# Patient Record
Sex: MALE | Race: WHITE | NOT HISPANIC OR LATINO | URBAN - METROPOLITAN AREA
[De-identification: names, ages, dates, MRNs, and addresses within clinical notes are randomized per-mention and may not be internally consistent; named-entity substitution may affect disease eponyms.]

---

## 2017-01-03 ENCOUNTER — OUTPATIENT (OUTPATIENT)
Dept: OUTPATIENT SERVICES | Facility: HOSPITAL | Age: 42
LOS: 1 days | Discharge: HOME | End: 2017-01-03

## 2017-01-03 DIAGNOSIS — C46.0 KAPOSI'S SARCOMA OF SKIN: ICD-10-CM

## 2017-01-04 ENCOUNTER — RESULT REVIEW (OUTPATIENT)
Age: 42
End: 2017-01-04

## 2017-01-05 ENCOUNTER — LABORATORY RESULT (OUTPATIENT)
Age: 42
End: 2017-01-05

## 2017-01-05 ENCOUNTER — TRANSCRIPTION ENCOUNTER (OUTPATIENT)
Age: 42
End: 2017-01-05

## 2017-01-05 ENCOUNTER — APPOINTMENT (OUTPATIENT)
Dept: INFUSION THERAPY | Facility: HOSPITAL | Age: 42
End: 2017-01-05

## 2017-01-05 ENCOUNTER — APPOINTMENT (OUTPATIENT)
Dept: HEMATOLOGY ONCOLOGY | Facility: CLINIC | Age: 42
End: 2017-01-05

## 2017-01-05 VITALS
OXYGEN SATURATION: 98 % | SYSTOLIC BLOOD PRESSURE: 120 MMHG | WEIGHT: 169.75 LBS | BODY MASS INDEX: 25.81 KG/M2 | DIASTOLIC BLOOD PRESSURE: 81 MMHG | RESPIRATION RATE: 16 BRPM | HEART RATE: 76 BPM | TEMPERATURE: 97.4 F

## 2017-01-05 LAB
BASOPHILS # BLD AUTO: 0 K/UL — SIGNIFICANT CHANGE UP (ref 0–0.2)
BASOPHILS NFR BLD AUTO: 5 % — HIGH (ref 0–2)
EOSINOPHIL # BLD AUTO: 0.1 K/UL — SIGNIFICANT CHANGE UP (ref 0–0.5)
EOSINOPHIL NFR BLD AUTO: 3 % — SIGNIFICANT CHANGE UP (ref 0–6)
HCT VFR BLD CALC: 39.8 % — SIGNIFICANT CHANGE UP (ref 39–50)
HGB BLD-MCNC: 13.8 G/DL — SIGNIFICANT CHANGE UP (ref 13–17)
LYMPHOCYTES # BLD AUTO: 1.6 K/UL — SIGNIFICANT CHANGE UP (ref 1–3.3)
LYMPHOCYTES # BLD AUTO: 57 % — HIGH (ref 13–44)
MCHC RBC-ENTMCNC: 29.5 PG — SIGNIFICANT CHANGE UP (ref 27–34)
MCHC RBC-ENTMCNC: 34.7 GM/DL — SIGNIFICANT CHANGE UP (ref 32–36)
MCV RBC AUTO: 85.2 FL — SIGNIFICANT CHANGE UP (ref 80–100)
MONOCYTES # BLD AUTO: 0.7 K/UL — SIGNIFICANT CHANGE UP (ref 0–0.9)
MONOCYTES NFR BLD AUTO: 27 % — HIGH (ref 2–14)
NEUTROPHILS # BLD AUTO: 0.3 K/UL — LOW (ref 1.8–7.4)
NEUTROPHILS NFR BLD AUTO: 8 % — LOW (ref 43–77)
PLAT MORPH BLD: NORMAL — SIGNIFICANT CHANGE UP
PLATELET # BLD AUTO: 217 K/UL — SIGNIFICANT CHANGE UP (ref 150–400)
RBC # BLD: 4.67 M/UL — SIGNIFICANT CHANGE UP (ref 4.2–5.8)
RBC # FLD: 13.5 % — SIGNIFICANT CHANGE UP (ref 10.3–14.5)
RBC BLD AUTO: NORMAL — SIGNIFICANT CHANGE UP
WBC # BLD: 2.7 K/UL — LOW (ref 3.8–10.5)
WBC # FLD AUTO: 2.7 K/UL — LOW (ref 3.8–10.5)

## 2017-01-06 LAB
ALBUMIN SERPL ELPH-MCNC: 3.8 G/DL
ALP BLD-CCNC: 58 U/L
ALT SERPL-CCNC: 23 U/L
ANION GAP SERPL CALC-SCNC: 8 MMOL/L
APPEARANCE: CLEAR
AST SERPL-CCNC: 28 U/L
BILIRUB SERPL-MCNC: 0.5 MG/DL
BILIRUBIN URINE: NEGATIVE
BLOOD URINE: ABNORMAL
BUN SERPL-MCNC: 13 MG/DL
CALCIUM SERPL-MCNC: 9.2 MG/DL
CHLORIDE SERPL-SCNC: 100 MMOL/L
CO2 SERPL-SCNC: 28 MMOL/L
COLOR: YELLOW
CREAT SERPL-MCNC: 0.95 MG/DL
GLUCOSE QUALITATIVE U: NORMAL MG/DL
GLUCOSE SERPL-MCNC: 90 MG/DL
KETONES URINE: NEGATIVE
LEUKOCYTE ESTERASE URINE: NEGATIVE
NITRITE URINE: NEGATIVE
PH URINE: 6.5
POTASSIUM SERPL-SCNC: 4.9 MMOL/L
PROT SERPL-MCNC: 8.9 G/DL
PROTEIN URINE: NEGATIVE MG/DL
SODIUM SERPL-SCNC: 136 MMOL/L
SPECIFIC GRAVITY URINE: 1.01
UROBILINOGEN URINE: NORMAL MG/DL

## 2017-01-11 LAB — BACTERIA BLD CULT: NORMAL

## 2017-01-17 ENCOUNTER — LABORATORY RESULT (OUTPATIENT)
Age: 42
End: 2017-01-17

## 2017-01-18 ENCOUNTER — RESULT REVIEW (OUTPATIENT)
Age: 42
End: 2017-01-18

## 2017-01-18 ENCOUNTER — OUTPATIENT (OUTPATIENT)
Dept: OUTPATIENT SERVICES | Facility: HOSPITAL | Age: 42
LOS: 1 days | End: 2017-01-18
Payer: MEDICAID

## 2017-01-18 ENCOUNTER — APPOINTMENT (OUTPATIENT)
Dept: INFECTIOUS DISEASE | Facility: CLINIC | Age: 42
End: 2017-01-18

## 2017-01-18 VITALS
TEMPERATURE: 97.1 F | OXYGEN SATURATION: 99 % | WEIGHT: 172 LBS | SYSTOLIC BLOOD PRESSURE: 121 MMHG | HEIGHT: 68 IN | BODY MASS INDEX: 26.07 KG/M2 | DIASTOLIC BLOOD PRESSURE: 80 MMHG | HEART RATE: 78 BPM

## 2017-01-18 DIAGNOSIS — C46.9 KAPOSI'S SARCOMA, UNSPECIFIED: ICD-10-CM

## 2017-01-18 DIAGNOSIS — G47.00 INSOMNIA, UNSPECIFIED: ICD-10-CM

## 2017-01-18 DIAGNOSIS — D70.2 OTHER DRUG-INDUCED AGRANULOCYTOSIS: ICD-10-CM

## 2017-01-18 DIAGNOSIS — B19.10 UNSPECIFIED VIRAL HEPATITIS B WITHOUT HEPATIC COMA: ICD-10-CM

## 2017-01-18 DIAGNOSIS — B20 HUMAN IMMUNODEFICIENCY VIRUS [HIV] DISEASE: ICD-10-CM

## 2017-01-18 DIAGNOSIS — Z20.828 CONTACT WITH AND (SUSPECTED) EXPOSURE TO OTHER VIRAL COMMUNICABLE DISEASES: ICD-10-CM

## 2017-01-18 PROCEDURE — 86709 HEPATITIS A IGM ANTIBODY: CPT

## 2017-01-18 PROCEDURE — 83036 HEMOGLOBIN GLYCOSYLATED A1C: CPT

## 2017-01-18 PROCEDURE — 84156 ASSAY OF PROTEIN URINE: CPT

## 2017-01-18 PROCEDURE — G0463: CPT

## 2017-01-18 PROCEDURE — 87591 N.GONORRHOEAE DNA AMP PROB: CPT

## 2017-01-18 PROCEDURE — 81001 URINALYSIS AUTO W/SCOPE: CPT

## 2017-01-18 PROCEDURE — 86480 TB TEST CELL IMMUN MEASURE: CPT

## 2017-01-18 PROCEDURE — 86706 HEP B SURFACE ANTIBODY: CPT

## 2017-01-18 PROCEDURE — 86708 HEPATITIS A ANTIBODY: CPT

## 2017-01-18 PROCEDURE — 86592 SYPHILIS TEST NON-TREP QUAL: CPT

## 2017-01-18 PROCEDURE — 80053 COMPREHEN METABOLIC PANEL: CPT

## 2017-01-18 PROCEDURE — 36415 COLL VENOUS BLD VENIPUNCTURE: CPT

## 2017-01-18 PROCEDURE — 86780 TREPONEMA PALLIDUM: CPT

## 2017-01-18 PROCEDURE — 85027 COMPLETE CBC AUTOMATED: CPT

## 2017-01-18 PROCEDURE — 87491 CHLMYD TRACH DNA AMP PROBE: CPT

## 2017-01-18 PROCEDURE — 80061 LIPID PANEL: CPT

## 2017-01-18 PROCEDURE — 87536 HIV-1 QUANT&REVRSE TRNSCRPJ: CPT

## 2017-01-18 PROCEDURE — 86803 HEPATITIS C AB TEST: CPT

## 2017-01-18 RX ORDER — CIPROFLOXACIN HYDROCHLORIDE 500 MG/1
500 TABLET, FILM COATED ORAL
Qty: 14 | Refills: 0 | Status: COMPLETED | COMMUNITY
Start: 2017-01-05 | End: 2017-01-18

## 2017-01-18 RX ORDER — DIPHENHYDRAMINE HCL 25 MG/1
25 CAPSULE ORAL
Qty: 30 | Refills: 1 | Status: DISCONTINUED | COMMUNITY
Start: 2017-01-18 | End: 2017-01-18

## 2017-01-19 ENCOUNTER — LABORATORY RESULT (OUTPATIENT)
Age: 42
End: 2017-01-19

## 2017-01-19 ENCOUNTER — OTHER (OUTPATIENT)
Age: 42
End: 2017-01-19

## 2017-01-19 ENCOUNTER — RX RENEWAL (OUTPATIENT)
Age: 42
End: 2017-01-19

## 2017-01-19 ENCOUNTER — APPOINTMENT (OUTPATIENT)
Dept: HEMATOLOGY ONCOLOGY | Facility: CLINIC | Age: 42
End: 2017-01-19

## 2017-01-19 ENCOUNTER — APPOINTMENT (OUTPATIENT)
Dept: INFUSION THERAPY | Facility: HOSPITAL | Age: 42
End: 2017-01-19

## 2017-01-19 VITALS
WEIGHT: 171.98 LBS | SYSTOLIC BLOOD PRESSURE: 120 MMHG | DIASTOLIC BLOOD PRESSURE: 70 MMHG | BODY MASS INDEX: 26.15 KG/M2 | TEMPERATURE: 97.3 F | RESPIRATION RATE: 16 BRPM

## 2017-01-19 DIAGNOSIS — E87.1 HYPO-OSMOLALITY AND HYPONATREMIA: ICD-10-CM

## 2017-01-19 DIAGNOSIS — Z20.828 CONTACT WITH AND (SUSPECTED) EXPOSURE TO OTHER VIRAL COMMUNICABLE DISEASES: ICD-10-CM

## 2017-01-19 DIAGNOSIS — R26.81 UNSTEADINESS ON FEET: ICD-10-CM

## 2017-01-19 LAB
ALBUMIN SERPL ELPH-MCNC: 3.7 G/DL — SIGNIFICANT CHANGE UP (ref 3.3–5)
ALP SERPL-CCNC: 62 U/L — SIGNIFICANT CHANGE UP (ref 40–120)
ALT FLD-CCNC: 25 U/L — SIGNIFICANT CHANGE UP (ref 10–45)
ANION GAP SERPL CALC-SCNC: 13 MMOL/L — SIGNIFICANT CHANGE UP (ref 5–17)
APPEARANCE UR: CLEAR — SIGNIFICANT CHANGE UP
AST SERPL-CCNC: 33 U/L — SIGNIFICANT CHANGE UP (ref 10–40)
BACTERIA # UR AUTO: NEGATIVE — SIGNIFICANT CHANGE UP
BILIRUB SERPL-MCNC: 0.6 MG/DL — SIGNIFICANT CHANGE UP (ref 0.2–1.2)
BILIRUB UR-MCNC: NEGATIVE — SIGNIFICANT CHANGE UP
BUN SERPL-MCNC: 15 MG/DL — SIGNIFICANT CHANGE UP (ref 7–23)
BUN SERPL-MCNC: 16 MG/DL — SIGNIFICANT CHANGE UP (ref 7–23)
C TRACH RRNA SPEC QL NAA+PROBE: SIGNIFICANT CHANGE UP
C TRACH+GC RRNA SPEC QL PROBE: SIGNIFICANT CHANGE UP
CA-I BLDA-SCNC: 1.26 MMOL/L — SIGNIFICANT CHANGE UP (ref 1.12–1.3)
CALCIUM SERPL-MCNC: 9.6 MG/DL — SIGNIFICANT CHANGE UP (ref 8.4–10.5)
CHLAMYDIA/N. GONORRHEA, ORAL/THROAT, TMA - SOURCE ORAL: SIGNIFICANT CHANGE UP
CHLORIDE SERPL-SCNC: 100 MMOL/L — SIGNIFICANT CHANGE UP (ref 96–108)
CHLORIDE SERPL-SCNC: 100 MMOL/L — SIGNIFICANT CHANGE UP (ref 96–108)
CHOLEST SERPL-MCNC: 164 MG/DL — SIGNIFICANT CHANGE UP (ref 10–199)
CO2 SERPL-SCNC: 24 MMOL/L — SIGNIFICANT CHANGE UP (ref 22–31)
CO2 SERPL-SCNC: 26 MMOL/L — SIGNIFICANT CHANGE UP (ref 22–31)
COLOR SPEC: YELLOW — SIGNIFICANT CHANGE UP
CREAT ?TM UR-MCNC: 53 MG/DL — SIGNIFICANT CHANGE UP
CREAT SERPL-MCNC: 1 MG/DL — SIGNIFICANT CHANGE UP (ref 0.5–1.3)
CREAT SERPL-MCNC: 1.02 MG/DL — SIGNIFICANT CHANGE UP (ref 0.5–1.3)
DIFF PNL FLD: NEGATIVE — SIGNIFICANT CHANGE UP
EPI CELLS # UR: 0 /HPF — SIGNIFICANT CHANGE UP (ref 0–5)
GC AMPLIFICATION INTERPRETATION: SIGNIFICANT CHANGE UP
GLUCOSE SERPL-MCNC: 85 MG/DL — SIGNIFICANT CHANGE UP (ref 70–99)
GLUCOSE SERPL-MCNC: 87 MG/DL — SIGNIFICANT CHANGE UP (ref 70–99)
GLUCOSE UR QL: NEGATIVE MG/DL — SIGNIFICANT CHANGE UP
HAV IGG+IGM SER QL: SIGNIFICANT CHANGE UP
HAV IGM SER-ACNC: SIGNIFICANT CHANGE UP
HBV SURFACE AB SER-ACNC: SIGNIFICANT CHANGE UP
HCT VFR BLD CALC: 40.7 % — SIGNIFICANT CHANGE UP (ref 39–50)
HCT VFR BLD CALC: 47.8 % — SIGNIFICANT CHANGE UP (ref 39–50)
HCV AB S/CO SERPL IA: 0.22 S/CO — SIGNIFICANT CHANGE UP
HCV AB SERPL-IMP: SIGNIFICANT CHANGE UP
HDLC SERPL-MCNC: 43 MG/DL — SIGNIFICANT CHANGE UP (ref 40–125)
HGB BLD-MCNC: 13.8 G/DL — SIGNIFICANT CHANGE UP (ref 13–17)
HGB BLD-MCNC: 15.4 G/DL — SIGNIFICANT CHANGE UP (ref 13–17)
KETONES UR-MCNC: NEGATIVE — SIGNIFICANT CHANGE UP
LEUKOCYTE ESTERASE UR-ACNC: NEGATIVE — SIGNIFICANT CHANGE UP
LIPID PNL WITH DIRECT LDL SERPL: 91 MG/DL — SIGNIFICANT CHANGE UP
MCHC RBC-ENTMCNC: 28.4 PG — SIGNIFICANT CHANGE UP (ref 27–34)
MCHC RBC-ENTMCNC: 28.9 PG — SIGNIFICANT CHANGE UP (ref 27–34)
MCHC RBC-ENTMCNC: 32.2 GM/DL — SIGNIFICANT CHANGE UP (ref 32–36)
MCHC RBC-ENTMCNC: 34 GM/DL — SIGNIFICANT CHANGE UP (ref 32–36)
MCV RBC AUTO: 83.6 FL — SIGNIFICANT CHANGE UP (ref 80–100)
MCV RBC AUTO: 89.8 FL — SIGNIFICANT CHANGE UP (ref 80–100)
N GONORRHOEA RRNA SPEC QL NAA+PROBE: SIGNIFICANT CHANGE UP
NITRITE UR-MCNC: NEGATIVE — SIGNIFICANT CHANGE UP
PH UR: 6 — SIGNIFICANT CHANGE UP (ref 5–8)
PLATELET # BLD AUTO: 208 K/UL — SIGNIFICANT CHANGE UP (ref 150–400)
PLATELET # BLD AUTO: 213 K/UL — SIGNIFICANT CHANGE UP (ref 150–400)
POTASSIUM SERPL-MCNC: 4.4 MMOL/L — SIGNIFICANT CHANGE UP (ref 3.5–5.3)
POTASSIUM SERPL-MCNC: 4.6 MMOL/L — SIGNIFICANT CHANGE UP (ref 3.5–5.3)
POTASSIUM SERPL-SCNC: 4.4 MMOL/L — SIGNIFICANT CHANGE UP (ref 3.5–5.3)
POTASSIUM SERPL-SCNC: 4.6 MMOL/L — SIGNIFICANT CHANGE UP (ref 3.5–5.3)
PROT ?TM UR-MCNC: 11 MG/DL — SIGNIFICANT CHANGE UP (ref 0–12)
PROT SERPL-MCNC: 9.3 G/DL — HIGH (ref 6–8.3)
PROT UR-MCNC: NEGATIVE MG/DL — SIGNIFICANT CHANGE UP
PROT/CREAT UR-RTO: 0.2 RATIO — SIGNIFICANT CHANGE UP (ref 0–0.2)
RBC # BLD: 4.87 M/UL — SIGNIFICANT CHANGE UP (ref 4.2–5.8)
RBC # BLD: 5.32 M/UL — SIGNIFICANT CHANGE UP (ref 4.2–5.8)
RBC # FLD: 13 % — SIGNIFICANT CHANGE UP (ref 10.3–14.5)
RBC # FLD: 14.5 % — SIGNIFICANT CHANGE UP (ref 10.3–14.5)
RBC CASTS # UR COMP ASSIST: 1 /HPF — SIGNIFICANT CHANGE UP (ref 0–4)
RPR SERPL-ACNC: SIGNIFICANT CHANGE UP
SODIUM SERPL-SCNC: 137 MMOL/L — SIGNIFICANT CHANGE UP (ref 135–145)
SODIUM SERPL-SCNC: 139 MMOL/L — SIGNIFICANT CHANGE UP (ref 135–145)
SP GR SPEC: 1.01 — SIGNIFICANT CHANGE UP (ref 1.01–1.02)
SPECIMEN SOURCE: SIGNIFICANT CHANGE UP
SPECIMEN SOURCE: SIGNIFICANT CHANGE UP
T PALLIDUM AB TITR SER: POSITIVE
T PALLIDUM IGG SER QL IF: REACTIVE
TOTAL CHOLESTEROL/HDL RATIO MEASUREMENT: 3.8 RATIO — SIGNIFICANT CHANGE UP (ref 3.4–9.6)
TRIGL SERPL-MCNC: 148 MG/DL — SIGNIFICANT CHANGE UP (ref 10–149)
UROBILINOGEN FLD QL: NEGATIVE MG/DL — SIGNIFICANT CHANGE UP
WBC # BLD: 3.79 K/UL — LOW (ref 3.8–10.5)
WBC # BLD: 5.5 K/UL — SIGNIFICANT CHANGE UP (ref 3.8–10.5)
WBC # FLD AUTO: 3.79 K/UL — LOW (ref 3.8–10.5)
WBC # FLD AUTO: 5.5 K/UL — SIGNIFICANT CHANGE UP (ref 3.8–10.5)
WBC UR QL: 0 /HPF — SIGNIFICANT CHANGE UP (ref 0–5)

## 2017-01-20 ENCOUNTER — RESULT REVIEW (OUTPATIENT)
Age: 42
End: 2017-01-20

## 2017-01-20 DIAGNOSIS — Z51.11 ENCOUNTER FOR ANTINEOPLASTIC CHEMOTHERAPY: ICD-10-CM

## 2017-01-20 DIAGNOSIS — R11.2 NAUSEA WITH VOMITING, UNSPECIFIED: ICD-10-CM

## 2017-01-20 LAB
HBA1C BLD-MCNC: 5.1 % — SIGNIFICANT CHANGE UP (ref 4–5.6)
HIV1 RNA # SERPL NAA+PROBE: SIGNIFICANT CHANGE UP
HIV1 RNA SERPL NAA+PROBE-LOG#: <1.6 LG10COP/ML — SIGNIFICANT CHANGE UP
M TB TUBERC IFN-G BLD QL: 0 IU/ML — SIGNIFICANT CHANGE UP
M TB TUBERC IFN-G BLD QL: 0.04 IU/ML — SIGNIFICANT CHANGE UP
M TB TUBERC IFN-G BLD QL: NEGATIVE — SIGNIFICANT CHANGE UP
MITOGEN IGNF BCKGRD COR BLD-ACNC: 6.82 IU/ML — SIGNIFICANT CHANGE UP

## 2017-01-24 ENCOUNTER — RESULT REVIEW (OUTPATIENT)
Age: 42
End: 2017-01-24

## 2017-01-29 ENCOUNTER — TRANSCRIPTION ENCOUNTER (OUTPATIENT)
Age: 42
End: 2017-01-29

## 2017-02-15 ENCOUNTER — OUTPATIENT (OUTPATIENT)
Dept: OUTPATIENT SERVICES | Facility: HOSPITAL | Age: 42
LOS: 1 days | Discharge: HOME | End: 2017-02-15

## 2017-02-15 ENCOUNTER — RESULT REVIEW (OUTPATIENT)
Age: 42
End: 2017-02-15

## 2017-02-15 DIAGNOSIS — C46.0 KAPOSI'S SARCOMA OF SKIN: ICD-10-CM

## 2017-02-16 ENCOUNTER — LABORATORY RESULT (OUTPATIENT)
Age: 42
End: 2017-02-16

## 2017-02-16 ENCOUNTER — OTHER (OUTPATIENT)
Age: 42
End: 2017-02-16

## 2017-02-16 ENCOUNTER — APPOINTMENT (OUTPATIENT)
Dept: HEMATOLOGY ONCOLOGY | Facility: CLINIC | Age: 42
End: 2017-02-16

## 2017-02-16 ENCOUNTER — APPOINTMENT (OUTPATIENT)
Dept: INFUSION THERAPY | Facility: HOSPITAL | Age: 42
End: 2017-02-16

## 2017-02-16 VITALS
WEIGHT: 187.39 LBS | BODY MASS INDEX: 28.49 KG/M2 | RESPIRATION RATE: 16 BRPM | TEMPERATURE: 97.1 F | HEART RATE: 86 BPM | OXYGEN SATURATION: 97 % | SYSTOLIC BLOOD PRESSURE: 118 MMHG | DIASTOLIC BLOOD PRESSURE: 78 MMHG

## 2017-02-16 DIAGNOSIS — G47.00 INSOMNIA, UNSPECIFIED: ICD-10-CM

## 2017-02-16 LAB
BASOPHILS # BLD AUTO: 0 K/UL — SIGNIFICANT CHANGE UP (ref 0–0.2)
BASOPHILS NFR BLD AUTO: 1.2 % — SIGNIFICANT CHANGE UP (ref 0–2)
EOSINOPHIL # BLD AUTO: 0.2 K/UL — SIGNIFICANT CHANGE UP (ref 0–0.5)
EOSINOPHIL NFR BLD AUTO: 4.3 % — SIGNIFICANT CHANGE UP (ref 0–6)
HCT VFR BLD CALC: 42.3 % — SIGNIFICANT CHANGE UP (ref 39–50)
HGB BLD-MCNC: 13.9 G/DL — SIGNIFICANT CHANGE UP (ref 13–17)
LYMPHOCYTES # BLD AUTO: 1.1 K/UL — SIGNIFICANT CHANGE UP (ref 1–3.3)
LYMPHOCYTES # BLD AUTO: 30.9 % — SIGNIFICANT CHANGE UP (ref 13–44)
MCHC RBC-ENTMCNC: 27.7 PG — SIGNIFICANT CHANGE UP (ref 27–34)
MCHC RBC-ENTMCNC: 32.9 G/DL — SIGNIFICANT CHANGE UP (ref 32–36)
MCV RBC AUTO: 84.3 FL — SIGNIFICANT CHANGE UP (ref 80–100)
MONOCYTES # BLD AUTO: 0.7 K/UL — SIGNIFICANT CHANGE UP (ref 0–0.9)
MONOCYTES NFR BLD AUTO: 19.5 % — HIGH (ref 2–14)
NEUTROPHILS # BLD AUTO: 1.6 K/UL — LOW (ref 1.8–7.4)
NEUTROPHILS NFR BLD AUTO: 44.1 % — SIGNIFICANT CHANGE UP (ref 43–77)
PLATELET # BLD AUTO: 239 K/UL — SIGNIFICANT CHANGE UP (ref 150–400)
RBC # BLD: 5.02 M/UL — SIGNIFICANT CHANGE UP (ref 4.2–5.8)
RBC # FLD: 14.3 % — SIGNIFICANT CHANGE UP (ref 10.3–14.5)
WBC # BLD: 3.6 K/UL — LOW (ref 3.8–10.5)
WBC # FLD AUTO: 3.6 K/UL — LOW (ref 3.8–10.5)

## 2017-02-17 DIAGNOSIS — Z51.11 ENCOUNTER FOR ANTINEOPLASTIC CHEMOTHERAPY: ICD-10-CM

## 2017-02-17 DIAGNOSIS — R11.2 NAUSEA WITH VOMITING, UNSPECIFIED: ICD-10-CM

## 2017-02-21 ENCOUNTER — LABORATORY RESULT (OUTPATIENT)
Age: 42
End: 2017-02-21

## 2017-02-21 ENCOUNTER — APPOINTMENT (OUTPATIENT)
Dept: HEMATOLOGY ONCOLOGY | Facility: CLINIC | Age: 42
End: 2017-02-21

## 2017-02-21 ENCOUNTER — RESULT REVIEW (OUTPATIENT)
Age: 42
End: 2017-02-21

## 2017-02-22 ENCOUNTER — OUTPATIENT (OUTPATIENT)
Dept: OUTPATIENT SERVICES | Facility: HOSPITAL | Age: 42
LOS: 1 days | End: 2017-02-22
Payer: MEDICAID

## 2017-02-22 ENCOUNTER — LABORATORY RESULT (OUTPATIENT)
Age: 42
End: 2017-02-22

## 2017-02-22 ENCOUNTER — APPOINTMENT (OUTPATIENT)
Dept: INFECTIOUS DISEASE | Facility: CLINIC | Age: 42
End: 2017-02-22

## 2017-02-22 ENCOUNTER — MEDICATION RENEWAL (OUTPATIENT)
Age: 42
End: 2017-02-22

## 2017-02-22 VITALS
HEIGHT: 68 IN | OXYGEN SATURATION: 97 % | SYSTOLIC BLOOD PRESSURE: 131 MMHG | HEART RATE: 81 BPM | WEIGHT: 188 LBS | TEMPERATURE: 97 F | BODY MASS INDEX: 28.49 KG/M2 | DIASTOLIC BLOOD PRESSURE: 78 MMHG

## 2017-02-22 DIAGNOSIS — B20 HUMAN IMMUNODEFICIENCY VIRUS [HIV] DISEASE: ICD-10-CM

## 2017-02-22 PROCEDURE — 88112 CYTOPATH CELL ENHANCE TECH: CPT | Mod: 26

## 2017-02-22 PROCEDURE — 86360 T CELL ABSOLUTE COUNT/RATIO: CPT

## 2017-02-22 PROCEDURE — 36415 COLL VENOUS BLD VENIPUNCTURE: CPT

## 2017-02-22 PROCEDURE — G0463: CPT | Mod: 25

## 2017-02-22 PROCEDURE — 85027 COMPLETE CBC AUTOMATED: CPT

## 2017-02-22 PROCEDURE — 90834 PSYTX W PT 45 MINUTES: CPT

## 2017-02-22 PROCEDURE — 80053 COMPREHEN METABOLIC PANEL: CPT

## 2017-02-22 PROCEDURE — 87536 HIV-1 QUANT&REVRSE TRNSCRPJ: CPT

## 2017-02-22 PROCEDURE — 88112 CYTOPATH CELL ENHANCE TECH: CPT

## 2017-02-22 RX ORDER — OSELTAMIVIR PHOSPHATE 75 MG/1
75 CAPSULE ORAL
Qty: 10 | Refills: 0 | Status: COMPLETED | COMMUNITY
Start: 2017-01-18 | End: 2017-02-22

## 2017-02-23 LAB
4/8 RATIO: 0.16 RATIO — SIGNIFICANT CHANGE UP (ref 0.9–3.6)
ABS CD8: 976 /UL — HIGH (ref 136–757)
ALBUMIN SERPL ELPH-MCNC: 3.6 G/DL — SIGNIFICANT CHANGE UP (ref 3.3–5)
ALP SERPL-CCNC: 71 U/L — SIGNIFICANT CHANGE UP (ref 40–120)
ALT FLD-CCNC: 47 U/L — HIGH (ref 10–45)
ANION GAP SERPL CALC-SCNC: 15 MMOL/L — SIGNIFICANT CHANGE UP (ref 5–17)
AST SERPL-CCNC: 40 U/L — SIGNIFICANT CHANGE UP (ref 10–40)
BILIRUB SERPL-MCNC: 0.8 MG/DL — SIGNIFICANT CHANGE UP (ref 0.2–1.2)
BUN SERPL-MCNC: 15 MG/DL — SIGNIFICANT CHANGE UP (ref 7–23)
CALCIUM SERPL-MCNC: 9.3 MG/DL — SIGNIFICANT CHANGE UP (ref 8.4–10.5)
CD3 BLASTS SPEC-ACNC: 1121 /UL — SIGNIFICANT CHANGE UP (ref 799–2171)
CD3 BLASTS SPEC-ACNC: 83 % — SIGNIFICANT CHANGE UP (ref 59–85)
CD4 %: 11 % — LOW (ref 36–65)
CD8 %: 72 % — HIGH (ref 11–36)
CHLORIDE SERPL-SCNC: 100 MMOL/L — SIGNIFICANT CHANGE UP (ref 96–108)
CO2 SERPL-SCNC: 23 MMOL/L — SIGNIFICANT CHANGE UP (ref 22–31)
CREAT SERPL-MCNC: 0.92 MG/DL — SIGNIFICANT CHANGE UP (ref 0.5–1.3)
GLUCOSE SERPL-MCNC: 71 MG/DL — SIGNIFICANT CHANGE UP (ref 70–99)
HCT VFR BLD CALC: 44.7 % — SIGNIFICANT CHANGE UP (ref 39–50)
HGB BLD-MCNC: 14.8 G/DL — SIGNIFICANT CHANGE UP (ref 13–17)
MCHC RBC-ENTMCNC: 28.5 PG — SIGNIFICANT CHANGE UP (ref 27–34)
MCHC RBC-ENTMCNC: 33.1 GM/DL — SIGNIFICANT CHANGE UP (ref 32–36)
MCV RBC AUTO: 86.1 FL — SIGNIFICANT CHANGE UP (ref 80–100)
PLATELET # BLD AUTO: 222 K/UL — SIGNIFICANT CHANGE UP (ref 150–400)
POTASSIUM SERPL-MCNC: 4.6 MMOL/L — SIGNIFICANT CHANGE UP (ref 3.5–5.3)
POTASSIUM SERPL-SCNC: 4.6 MMOL/L — SIGNIFICANT CHANGE UP (ref 3.5–5.3)
PROT SERPL-MCNC: 8.3 G/DL — SIGNIFICANT CHANGE UP (ref 6–8.3)
RBC # BLD: 5.19 M/UL — SIGNIFICANT CHANGE UP (ref 4.2–5.8)
RBC # FLD: 16 % — HIGH (ref 10.3–14.5)
SODIUM SERPL-SCNC: 138 MMOL/L — SIGNIFICANT CHANGE UP (ref 135–145)
T-CELL CD4 SUBSET PNL BLD: 151 /UL — LOW (ref 489–1457)
WBC # BLD: 4.43 K/UL — SIGNIFICANT CHANGE UP (ref 3.8–10.5)
WBC # FLD AUTO: 4.43 K/UL — SIGNIFICANT CHANGE UP (ref 3.8–10.5)

## 2017-02-24 LAB
HIV1 RNA # SERPL NAA+PROBE: SIGNIFICANT CHANGE UP
HIV1 RNA SERPL NAA+PROBE-LOG#: <1.6 LG10COP/ML — SIGNIFICANT CHANGE UP

## 2017-02-25 LAB — NON-GYNECOLOGICAL CYTOLOGY STUDY: SIGNIFICANT CHANGE UP

## 2017-02-27 DIAGNOSIS — C46.9 KAPOSI'S SARCOMA, UNSPECIFIED: ICD-10-CM

## 2017-02-27 DIAGNOSIS — L02.413 CUTANEOUS ABSCESS OF RIGHT UPPER LIMB: ICD-10-CM

## 2017-02-27 DIAGNOSIS — J32.9 CHRONIC SINUSITIS, UNSPECIFIED: ICD-10-CM

## 2017-02-27 LAB — HPV DNA HIGH-RISK, ANAL/RECTAL: SIGNIFICANT CHANGE UP

## 2017-03-15 ENCOUNTER — RESULT REVIEW (OUTPATIENT)
Age: 42
End: 2017-03-15

## 2017-03-15 ENCOUNTER — OUTPATIENT (OUTPATIENT)
Dept: OUTPATIENT SERVICES | Facility: HOSPITAL | Age: 42
LOS: 1 days | Discharge: HOME | End: 2017-03-15

## 2017-03-15 DIAGNOSIS — C46.0 KAPOSI'S SARCOMA OF SKIN: ICD-10-CM

## 2017-03-16 ENCOUNTER — LABORATORY RESULT (OUTPATIENT)
Age: 42
End: 2017-03-16

## 2017-03-16 ENCOUNTER — APPOINTMENT (OUTPATIENT)
Dept: INFUSION THERAPY | Facility: HOSPITAL | Age: 42
End: 2017-03-16

## 2017-03-16 ENCOUNTER — APPOINTMENT (OUTPATIENT)
Dept: HEMATOLOGY ONCOLOGY | Facility: CLINIC | Age: 42
End: 2017-03-16

## 2017-03-16 VITALS
SYSTOLIC BLOOD PRESSURE: 134 MMHG | BODY MASS INDEX: 30.34 KG/M2 | HEART RATE: 69 BPM | OXYGEN SATURATION: 98 % | TEMPERATURE: 97.5 F | DIASTOLIC BLOOD PRESSURE: 85 MMHG | WEIGHT: 199.51 LBS | RESPIRATION RATE: 16 BRPM

## 2017-03-16 LAB
BASOPHILS # BLD AUTO: 0 K/UL — SIGNIFICANT CHANGE UP (ref 0–0.2)
BASOPHILS NFR BLD AUTO: 1.4 % — SIGNIFICANT CHANGE UP (ref 0–2)
EOSINOPHIL # BLD AUTO: 0.3 K/UL — SIGNIFICANT CHANGE UP (ref 0–0.5)
EOSINOPHIL NFR BLD AUTO: 8.2 % — HIGH (ref 0–6)
HCT VFR BLD CALC: 42.1 % — SIGNIFICANT CHANGE UP (ref 39–50)
HGB BLD-MCNC: 14.6 G/DL — SIGNIFICANT CHANGE UP (ref 13–17)
LYMPHOCYTES # BLD AUTO: 1.3 K/UL — SIGNIFICANT CHANGE UP (ref 1–3.3)
LYMPHOCYTES # BLD AUTO: 38.2 % — SIGNIFICANT CHANGE UP (ref 13–44)
MCHC RBC-ENTMCNC: 29.1 PG — SIGNIFICANT CHANGE UP (ref 27–34)
MCHC RBC-ENTMCNC: 34.7 G/DL — SIGNIFICANT CHANGE UP (ref 32–36)
MCV RBC AUTO: 83.7 FL — SIGNIFICANT CHANGE UP (ref 80–100)
MONOCYTES # BLD AUTO: 0.6 K/UL — SIGNIFICANT CHANGE UP (ref 0–0.9)
MONOCYTES NFR BLD AUTO: 16.2 % — HIGH (ref 2–14)
NEUTROPHILS # BLD AUTO: 1.2 K/UL — LOW (ref 1.8–7.4)
NEUTROPHILS NFR BLD AUTO: 36 % — LOW (ref 43–77)
PLATELET # BLD AUTO: 197 K/UL — SIGNIFICANT CHANGE UP (ref 150–400)
RBC # BLD: 5.03 M/UL — SIGNIFICANT CHANGE UP (ref 4.2–5.8)
RBC # FLD: 15.4 % — HIGH (ref 10.3–14.5)
WBC # BLD: 3.5 K/UL — LOW (ref 3.8–10.5)
WBC # FLD AUTO: 3.5 K/UL — LOW (ref 3.8–10.5)

## 2017-03-17 DIAGNOSIS — Z51.11 ENCOUNTER FOR ANTINEOPLASTIC CHEMOTHERAPY: ICD-10-CM

## 2017-03-17 DIAGNOSIS — R11.2 NAUSEA WITH VOMITING, UNSPECIFIED: ICD-10-CM

## 2017-03-23 ENCOUNTER — RX RENEWAL (OUTPATIENT)
Age: 42
End: 2017-03-23

## 2017-03-27 ENCOUNTER — TRANSCRIPTION ENCOUNTER (OUTPATIENT)
Age: 42
End: 2017-03-27

## 2017-03-27 ENCOUNTER — RX RENEWAL (OUTPATIENT)
Age: 42
End: 2017-03-27

## 2017-04-17 ENCOUNTER — OUTPATIENT (OUTPATIENT)
Dept: OUTPATIENT SERVICES | Facility: HOSPITAL | Age: 42
LOS: 1 days | Discharge: HOME | End: 2017-04-17

## 2017-04-17 DIAGNOSIS — C46.0 KAPOSI'S SARCOMA OF SKIN: ICD-10-CM

## 2017-04-17 PROBLEM — L02.413 ABSCESS OF ARM, RIGHT: Status: RESOLVED | Noted: 2017-02-22 | Resolved: 2017-04-17

## 2017-04-17 PROBLEM — Z87.09 HISTORY OF SINUSITIS: Status: RESOLVED | Noted: 2017-02-22 | Resolved: 2017-04-17

## 2017-04-19 ENCOUNTER — RESULT REVIEW (OUTPATIENT)
Age: 42
End: 2017-04-19

## 2017-04-20 ENCOUNTER — LABORATORY RESULT (OUTPATIENT)
Age: 42
End: 2017-04-20

## 2017-04-20 ENCOUNTER — APPOINTMENT (OUTPATIENT)
Dept: HEMATOLOGY ONCOLOGY | Facility: CLINIC | Age: 42
End: 2017-04-20

## 2017-04-20 ENCOUNTER — APPOINTMENT (OUTPATIENT)
Dept: INFUSION THERAPY | Facility: HOSPITAL | Age: 42
End: 2017-04-20

## 2017-04-20 VITALS
HEART RATE: 63 BPM | RESPIRATION RATE: 16 BRPM | BODY MASS INDEX: 30.84 KG/M2 | TEMPERATURE: 97.3 F | WEIGHT: 202.82 LBS | OXYGEN SATURATION: 99 % | SYSTOLIC BLOOD PRESSURE: 111 MMHG | DIASTOLIC BLOOD PRESSURE: 71 MMHG

## 2017-04-20 DIAGNOSIS — R06.00 DYSPNEA, UNSPECIFIED: ICD-10-CM

## 2017-04-20 DIAGNOSIS — Z87.09 PERSONAL HISTORY OF OTHER DISEASES OF THE RESPIRATORY SYSTEM: ICD-10-CM

## 2017-04-20 DIAGNOSIS — L02.413 CUTANEOUS ABSCESS OF RIGHT UPPER LIMB: ICD-10-CM

## 2017-04-20 LAB
HCT VFR BLD CALC: 40.7 % — SIGNIFICANT CHANGE UP (ref 39–50)
HGB BLD-MCNC: 13.9 G/DL — SIGNIFICANT CHANGE UP (ref 13–17)
MCHC RBC-ENTMCNC: 30 PG — SIGNIFICANT CHANGE UP (ref 27–34)
MCHC RBC-ENTMCNC: 34.3 G/DL — SIGNIFICANT CHANGE UP (ref 32–36)
MCV RBC AUTO: 87.4 FL — SIGNIFICANT CHANGE UP (ref 80–100)
PLATELET # BLD AUTO: 193 K/UL — SIGNIFICANT CHANGE UP (ref 150–400)
RBC # BLD: 4.65 M/UL — SIGNIFICANT CHANGE UP (ref 4.2–5.8)
RBC # FLD: 15.4 % — HIGH (ref 10.3–14.5)
WBC # BLD: 3.7 K/UL — LOW (ref 3.8–10.5)
WBC # FLD AUTO: 3.7 K/UL — LOW (ref 3.8–10.5)

## 2017-04-21 DIAGNOSIS — Z51.11 ENCOUNTER FOR ANTINEOPLASTIC CHEMOTHERAPY: ICD-10-CM

## 2017-04-21 DIAGNOSIS — R11.2 NAUSEA WITH VOMITING, UNSPECIFIED: ICD-10-CM

## 2017-05-17 ENCOUNTER — OUTPATIENT (OUTPATIENT)
Dept: OUTPATIENT SERVICES | Facility: HOSPITAL | Age: 42
LOS: 1 days | End: 2017-05-17
Payer: MEDICAID

## 2017-05-17 ENCOUNTER — APPOINTMENT (OUTPATIENT)
Dept: CT IMAGING | Facility: CLINIC | Age: 42
End: 2017-05-17

## 2017-05-17 DIAGNOSIS — Z00.8 ENCOUNTER FOR OTHER GENERAL EXAMINATION: ICD-10-CM

## 2017-05-17 PROCEDURE — 71260 CT THORAX DX C+: CPT

## 2017-05-22 ENCOUNTER — MEDICATION RENEWAL (OUTPATIENT)
Age: 42
End: 2017-05-22

## 2017-05-31 ENCOUNTER — OTHER (OUTPATIENT)
Age: 42
End: 2017-05-31

## 2017-06-02 ENCOUNTER — OUTPATIENT (OUTPATIENT)
Dept: OUTPATIENT SERVICES | Facility: HOSPITAL | Age: 42
LOS: 1 days | Discharge: HOME | End: 2017-06-02

## 2017-06-02 DIAGNOSIS — C46.0 KAPOSI'S SARCOMA OF SKIN: ICD-10-CM

## 2017-06-07 ENCOUNTER — RESULT REVIEW (OUTPATIENT)
Age: 42
End: 2017-06-07

## 2017-06-07 ENCOUNTER — APPOINTMENT (OUTPATIENT)
Dept: INFECTIOUS DISEASE | Facility: CLINIC | Age: 42
End: 2017-06-07

## 2017-06-07 ENCOUNTER — APPOINTMENT (OUTPATIENT)
Dept: HEMATOLOGY ONCOLOGY | Facility: CLINIC | Age: 42
End: 2017-06-07

## 2017-06-07 ENCOUNTER — OUTPATIENT (OUTPATIENT)
Dept: OUTPATIENT SERVICES | Facility: HOSPITAL | Age: 42
LOS: 1 days | End: 2017-06-07
Payer: MEDICAID

## 2017-06-07 ENCOUNTER — LABORATORY RESULT (OUTPATIENT)
Age: 42
End: 2017-06-07

## 2017-06-07 VITALS
HEART RATE: 80 BPM | SYSTOLIC BLOOD PRESSURE: 118 MMHG | OXYGEN SATURATION: 99 % | WEIGHT: 191.8 LBS | RESPIRATION RATE: 16 BRPM | DIASTOLIC BLOOD PRESSURE: 70 MMHG | TEMPERATURE: 97.5 F | BODY MASS INDEX: 29.16 KG/M2

## 2017-06-07 VITALS
DIASTOLIC BLOOD PRESSURE: 73 MMHG | WEIGHT: 192 LBS | TEMPERATURE: 97 F | BODY MASS INDEX: 29.1 KG/M2 | OXYGEN SATURATION: 98 % | SYSTOLIC BLOOD PRESSURE: 118 MMHG | HEIGHT: 68 IN | HEART RATE: 83 BPM

## 2017-06-07 DIAGNOSIS — B20 HUMAN IMMUNODEFICIENCY VIRUS [HIV] DISEASE: ICD-10-CM

## 2017-06-07 DIAGNOSIS — D70.2 OTHER DRUG-INDUCED AGRANULOCYTOSIS: ICD-10-CM

## 2017-06-07 LAB
BASOPHILS # BLD AUTO: 0 K/UL — SIGNIFICANT CHANGE UP (ref 0–0.2)
BASOPHILS NFR BLD AUTO: 0.6 % — SIGNIFICANT CHANGE UP (ref 0–2)
EOSINOPHIL # BLD AUTO: 0.2 K/UL — SIGNIFICANT CHANGE UP (ref 0–0.5)
EOSINOPHIL NFR BLD AUTO: 5 % — SIGNIFICANT CHANGE UP (ref 0–6)
HCT VFR BLD CALC: 43.6 % — SIGNIFICANT CHANGE UP (ref 39–50)
HGB BLD-MCNC: 15 G/DL — SIGNIFICANT CHANGE UP (ref 13–17)
LYMPHOCYTES # BLD AUTO: 1 K/UL — SIGNIFICANT CHANGE UP (ref 1–3.3)
LYMPHOCYTES # BLD AUTO: 23.8 % — SIGNIFICANT CHANGE UP (ref 13–44)
MCHC RBC-ENTMCNC: 31.1 PG — SIGNIFICANT CHANGE UP (ref 27–34)
MCHC RBC-ENTMCNC: 34.4 G/DL — SIGNIFICANT CHANGE UP (ref 32–36)
MCV RBC AUTO: 90.3 FL — SIGNIFICANT CHANGE UP (ref 80–100)
MONOCYTES # BLD AUTO: 0.4 K/UL — SIGNIFICANT CHANGE UP (ref 0–0.9)
MONOCYTES NFR BLD AUTO: 10.7 % — SIGNIFICANT CHANGE UP (ref 2–14)
NEUTROPHILS # BLD AUTO: 2.5 K/UL — SIGNIFICANT CHANGE UP (ref 1.8–7.4)
NEUTROPHILS NFR BLD AUTO: 59.9 % — SIGNIFICANT CHANGE UP (ref 43–77)
PLATELET # BLD AUTO: 214 K/UL — SIGNIFICANT CHANGE UP (ref 150–400)
RBC # BLD: 4.83 M/UL — SIGNIFICANT CHANGE UP (ref 4.2–5.8)
RBC # FLD: 13.3 % — SIGNIFICANT CHANGE UP (ref 10.3–14.5)
WBC # BLD: 4.1 K/UL — SIGNIFICANT CHANGE UP (ref 3.8–10.5)
WBC # FLD AUTO: 4.1 K/UL — SIGNIFICANT CHANGE UP (ref 3.8–10.5)

## 2017-06-07 PROCEDURE — 86360 T CELL ABSOLUTE COUNT/RATIO: CPT

## 2017-06-07 PROCEDURE — G0463: CPT

## 2017-06-07 RX ORDER — DIPHENHYDRAMINE HCL 25 MG/1
25 CAPSULE ORAL
Qty: 30 | Refills: 1 | Status: DISCONTINUED | COMMUNITY
Start: 2017-01-18 | End: 2017-06-07

## 2017-06-08 ENCOUNTER — MEDICATION RENEWAL (OUTPATIENT)
Age: 42
End: 2017-06-08

## 2017-06-08 LAB
4/8 RATIO: 0.2 RATIO — LOW (ref 0.9–3.6)
ABS CD8: 669 /UL — SIGNIFICANT CHANGE UP (ref 136–757)
CD3 BLASTS SPEC-ACNC: 807 /UL — SIGNIFICANT CHANGE UP (ref 799–2171)
CD3 BLASTS SPEC-ACNC: 88 % — HIGH (ref 59–85)
CD4 %: 15 % — LOW (ref 36–65)
CD8 %: 73 % — HIGH (ref 11–36)
T-CELL CD4 SUBSET PNL BLD: 133 /UL — LOW (ref 489–1457)

## 2017-06-09 LAB
25(OH)D3 SERPL-MCNC: 21.7 NG/ML
ALBUMIN SERPL ELPH-MCNC: 3.1 G/DL
ALP BLD-CCNC: 73 U/L
ALT SERPL-CCNC: 28 U/L
ANION GAP SERPL CALC-SCNC: 11 MMOL/L
APPEARANCE: CLEAR
AST SERPL-CCNC: 36 U/L
BACTERIA: NEGATIVE
BILIRUB SERPL-MCNC: 0.9 MG/DL
BILIRUBIN URINE: NEGATIVE
BLOOD URINE: NEGATIVE
BUN SERPL-MCNC: 12 MG/DL
CALCIUM SERPL-MCNC: 7.9 MG/DL
CHLORIDE SERPL-SCNC: 103 MMOL/L
CHOLEST SERPL-MCNC: 167 MG/DL
CHOLEST/HDLC SERPL: 3.7 RATIO
CO2 SERPL-SCNC: 22 MMOL/L
COLOR: YELLOW
CREAT SERPL-MCNC: 1.1 MG/DL
CREAT SPEC-SCNC: 124 MG/DL
CREAT/PROT UR: 0.1 RATIO
GLUCOSE QUALITATIVE U: NORMAL MG/DL
GLUCOSE SERPL-MCNC: 79 MG/DL
HBA1C MFR BLD HPLC: 4.9 %
HDLC SERPL-MCNC: 45 MG/DL
HIV1 RNA # SERPL NAA+PROBE: NORMAL COPIES/ML
HYALINE CASTS: 2 /LPF
KETONES URINE: NEGATIVE
LDLC SERPL CALC-MCNC: 100 MG/DL
LEUKOCYTE ESTERASE URINE: NEGATIVE
MICROSCOPIC-UA: NORMAL
NITRITE URINE: NEGATIVE
PH URINE: 6
POTASSIUM SERPL-SCNC: 4.2 MMOL/L
PROT SERPL-MCNC: 6.5 G/DL
PROT UR-MCNC: 9 MG/DL
PROTEIN URINE: NEGATIVE MG/DL
RED BLOOD CELLS URINE: 0 /HPF
SODIUM SERPL-SCNC: 136 MMOL/L
SPECIFIC GRAVITY URINE: 1.02
SQUAMOUS EPITHELIAL CELLS: 1 /HPF
T PALLIDUM AB SER QL IA: POSITIVE
TRIGL SERPL-MCNC: 111 MG/DL
UROBILINOGEN URINE: 1 MG/DL
VIRAL LOAD LOG: ABNORMAL LG10COP/ML
WHITE BLOOD CELLS URINE: 2 /HPF

## 2017-06-13 DIAGNOSIS — R60.0 LOCALIZED EDEMA: ICD-10-CM

## 2017-06-13 DIAGNOSIS — C46.9 KAPOSI'S SARCOMA, UNSPECIFIED: ICD-10-CM

## 2017-07-27 ENCOUNTER — OUTPATIENT (OUTPATIENT)
Dept: OUTPATIENT SERVICES | Facility: HOSPITAL | Age: 42
LOS: 1 days | Discharge: HOME | End: 2017-07-27

## 2017-07-27 DIAGNOSIS — C46.0 KAPOSI'S SARCOMA OF SKIN: ICD-10-CM

## 2017-08-01 ENCOUNTER — APPOINTMENT (OUTPATIENT)
Dept: HEMATOLOGY ONCOLOGY | Facility: CLINIC | Age: 42
End: 2017-08-01
Payer: MEDICAID

## 2017-08-01 ENCOUNTER — OUTPATIENT (OUTPATIENT)
Dept: OUTPATIENT SERVICES | Facility: HOSPITAL | Age: 42
LOS: 1 days | End: 2017-08-01
Payer: MEDICAID

## 2017-08-01 VITALS
RESPIRATION RATE: 16 BRPM | SYSTOLIC BLOOD PRESSURE: 126 MMHG | TEMPERATURE: 97.7 F | HEART RATE: 76 BPM | OXYGEN SATURATION: 100 % | DIASTOLIC BLOOD PRESSURE: 80 MMHG | BODY MASS INDEX: 28.13 KG/M2 | HEIGHT: 67.99 IN | WEIGHT: 185.63 LBS

## 2017-08-01 PROCEDURE — 99213 OFFICE O/P EST LOW 20 MIN: CPT

## 2017-08-01 PROCEDURE — G9001: CPT

## 2017-08-01 RX ORDER — CEPHALEXIN 500 MG/1
500 TABLET ORAL EVERY 6 HOURS
Qty: 28 | Refills: 0 | Status: DISCONTINUED | COMMUNITY
Start: 2017-06-07 | End: 2017-08-01

## 2017-08-01 RX ORDER — PROCHLORPERAZINE MALEATE 5 MG/1
5 TABLET ORAL EVERY 6 HOURS
Qty: 56 | Refills: 3 | Status: DISCONTINUED | COMMUNITY
Start: 2017-01-19 | End: 2017-08-01

## 2017-08-13 ENCOUNTER — INPATIENT (INPATIENT)
Facility: HOSPITAL | Age: 42
LOS: 2 days | Discharge: HOME | DRG: 602 | End: 2017-08-16
Attending: HOSPITALIST | Admitting: HOSPITALIST
Payer: MEDICAID

## 2017-08-13 VITALS
SYSTOLIC BLOOD PRESSURE: 101 MMHG | TEMPERATURE: 98 F | DIASTOLIC BLOOD PRESSURE: 56 MMHG | OXYGEN SATURATION: 98 % | RESPIRATION RATE: 20 BRPM | HEART RATE: 88 BPM

## 2017-08-13 DIAGNOSIS — L03.90 CELLULITIS, UNSPECIFIED: ICD-10-CM

## 2017-08-13 PROCEDURE — 93970 EXTREMITY STUDY: CPT | Mod: 26

## 2017-08-13 PROCEDURE — 71020: CPT | Mod: 26

## 2017-08-13 PROCEDURE — 99285 EMERGENCY DEPT VISIT HI MDM: CPT

## 2017-08-13 NOTE — ED PROVIDER NOTE - MEDICAL DECISION MAKING DETAILS
A 42 year old male pt presents to the ED c/o medical evaluation. Sepsis protocol initiated. Will US lower extremities, r/o DVT and PE.

## 2017-08-13 NOTE — ED PROVIDER NOTE - PMH
Hepatitis B infection without delta agent without hepatic coma, unspecified chronicity    HIV (human immunodeficiency virus infection)    Kaposi's sarcoma of skin    Kaposi's sarcoma of skin

## 2017-08-13 NOTE — ED ADULT TRIAGE NOTE - CHIEF COMPLAINT QUOTE
pt biba, from [facility], states that he believed he was bit by a bug at 1am this morning, patient now has redness and swelling to bilateral legs, patient also states that he is unable to walk secondary to pain. code sepsis called

## 2017-08-13 NOTE — ED PROVIDER NOTE - OBJECTIVE STATEMENT
A 42 year old male pt with a hx of Kaposi's sarcoma, finished chemo two months ago, currently in rehab for walking issues, presents to the ED c/o pain, erythema to bilateral lower extremities. The pt states that since 1 AM he has been experiencing erythema and pain to his right thigh that has been worsening since. Pt states the pain feels like an insect bite but did not get bit by anything that he knows of. He has not taken any medication for his symptoms. No further complaints at this time.

## 2017-08-14 DIAGNOSIS — L03.90 CELLULITIS, UNSPECIFIED: ICD-10-CM

## 2017-08-14 DIAGNOSIS — C46.0 KAPOSI'S SARCOMA OF SKIN: ICD-10-CM

## 2017-08-14 DIAGNOSIS — R52 PAIN, UNSPECIFIED: ICD-10-CM

## 2017-08-14 DIAGNOSIS — R06.02 SHORTNESS OF BREATH: ICD-10-CM

## 2017-08-14 DIAGNOSIS — L03.115 CELLULITIS OF RIGHT LOWER LIMB: ICD-10-CM

## 2017-08-14 DIAGNOSIS — Z29.9 ENCOUNTER FOR PROPHYLACTIC MEASURES, UNSPECIFIED: ICD-10-CM

## 2017-08-14 DIAGNOSIS — B19.10 UNSPECIFIED VIRAL HEPATITIS B WITHOUT HEPATIC COMA: ICD-10-CM

## 2017-08-14 DIAGNOSIS — Z21 ASYMPTOMATIC HUMAN IMMUNODEFICIENCY VIRUS [HIV] INFECTION STATUS: ICD-10-CM

## 2017-08-14 DIAGNOSIS — F41.9 ANXIETY DISORDER, UNSPECIFIED: ICD-10-CM

## 2017-08-14 LAB
ALBUMIN SERPL ELPH-MCNC: 2.2 G/DL — LOW (ref 3.3–5.2)
ALBUMIN SERPL ELPH-MCNC: 2.4 G/DL — LOW (ref 3.3–5.2)
ALP SERPL-CCNC: 46 U/L — SIGNIFICANT CHANGE UP (ref 40–120)
ALP SERPL-CCNC: 49 U/L — SIGNIFICANT CHANGE UP (ref 40–120)
ALT FLD-CCNC: 36 U/L — SIGNIFICANT CHANGE UP
ALT FLD-CCNC: 42 U/L — HIGH
ANION GAP SERPL CALC-SCNC: 13 MMOL/L — SIGNIFICANT CHANGE UP (ref 5–17)
ANION GAP SERPL CALC-SCNC: 8 MMOL/L — SIGNIFICANT CHANGE UP (ref 5–17)
AST SERPL-CCNC: 40 U/L — HIGH
AST SERPL-CCNC: 43 U/L — HIGH
BASOPHILS # BLD AUTO: 0 K/UL — SIGNIFICANT CHANGE UP (ref 0–0.2)
BASOPHILS NFR BLD AUTO: 0.1 % — SIGNIFICANT CHANGE UP (ref 0–2)
BILIRUB SERPL-MCNC: 0.4 MG/DL — SIGNIFICANT CHANGE UP (ref 0.4–2)
BILIRUB SERPL-MCNC: 1.3 MG/DL — SIGNIFICANT CHANGE UP (ref 0.4–2)
BUN SERPL-MCNC: 15 MG/DL — SIGNIFICANT CHANGE UP (ref 8–20)
BUN SERPL-MCNC: 17 MG/DL — SIGNIFICANT CHANGE UP (ref 8–20)
CALCIUM SERPL-MCNC: 7.5 MG/DL — LOW (ref 8.6–10.2)
CALCIUM SERPL-MCNC: 7.6 MG/DL — LOW (ref 8.6–10.2)
CHLORIDE SERPL-SCNC: 101 MMOL/L — SIGNIFICANT CHANGE UP (ref 98–107)
CHLORIDE SERPL-SCNC: 93 MMOL/L — LOW (ref 98–107)
CK SERPL-CCNC: 104 U/L — SIGNIFICANT CHANGE UP (ref 30–200)
CO2 SERPL-SCNC: 21 MMOL/L — LOW (ref 22–29)
CO2 SERPL-SCNC: 26 MMOL/L — SIGNIFICANT CHANGE UP (ref 22–29)
CREAT SERPL-MCNC: 1.03 MG/DL — SIGNIFICANT CHANGE UP (ref 0.5–1.3)
CREAT SERPL-MCNC: 1.12 MG/DL — SIGNIFICANT CHANGE UP (ref 0.5–1.3)
EOSINOPHIL # BLD AUTO: 0 K/UL — SIGNIFICANT CHANGE UP (ref 0–0.5)
EOSINOPHIL NFR BLD AUTO: 0.5 % — SIGNIFICANT CHANGE UP (ref 0–6)
GLUCOSE SERPL-MCNC: 86 MG/DL — SIGNIFICANT CHANGE UP (ref 70–115)
GLUCOSE SERPL-MCNC: 99 MG/DL — SIGNIFICANT CHANGE UP (ref 70–115)
HCT VFR BLD CALC: 39.9 % — LOW (ref 42–52)
HGB BLD-MCNC: 13.9 G/DL — LOW (ref 14–18)
LACTATE BLDV-MCNC: 0.9 MMOL/L — SIGNIFICANT CHANGE UP (ref 0.5–2)
LYMPHOCYTES # BLD AUTO: 1.2 K/UL — SIGNIFICANT CHANGE UP (ref 1–4.8)
LYMPHOCYTES # BLD AUTO: 14.5 % — LOW (ref 20–55)
MCHC RBC-ENTMCNC: 29.1 PG — SIGNIFICANT CHANGE UP (ref 27–31)
MCHC RBC-ENTMCNC: 34.3 G/DL — SIGNIFICANT CHANGE UP (ref 32–36)
MCV RBC AUTO: 83.6 FL — SIGNIFICANT CHANGE UP (ref 80–94)
MONOCYTES # BLD AUTO: 0.6 K/UL — SIGNIFICANT CHANGE UP (ref 0–0.8)
MONOCYTES NFR BLD AUTO: 7.7 % — SIGNIFICANT CHANGE UP (ref 3–10)
NEUTROPHILS # BLD AUTO: 6.1 K/UL — SIGNIFICANT CHANGE UP (ref 1.8–8)
NEUTROPHILS NFR BLD AUTO: 77.1 % — HIGH (ref 37–73)
PLATELET # BLD AUTO: 215 K/UL — SIGNIFICANT CHANGE UP (ref 150–400)
POTASSIUM SERPL-MCNC: 3.9 MMOL/L — SIGNIFICANT CHANGE UP (ref 3.5–5.3)
POTASSIUM SERPL-MCNC: 4.2 MMOL/L — SIGNIFICANT CHANGE UP (ref 3.5–5.3)
POTASSIUM SERPL-SCNC: 3.9 MMOL/L — SIGNIFICANT CHANGE UP (ref 3.5–5.3)
POTASSIUM SERPL-SCNC: 4.2 MMOL/L — SIGNIFICANT CHANGE UP (ref 3.5–5.3)
PROT SERPL-MCNC: 5.7 G/DL — LOW (ref 6.6–8.7)
PROT SERPL-MCNC: 6.1 G/DL — LOW (ref 6.6–8.7)
RBC # BLD: 4.77 M/UL — SIGNIFICANT CHANGE UP (ref 4.6–6.2)
RBC # FLD: 13.3 % — SIGNIFICANT CHANGE UP (ref 11–15.6)
SODIUM SERPL-SCNC: 127 MMOL/L — LOW (ref 135–145)
SODIUM SERPL-SCNC: 135 MMOL/L — SIGNIFICANT CHANGE UP (ref 135–145)
WBC # BLD: 8 K/UL — SIGNIFICANT CHANGE UP (ref 4.8–10.8)
WBC # FLD AUTO: 8 K/UL — SIGNIFICANT CHANGE UP (ref 4.8–10.8)

## 2017-08-14 PROCEDURE — 99223 1ST HOSP IP/OBS HIGH 75: CPT

## 2017-08-14 PROCEDURE — 99233 SBSQ HOSP IP/OBS HIGH 50: CPT

## 2017-08-14 PROCEDURE — 71250 CT THORAX DX C-: CPT | Mod: 26

## 2017-08-14 RX ORDER — CEFAZOLIN SODIUM 1 G
2000 VIAL (EA) INJECTION EVERY 8 HOURS
Qty: 0 | Refills: 0 | Status: DISCONTINUED | OUTPATIENT
Start: 2017-08-14 | End: 2017-08-16

## 2017-08-14 RX ORDER — CEFAZOLIN SODIUM 1 G
VIAL (EA) INJECTION
Qty: 0 | Refills: 0 | Status: DISCONTINUED | OUTPATIENT
Start: 2017-08-14 | End: 2017-08-14

## 2017-08-14 RX ORDER — ELVITEGRAVIR, COBICISTAT, EMTRICITABINE, AND TENOFOVIR ALAFENAMIDE 150; 150; 200; 10 MG/1; MG/1; MG/1; MG/1
1 TABLET ORAL DAILY
Qty: 0 | Refills: 0 | Status: DISCONTINUED | OUTPATIENT
Start: 2017-08-14 | End: 2017-08-16

## 2017-08-14 RX ORDER — VANCOMYCIN HCL 1 G
1250 VIAL (EA) INTRAVENOUS EVERY 12 HOURS
Qty: 0 | Refills: 0 | Status: DISCONTINUED | OUTPATIENT
Start: 2017-08-14 | End: 2017-08-14

## 2017-08-14 RX ORDER — CEFAZOLIN SODIUM 1 G
1000 VIAL (EA) INJECTION EVERY 8 HOURS
Qty: 0 | Refills: 0 | Status: DISCONTINUED | OUTPATIENT
Start: 2017-08-14 | End: 2017-08-14

## 2017-08-14 RX ORDER — CEFAZOLIN SODIUM 1 G
1000 VIAL (EA) INJECTION ONCE
Qty: 0 | Refills: 0 | Status: COMPLETED | OUTPATIENT
Start: 2017-08-14 | End: 2017-08-14

## 2017-08-14 RX ORDER — HYDROMORPHONE HYDROCHLORIDE 2 MG/ML
1 INJECTION INTRAMUSCULAR; INTRAVENOUS; SUBCUTANEOUS EVERY 6 HOURS
Qty: 0 | Refills: 0 | Status: DISCONTINUED | OUTPATIENT
Start: 2017-08-14 | End: 2017-08-16

## 2017-08-14 RX ORDER — IBUPROFEN 200 MG
200 TABLET ORAL
Qty: 0 | Refills: 0 | Status: DISCONTINUED | OUTPATIENT
Start: 2017-08-14 | End: 2017-08-16

## 2017-08-14 RX ORDER — ENOXAPARIN SODIUM 100 MG/ML
40 INJECTION SUBCUTANEOUS DAILY
Qty: 0 | Refills: 0 | Status: DISCONTINUED | OUTPATIENT
Start: 2017-08-14 | End: 2017-08-16

## 2017-08-14 RX ADMIN — Medication 100 MILLIGRAM(S): at 23:05

## 2017-08-14 RX ADMIN — Medication 0.5 MILLIGRAM(S): at 10:00

## 2017-08-14 RX ADMIN — Medication 200 MILLIGRAM(S): at 08:59

## 2017-08-14 RX ADMIN — HYDROMORPHONE HYDROCHLORIDE 1 MILLIGRAM(S): 2 INJECTION INTRAMUSCULAR; INTRAVENOUS; SUBCUTANEOUS at 04:38

## 2017-08-14 RX ADMIN — ELVITEGRAVIR, COBICISTAT, EMTRICITABINE, AND TENOFOVIR ALAFENAMIDE 1 TABLET(S): 150; 150; 200; 10 TABLET ORAL at 09:02

## 2017-08-14 RX ADMIN — HYDROMORPHONE HYDROCHLORIDE 1 MILLIGRAM(S): 2 INJECTION INTRAMUSCULAR; INTRAVENOUS; SUBCUTANEOUS at 19:53

## 2017-08-14 RX ADMIN — Medication 200 MILLIGRAM(S): at 09:59

## 2017-08-14 RX ADMIN — HYDROMORPHONE HYDROCHLORIDE 1 MILLIGRAM(S): 2 INJECTION INTRAMUSCULAR; INTRAVENOUS; SUBCUTANEOUS at 05:00

## 2017-08-14 RX ADMIN — Medication 1 TABLET(S): at 05:14

## 2017-08-14 RX ADMIN — Medication 200 MILLIGRAM(S): at 16:57

## 2017-08-14 RX ADMIN — HYDROMORPHONE HYDROCHLORIDE 1 MILLIGRAM(S): 2 INJECTION INTRAMUSCULAR; INTRAVENOUS; SUBCUTANEOUS at 13:32

## 2017-08-14 RX ADMIN — Medication 100 MILLIGRAM(S): at 10:00

## 2017-08-14 RX ADMIN — ENOXAPARIN SODIUM 40 MILLIGRAM(S): 100 INJECTION SUBCUTANEOUS at 12:12

## 2017-08-14 RX ADMIN — Medication 100 MILLIGRAM(S): at 02:46

## 2017-08-14 RX ADMIN — Medication 200 MILLIGRAM(S): at 18:07

## 2017-08-14 RX ADMIN — Medication 0.5 MILLIGRAM(S): at 16:07

## 2017-08-14 RX ADMIN — Medication 100 MILLIGRAM(S): at 15:58

## 2017-08-14 RX ADMIN — HYDROMORPHONE HYDROCHLORIDE 1 MILLIGRAM(S): 2 INJECTION INTRAMUSCULAR; INTRAVENOUS; SUBCUTANEOUS at 13:18

## 2017-08-14 NOTE — PROGRESS NOTE ADULT - PROBLEM SELECTOR PLAN 1
Bilateral lower extremity, Left > Right.  Slight improvement in errythema and swelling.  Continue Ancef 1000 mg q 8 hrs and Bactrim  160/800 mg 1 tab q 72hrs. US negative for B/L LE DVT. Bilateral lower extremity, Left > Right.  Slight improvement in erythema and swelling.  Continue Ancef 1000 mg q 8 hrs and Bactrim  160/800 mg 1 tab q 72hrs. US negative for B/L LE DVT.

## 2017-08-14 NOTE — CONSULT NOTE ADULT - SUBJECTIVE AND OBJECTIVE BOX
[facility] SAVANNAH PEDRAZA MD FACP   TELMA UGALDE MD  Diplomates American Board of Internal Medicine and Infecctious Diseases  [id number]  [id number] FATOUMATA [id number]      HPI:  43 y/o male with h/o HIV, Kaposi sarcoma on chemotherapy he sasys he completed chemo , c/o B/L thigh pain and fever       reviw of CT chest from may, 2017 shows right middle lobe consolidation/infiltrate. f/u CT scan was recommended. CD4 from June was 133.        PAST MEDICAL & SURGICAL HISTORY:  Kaposi's sarcoma of skin  Kaposi's sarcoma of skin  Hepatitis B infection without delta agent without hepatic coma, unspecified chronicity  HIV (human immunodeficiency virus infection)  No significant past surgical history      ANTIBIOTICS  [id number] mG/cobicistat 150 mG/emtricitabine 200 mG/tenofovir alafenamide 10 mG (GENVOYA) 1 Tablet(s) Oral daily  trimethoprim  160 mG/sulfamethoxazole 800 mG 1 Tablet(s) Oral every 72 hours  ceFAZolin   IVPB 2000 milliGRAM(s) IV Intermittent every 8 hours      Allergies    No Known Allergies    Intolerances    Percocet 10/325 (Other (Moderate))  Vicodin (Other (Moderate))      SOCIAL HISTORY:    FAMILY HISTORY:  No pertinent family history in first degree relatives      Vital Signs Last 24 Hrs  T(C): 36.7 (14 Aug 2017 13:00), Max: 36.9 (13 Aug 2017 21:35)  T(F): 98 (14 Aug 2017 13:00), Max: 98.4 (13 Aug 2017 21:35)  HR: 86 (14 Aug 2017 13:00) (76 - 88)  BP: 108/69 (14 Aug 2017 13:00) (101/56 - 114/63)  BP(mean): --  RR: 18 (14 Aug 2017 13:00) (18 - 20)  SpO2: 98% (14 Aug 2017 13:00) (98% - 100%)  Drug Dosing Weight  Height (cm): 172.7 (14 Aug 2017 09:59)  Weight (kg): 81.647 (14 Aug 2017 09:59)  BMI (kg/m2): 27.4 (14 Aug 2017 09:59)  BSA (m2): 1.95 (14 Aug 2017 09:59)      REVIEW OF SYSTEMS:    CONSTITUTIONAL:  As per HPI.    HEENT:  Eyes:  No diplopia or blurred vision. ENT:  No earache, sore throat or runny nose.    CARDIOVASCULAR:  No pressure, squeezing, strangling, tightness, heaviness or aching about the chest, neck, axilla or epigastrium.    RESPIRATORY:  No cough, shortness of breath, PND or orthopnea.    GASTROINTESTINAL:  No nausea, vomiting or diarrhea.    GENITOURINARY:  No dysuria, frequency or urgency.    MUSCULOSKELETAL:  As per HPI.    SKIN:  AS ABOVE    NEUROLOGIC:  No paresthesias, fasciculations, seizures or weakness.                  PHYSICAL EXAMINATION:    GENERAL: The patient is a well-developed, MALE IN NAD    VITAL SIGNS: T(C): 36.7 (14 Aug 2017 13:00), Max: 36.9 (13 Aug 2017 21:35)  HR: 86 (14 Aug 2017 13:00) (76 - 88)  BP: 108/69 (14 Aug 2017 13:00) (101/56 - 114/63)  RR: 18 (14 Aug 2017 13:00) (18 - 20)  SpO2: 98% (14 Aug 2017 13:00) (98% - 100%)  Wt(kg): --    HEENT: Head is normocephalic and atraumatic.  ANICTERIC  NECK: Supple. No carotid bruits.  No lymphadenopathy or thyromegaly.    LUNGS: COARSE BREATH SOUNDS    HEART: Regular rate and rhythm without murmur.    ABDOMEN: Soft, nontender, and nondistended.  Positive bowel sounds.  No hepatosplenomegaly was noted. NO REBOUND NO GUARDING    EXTREMITIES:  LEFT UPPER THIGH BOTH LEFT AND RIGHT WITH EDEMA AND ERYTHEMA WARMTH AND TENDERNESS LEFT WORSE THAN RIGHT NO CREPITUS    NEUROLOGIC: NON FOCAL             BLOOD CULTURES       URINE CX          LABS:                        13.9   8.0   )-----------( 215      ( 13 Aug 2017 20:30 )             39.9     08-13    127<L>  |  93<L>  |  15.0  ----------------------------<  99  3.9   |  21.0<L>  |  1.12    Ca    7.6<L>      13 Aug 2017 20:30    TPro  6.1<L>  /  Alb  2.4<L>  /  TBili  1.3  /  DBili  x   /  AST  43<H>  /  ALT  42<H>  /  AlkPhos  49  08-13          RADIOLOGY & ADDITIONAL STUDIES:      ASSESSMENT/PLAN  41YO MALE WITH HIV DIAGNOSED IN OCTOBER AND WITH KAPOSI SARCOMA   S/P CHEMO HAS COMPLETED THIS   PT DEVELOPED REDNESS AND SWELLING IN RIGHT AND LEFT THIGH UNCLEAR IF POSSIBLE INSECT BITE  AGREE WITH IV ABX CHECK BLOOD CX WILL FOLLOW UP SUGGEST WARM COMPRESSES  CD4 133 NEEDS PCP PROPHYLAXIS  CT CHEST FINDINGS APPEAR CHRONIC IN NATURE                TELMA SHELBY MD

## 2017-08-14 NOTE — PROGRESS NOTE ADULT - ASSESSMENT
This This 41 yo male with a pmh of HIV, Kaposi Sarcoma, on Chemotherapy Hepatitis B, presented c/o b/l thigh pain and fever onset yesterday morning and was noted to appeared to be SOB.  Prior CT 5-2017 showed Right Middle Lobe consolidation/ infiltrate.  Follow up CT Scan This 43 yo male with a pmh of HIV, Kaposi Sarcoma, on Chemotherapy Hepatitis B, presented c/o b/l thigh pain and fever onset yesterday morning and was noted to appeared to be SOB.  He was diagnosed with Lower extremity cellulitis and SOB. A  Follow up CT Scan  showed No change in RML opacity and tiny nodular opacities b/l.  Continued CT f/u recommended.  USLE showed no DVT B/L..  He was started on Ancef 100 mg q8 hrs. This 41 yo male with a pmh of HIV, Kaposi Sarcoma, on Chemotherapy Hepatitis B, presented c/o b/l thigh pain and fever onset yesterday morning and was noted to appeared to be SOB.  He was diagnosed with Lower extremity cellulitis and SOB. A  Follow up CT Scan  showed No change in RML opacity and tiny nodular opacities b/l.  Continued CT f/u recommended.  USLE showed no DVT B/L..  He was started on Ancef 1000  mg q8 hrs and Bactrim 160/800 1 tab q 72 hrs.  The patient was given Dilaudid for pain and Ibuprophen was added.  Ativan 0.5 mg given for Anxiety.  Cellulitis which is L>R appeared to be slightly improved on follow up with no change in Kaposi Sarcoma b/l.  Patient initially indicated his cellulitis and pain were worse but on follow up agrees there is a slight improvement.   Test results, Consultant recommendations and  the Plan of Care were discussed with the patient, his mother and Dr. Bora Manzo.

## 2017-08-14 NOTE — PROGRESS NOTE ADULT - ATTENDING COMMENTS
pt examined at bedside. 42yr old male with retroviral disease, Rt leg kaposi sarcoma completed antibiotics - presents with b/l leg pain - diagnosed with Cellulitis. No e/o DVT. started on Iv ancef - Appreciate ID input. f/u cultures. ct to monitor. Pain control. May need PT eval depending on clinical course. pt examined at bedside. 42yr old male with retroviral disease with low CD4 count, Rt leg kaposi sarcoma completed antibiotics - presents with b/l leg pain - diagnosed with Cellulitis. No e/o DVT. started on Iv ancef - Appreciate ID input. ct bactrim for PCP prophylaxis. f/u cultures. ct to monitor. Pain control. May need PT eval depending on clinical course.

## 2017-08-14 NOTE — PROGRESS NOTE ADULT - SUBJECTIVE AND OBJECTIVE BOX
INTERVAL HPI/OVERNIGHT EVENTS:  HPI:  43 y/o male with h/o HIV, Kaposi sarcoma on chemotherapy, c/o B/L thigh pain and fever since morning. no h/o injury or insect bight. [place]. on examination, patient was noticed to be using accessory respiratory muscles. reviw of CT chest from may, 2017 shows right middle lobe consolidation/infiltrate. f/u CT scan was recommended. CD4 from June was 133. patient is on no bactrim prophylaxis. (14 Aug 2017 01:04)    The patient was seen and examined.  He is A&O in NAD, conversing fluently, very anxious, threatening to leave AMA, with his mother at the bedside.  The patient reports that he is feeling worse and has increased swelling and pain in his left lower extremity.  He is also c/o pain and requesting a diet.  Ibupropen ordered in addiion to the existing order for Dilaudid. A regular diet was ordered.  Infection Control approved removing the patient from  MRSA Isolation.  The patient was reassured, his concerns were addressed and he was  given Ativan 0.5 mg which was successful in alleviating  his anxiety.  Test results, consultant recommendations and Plan of Care discussed with the patient and his mother.          MEDICATIONS  (STANDING):  enoxaparin Injectable 40 milliGRAM(s) SubCutaneous daily  [id number] mG/cobicistat 150 mG/emtricitabine 200 mG/tenofovir alafenamide 10 mG (GENVOYA) 1 Tablet(s) Oral daily  trimethoprim  160 mG/sulfamethoxazole 800 mG 1 Tablet(s) Oral every 72 hours  ceFAZolin   IVPB 2000 milliGRAM(s) IV Intermittent every 8 hours    MEDICATIONS  (PRN):  HYDROmorphone  Injectable 1 milliGRAM(s) IV Push every 6 hours PRN Severe Pain (7 - 10)  ibuprofen  Tablet 200 milliGRAM(s) Oral four times a day PRN mod pain      Allergies:  No Known Allergies    Intolerances  Percocet 10/325 (Other (Moderate))  Vicodin (Other (Moderate))      Vital Signs Last 24 Hrs  T(C): 36.7 (14 Aug 2017 13:00), Max: 36.9 (13 Aug 2017 21:35)  T(F): 98 (14 Aug 2017 13:00), Max: 98.4 (13 Aug 2017 21:35)  HR: 86 (14 Aug 2017 13:00) (76 - 88)  BP: 108/69 (14 Aug 2017 13:00) (101/56 - 114/63)  BP(mean): --  RR: 18 (14 Aug 2017 13:00) (18 - 20)  SpO2: 98% (14 Aug 2017 13:00) (98% - 100%)    General: WNWD A&O  in NAD, Highly Anxious.  HEENT:  NCAT,  PERRLA, EOMI, Nares Patent, Pharynx Clear, Uvula midline,   Neck: no lymphadenopathy, no JVD,   Lungs: Clear to auscultation, no wheezes, no rhonchi, no rales b/l.  CV: RRR,  S1,S2,  no Murmur  ABD: +BS x 4; soft,  nontender, no distension,  No guarding,   MS: FROM throughout.  Muscle tone and strength equal b/l ,  no deformity  EXT:  No cyanosis, B/L LE edema. LLE cellulitis with errythema and increased warmth.  B/L LE scaling of Karposi' Sarcoma.  Neuro: A&O x 3; CN II- XII grossly intact.  No focal deficits,  No sensory or motor deficits.    LABS:                          13.9   8.0   )-----------( 215      ( 13 Aug 2017 20:30 )             39.9     08-13    127<L>  |  93<L>  |  15.0  ----------------------------<  99  3.9   |  21.0<L>  |  1.12    Ca    7.6<L>      13 Aug 2017 20:30    TPro  6.1<L>  /  Alb  2.4<L>  /  TBili  1.3  /  DBili  x   /  AST  43<H>  /  ALT  42<H>  /  AlkPhos  49  08-13          CULTURES:      RADIOLOGY & ADDITIONAL TESTS: INTERVAL HPI/OVERNIGHT EVENTS:  HPI:  43 y/o male with h/o HIV, Kaposi sarcoma on chemotherapy, c/o B/L thigh pain and fever since morning. no h/o injury or insect bight. [place]. on examination, patient was noticed to be using accessory respiratory muscles. reviw of CT chest from may, 2017 shows right middle lobe consolidation/infiltrate. f/u CT scan was recommended. CD4 from June was 133. patient is on no bactrim prophylaxis. (14 Aug 2017 01:04)    The patient was seen and examined.  He is A&O in NAD, conversing fluently, very anxious, threatening to leave AMA, with his mother at the bedside.  The patient reports that he is feeling worse and has increased swelling and pain in his B/L  lower extremities L>R.  He is also c/o pain and requesting a diet.  Ibupropen ordered in addiion to the existing order for Dilaudid. A regular diet was ordered.  Infection Control approved removing the patient from  MRSA Isolation.  The patient was reassured, his concerns were addressed and he was  given Ativan 0.5 mg which was successful in alleviating  his anxiety.  Test results, consultant recommendations and Plan of Care discussed with the patient and his mother.          MEDICATIONS  (STANDING):  enoxaparin Injectable 40 milliGRAM(s) SubCutaneous daily  [id number] mG/cobicistat 150 mG/emtricitabine 200 mG/tenofovir alafenamide 10 mG (GENVOYA) 1 Tablet(s) Oral daily  trimethoprim  160 mG/sulfamethoxazole 800 mG 1 Tablet(s) Oral every 72 hours  ceFAZolin   IVPB 2000 milliGRAM(s) IV Intermittent every 8 hours    MEDICATIONS  (PRN):  HYDROmorphone  Injectable 1 milliGRAM(s) IV Push every 6 hours PRN Severe Pain (7 - 10)  ibuprofen  Tablet 200 milliGRAM(s) Oral four times a day PRN mod pain      Allergies:  No Known Allergies    Intolerances  Percocet 10/325 (Other (Moderate))  Vicodin (Other (Moderate))      Vital Signs Last 24 Hrs  T(C): 36.7 (14 Aug 2017 13:00), Max: 36.9 (13 Aug 2017 21:35)  T(F): 98 (14 Aug 2017 13:00), Max: 98.4 (13 Aug 2017 21:35)  HR: 86 (14 Aug 2017 13:00) (76 - 88)  BP: 108/69 (14 Aug 2017 13:00) (101/56 - 114/63)  BP(mean): --  RR: 18 (14 Aug 2017 13:00) (18 - 20)  SpO2: 98% (14 Aug 2017 13:00) (98% - 100%)    General: WNWD A&O  in NAD, Highly Anxious.  HEENT:  NCAT,  PERRLA, EOMI, Nares Patent, Pharynx Clear, Uvula midline,   Neck: no lymphadenopathy, no JVD,   Lungs: Clear to auscultation, no wheezes, no rhonchi, no rales b/l.  CV: RRR,  S1,S2,  no Murmur  ABD: +BS x 4; soft,  nontender, no distension,  No guarding,   MS: FROM throughout.  Muscle tone and strength equal b/l ,  no deformity  EXT:  No cyanosis, B/L LE edema. LLE cellulitis with errythema and increased warmth.  B/L LE scaling of Karposi' Sarcoma.  Neuro: A&O x 3; CN II- XII grossly intact.  No focal deficits,  No sensory or motor deficits.    LABS:                          13.9   8.0   )-----------( 215      ( 13 Aug 2017 20:30 )             39.9     08-13    127<L>  |  93<L>  |  15.0  ----------------------------<  99  3.9   |  21.0<L>  |  1.12    Ca    7.6<L>      13 Aug 2017 20:30    TPro  6.1<L>  /  Alb  2.4<L>  /  TBili  1.3  /  DBili  x   /  AST  43<H>  /  ALT  42<H>  /  AlkPhos  49  08-13      RADIOLOGY & ADDITIONAL TESTS:  CT SCAN INTERVAL HPI/OVERNIGHT EVENTS:  HPI:  43 y/o male with h/o HIV, Kaposi sarcoma on chemotherapy, c/o B/L thigh pain and fever since morning. no h/o injury or insect bight. [place]. on examination, patient was noticed to be using accessory respiratory muscles. reviw of CT chest from may, 2017 shows right middle lobe consolidation/infiltrate. f/u CT scan was recommended. CD4 from June was 133. patient is on no bactrim prophylaxis. (14 Aug 2017 01:04)    The patient was seen and examined.  He is A&O in NAD, conversing fluently, very anxious, threatening to leave AMA, with his mother at the bedside.  The patient reports that he is feeling worse and has increased swelling and pain in his B/L  lower extremities L>R.  He is also c/o pain and requesting a diet.  Ibupropen ordered in addiion to the existing order for Dilaudid. A regular diet was ordered.  Infection Control approved removing the patient from  MRSA Isolation.  The patient was reassured, his concerns were addressed and he was  given Ativan 0.5 mg which was successful in alleviating  his anxiety.  Test results, consultant recommendations and Plan of Care discussed with the patient and his mother.          MEDICATIONS  (STANDING):  enoxaparin Injectable 40 milliGRAM(s) SubCutaneous daily  [id number] mG/cobicistat 150 mG/emtricitabine 200 mG/tenofovir alafenamide 10 mG (GENVOYA) 1 Tablet(s) Oral daily  trimethoprim  160 mG/sulfamethoxazole 800 mG 1 Tablet(s) Oral every 72 hours  ceFAZolin   IVPB 2000 milliGRAM(s) IV Intermittent every 8 hours    MEDICATIONS  (PRN):  HYDROmorphone  Injectable 1 milliGRAM(s) IV Push every 6 hours PRN Severe Pain (7 - 10)  ibuprofen  Tablet 200 milliGRAM(s) Oral four times a day PRN mod pain      Allergies:  No Known Allergies    Intolerances  Percocet 10/325 (Other (Moderate))  Vicodin (Other (Moderate))      Vital Signs Last 24 Hrs  T(C): 36.7 (14 Aug 2017 13:00), Max: 36.9 (13 Aug 2017 21:35)  T(F): 98 (14 Aug 2017 13:00), Max: 98.4 (13 Aug 2017 21:35)  HR: 86 (14 Aug 2017 13:00) (76 - 88)  BP: 108/69 (14 Aug 2017 13:00) (101/56 - 114/63)  BP(mean): --  RR: 18 (14 Aug 2017 13:00) (18 - 20)  SpO2: 98% (14 Aug 2017 13:00) (98% - 100%)    General: WNWD A&O  in NAD, Highly Anxious.  HEENT:  NCAT,  PERRLA, EOMI, Nares Patent, Pharynx Clear, Uvula midline,   Neck: no lymphadenopathy, no JVD,   Lungs: Clear to auscultation, no wheezes, no rhonchi, no rales b/l.  CV: RRR,  S1,S2,  no Murmur  ABD: +BS x 4; soft,  nontender, no distension,  No guarding,   MS: FROM throughout.  Muscle tone and strength equal b/l ,  no deformity  EXT:  No cyanosis, B/L LE edema. LLE cellulitis with errythema and increased warmth.  B/L LE scaling of Karposi' Sarcoma.  Neuro: A&O x 3; CN II- XII grossly intact.  No focal deficits,  No sensory or motor deficits.    LABS:                          13.9   8.0   )-----------( 215      ( 13 Aug 2017 20:30 )             39.9     08-13    127<L>  |  93<L>  |  15.0  ----------------------------<  99  3.9   |  21.0<L>  |  1.12    Ca    7.6<L>      13 Aug 2017 20:30    TPro  6.1<L>  /  Alb  2.4<L>  /  TBili  1.3  /  DBili  x   /  AST  43<H>  /  ALT  42<H>  /  AlkPhos  49  08-13      RADIOLOGY & ADDITIONAL TESTS:  CT SCAN CHEST:   No change in RML opacity and and tiny nodular opacities B/L.  Continued f/u recommended.  US LE:  No DVT B/L INTERVAL HPI/OVERNIGHT EVENTS:  HPI:  43 y/o male with h/o HIV, Kaposi sarcoma on chemotherapy, c/o B/L thigh pain and fever since morning. no h/o injury or insect bight. [place]. on examination, patient was noticed to be using accessory respiratory muscles. reviw of CT chest from may, 2017 shows right middle lobe consolidation/infiltrate. f/u CT scan was recommended. CD4 from June was 133. patient is on no bactrim prophylaxis. (14 Aug 2017 01:04)    The patient was seen and examined.  He is A&O in NAD, conversing fluently, very anxious, threatening to leave AMA, with his mother at the bedside.  The patient reports that he is feeling worse and has increased swelling and pain in his B/L  lower extremities L>R.  He is also c/o pain and requesting a diet.  Ibupropen ordered in addiion to the existing order for Dilaudid. A regular diet was ordered.  Infection Control approved removing the patient from  MRSA Isolation.  The patient was reassured, his concerns were addressed and he was  given Ativan 0.5 mg which was successful in alleviating  his anxiety.  Test results, consultant recommendations and Plan of Care discussed with the patient and his mother.          MEDICATIONS  (STANDING):  enoxaparin Injectable 40 milliGRAM(s) SubCutaneous daily  [id number] mG/cobicistat 150 mG/emtricitabine 200 mG/tenofovir alafenamide 10 mG (GENVOYA) 1 Tablet(s) Oral daily  trimethoprim  160 mG/sulfamethoxazole 800 mG 1 Tablet(s) Oral every 72 hours  ceFAZolin   IVPB 2000 milliGRAM(s) IV Intermittent every 8 hours    MEDICATIONS  (PRN):  HYDROmorphone  Injectable 1 milliGRAM(s) IV Push every 6 hours PRN Severe Pain (7 - 10)  ibuprofen  Tablet 200 milliGRAM(s) Oral four times a day PRN mod pain      Allergies:  No Known Allergies    Intolerances  Percocet 10/325 (Other (Moderate))  Vicodin (Other (Moderate))      Vital Signs Last 24 Hrs  T(C): 36.7 (14 Aug 2017 13:00), Max: 36.9 (13 Aug 2017 21:35)  T(F): 98 (14 Aug 2017 13:00), Max: 98.4 (13 Aug 2017 21:35)  HR: 86 (14 Aug 2017 13:00) (76 - 88)  BP: 108/69 (14 Aug 2017 13:00) (101/56 - 114/63)  BP(mean): --  RR: 18 (14 Aug 2017 13:00) (18 - 20)  SpO2: 98% (14 Aug 2017 13:00) (98% - 100%)    General: WNWD A&O  in NAD, Highly Anxious.  HEENT:  NCAT,  PERRLA, EOMI, Nares Patent, Pharynx Clear, Uvula midline,   Neck: no lymphadenopathy, no JVD,   Lungs: Clear to auscultation, no wheezes, no rhonchi, no rales b/l.  CV: RRR,  S1,S2,  no Murmur  ABD: +BS x 4; soft,  nontender, no distension,  No guarding,   MS: FROM throughout.  Muscle tone and strength equal b/l ,  no deformity  EXT:  No cyanosis, B/L LE edema. LLE cellulitis with errythema and increased warmth.  B/L LE scaling of Karposi' Sarcoma.  Neuro: A&O x 3; CN II- XII grossly intact.  No focal deficits,  No sensory or motor deficits.    LABS:                          13.9   8.0   )-----------( 215      ( 13 Aug 2017 20:30 )             39.9     08-13    127<L>  |  93<L>  |  15.0  ----------------------------<  99  3.9   |  21.0<L>  |  1.12    Ca    7.6<L>      13 Aug 2017 20:30    TPro  6.1<L>  /  Alb  2.4<L>  /  TBili  1.3  /  DBili  x   /  AST  43<H>  /  ALT  42<H>  /  AlkPhos  49  08-13      RADIOLOGY & ADDITIONAL TESTS:  CT SCAN CHEST:   No change in RML opacity and tiny nodular opacities B/L.  Continued f/u recommended.  US LE:  No DVT B/L

## 2017-08-14 NOTE — H&P ADULT - HISTORY OF PRESENT ILLNESS
41 y/o male with h/o HIV, Kaposi sarcoma on chemotherapy, c/o B/L thigh pain and fever since morning. no h/o injury or insect bight. [place]. on examination, patient was noticed to be using accessory respiratory muscles. reviw of CT chest from may, 2017 shows right middle lobe consolidation/infiltrate. f/u CT scan was recommended. CD4 from June was 133. patient is on no bactrim prophylaxis.

## 2017-08-14 NOTE — PATIENT PROFILE ADULT. - NS TRANSFER PATIENT BELONGINGS
Clothing/Electronic Device (specify)/Ipad/Cell Phone/PDA (specify) Electronic Device (specify)/Cell Phone/PDA (specify)/Clothing/Other belongings/Ipad, iphone, John bag

## 2017-08-14 NOTE — PATIENT PROFILE ADULT. - ABILITY TO HEAR (WITH HEARING AID OR HEARING APPLIANCE IF NORMALLY USED):
deaf in right ear/Mildly to Moderately Impaired: difficulty hearing in some environments or speaker may need to increase volume or speak distinctly

## 2017-08-15 LAB
4/8 RATIO: 0.09 RATIO — LOW (ref 0.9–3.6)
ABS CD8: 744 /UL — SIGNIFICANT CHANGE UP (ref 136–757)
ANION GAP SERPL CALC-SCNC: 9 MMOL/L — SIGNIFICANT CHANGE UP (ref 5–17)
BUN SERPL-MCNC: 13 MG/DL — SIGNIFICANT CHANGE UP (ref 8–20)
CALCIUM SERPL-MCNC: 7.3 MG/DL — LOW (ref 8.6–10.2)
CD16+CD56+ CELLS NFR BLD: 12 % — SIGNIFICANT CHANGE UP (ref 4–25)
CD16+CD56+ CELLS NFR SPEC: 124 /UL — SIGNIFICANT CHANGE UP (ref 64–494)
CD19 BLASTS SPEC-ACNC: 4 % — LOW (ref 5–22)
CD19 BLASTS SPEC-ACNC: 42 /UL — LOW (ref 68–528)
CD3 BLASTS SPEC-ACNC: 82 % — SIGNIFICANT CHANGE UP (ref 59–85)
CD3 BLASTS SPEC-ACNC: 824 /UL — SIGNIFICANT CHANGE UP (ref 799–2171)
CD4 %: 7 % — LOW (ref 36–65)
CD8 %: 74 % — HIGH (ref 11–36)
CHLORIDE SERPL-SCNC: 99 MMOL/L — SIGNIFICANT CHANGE UP (ref 98–107)
CO2 SERPL-SCNC: 23 MMOL/L — SIGNIFICANT CHANGE UP (ref 22–29)
CREAT SERPL-MCNC: 0.92 MG/DL — SIGNIFICANT CHANGE UP (ref 0.5–1.3)
GLUCOSE SERPL-MCNC: 89 MG/DL — SIGNIFICANT CHANGE UP (ref 70–115)
HCT VFR BLD CALC: 37.2 % — LOW (ref 42–52)
HGB BLD-MCNC: 12.3 G/DL — LOW (ref 14–18)
MCHC RBC-ENTMCNC: 28.5 PG — SIGNIFICANT CHANGE UP (ref 27–31)
MCHC RBC-ENTMCNC: 33.1 G/DL — SIGNIFICANT CHANGE UP (ref 32–36)
MCV RBC AUTO: 86.3 FL — SIGNIFICANT CHANGE UP (ref 80–94)
PLATELET # BLD AUTO: 197 K/UL — SIGNIFICANT CHANGE UP (ref 150–400)
POTASSIUM SERPL-MCNC: 3.9 MMOL/L — SIGNIFICANT CHANGE UP (ref 3.5–5.3)
POTASSIUM SERPL-SCNC: 3.9 MMOL/L — SIGNIFICANT CHANGE UP (ref 3.5–5.3)
RBC # BLD: 4.31 M/UL — LOW (ref 4.6–6.2)
RBC # FLD: 13.7 % — SIGNIFICANT CHANGE UP (ref 11–15.6)
SODIUM SERPL-SCNC: 131 MMOL/L — LOW (ref 135–145)
T-CELL CD4 SUBSET PNL BLD: 70 /UL — LOW (ref 489–1457)
WBC # BLD: 4.9 K/UL — SIGNIFICANT CHANGE UP (ref 4.8–10.8)
WBC # FLD AUTO: 4.9 K/UL — SIGNIFICANT CHANGE UP (ref 4.8–10.8)

## 2017-08-15 PROCEDURE — 99233 SBSQ HOSP IP/OBS HIGH 50: CPT

## 2017-08-15 RX ORDER — CALCIUM GLUCONATE 100 MG/ML
2 VIAL (ML) INTRAVENOUS ONCE
Qty: 2 | Refills: 0 | Status: COMPLETED | OUTPATIENT
Start: 2017-08-15 | End: 2017-08-15

## 2017-08-15 RX ADMIN — ELVITEGRAVIR, COBICISTAT, EMTRICITABINE, AND TENOFOVIR ALAFENAMIDE 1 TABLET(S): 150; 150; 200; 10 TABLET ORAL at 09:47

## 2017-08-15 RX ADMIN — Medication 100 MILLIGRAM(S): at 06:08

## 2017-08-15 RX ADMIN — Medication 100 MILLIGRAM(S): at 21:04

## 2017-08-15 RX ADMIN — ENOXAPARIN SODIUM 40 MILLIGRAM(S): 100 INJECTION SUBCUTANEOUS at 11:22

## 2017-08-15 RX ADMIN — Medication 100 MILLIGRAM(S): at 14:09

## 2017-08-15 RX ADMIN — HYDROMORPHONE HYDROCHLORIDE 1 MILLIGRAM(S): 2 INJECTION INTRAMUSCULAR; INTRAVENOUS; SUBCUTANEOUS at 01:50

## 2017-08-15 RX ADMIN — HYDROMORPHONE HYDROCHLORIDE 1 MILLIGRAM(S): 2 INJECTION INTRAMUSCULAR; INTRAVENOUS; SUBCUTANEOUS at 07:45

## 2017-08-15 RX ADMIN — HYDROMORPHONE HYDROCHLORIDE 1 MILLIGRAM(S): 2 INJECTION INTRAMUSCULAR; INTRAVENOUS; SUBCUTANEOUS at 21:30

## 2017-08-15 RX ADMIN — HYDROMORPHONE HYDROCHLORIDE 1 MILLIGRAM(S): 2 INJECTION INTRAMUSCULAR; INTRAVENOUS; SUBCUTANEOUS at 21:10

## 2017-08-15 RX ADMIN — HYDROMORPHONE HYDROCHLORIDE 1 MILLIGRAM(S): 2 INJECTION INTRAMUSCULAR; INTRAVENOUS; SUBCUTANEOUS at 01:30

## 2017-08-15 RX ADMIN — Medication 200 GRAM(S): at 09:46

## 2017-08-15 RX ADMIN — Medication 200 MILLIGRAM(S): at 17:29

## 2017-08-15 RX ADMIN — HYDROMORPHONE HYDROCHLORIDE 1 MILLIGRAM(S): 2 INJECTION INTRAMUSCULAR; INTRAVENOUS; SUBCUTANEOUS at 14:58

## 2017-08-15 RX ADMIN — Medication 1 TABLET(S): at 11:22

## 2017-08-15 RX ADMIN — HYDROMORPHONE HYDROCHLORIDE 1 MILLIGRAM(S): 2 INJECTION INTRAMUSCULAR; INTRAVENOUS; SUBCUTANEOUS at 08:06

## 2017-08-15 RX ADMIN — HYDROMORPHONE HYDROCHLORIDE 1 MILLIGRAM(S): 2 INJECTION INTRAMUSCULAR; INTRAVENOUS; SUBCUTANEOUS at 15:18

## 2017-08-15 RX ADMIN — Medication 0.5 MILLIGRAM(S): at 17:55

## 2017-08-15 RX ADMIN — Medication 200 MILLIGRAM(S): at 16:29

## 2017-08-15 NOTE — PROGRESS NOTE ADULT - SUBJECTIVE AND OBJECTIVE BOX
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 He's being treated for a cellulitis that's improving on day 2 of cefazolin. His fevers are much improved and he is clinically well-appearing. Of note, his CD4 is 70, and we will f/u with ID regarding EUGENE prophylaxis in addition to bactrim.     Summary:   REVIEW OF SYSTEMS    General:	"I'm comfortable."    Skin/Breast:  	  Ophthalmologic:  	  ENMT:	    Respiratory and Thorax:  	  Cardiovascular:	    Gastrointestinal:	    Genitourinary:	    Musculoskeletal:	    Neurological:	    Psychiatric:	    Hematology/Lymphatics:	    Endocrine:	    Allergic/Immunologic:	  Vital Signs Last 24 Hrs  T(C): 36.2 (15 Aug 2017 07:43), Max: 37.5 (14 Aug 2017 17:54)  T(F): 97.1 (15 Aug 2017 07:43), Max: 99.5 (14 Aug 2017 17:54)  HR: 92 (15 Aug 2017 07:43) (57 - 93)  BP: 118/72 (15 Aug 2017 07:43) (102/62 - 118/72)  BP(mean): --  RR: 18 (15 Aug 2017 07:43) (16 - 18)  SpO2: 96% (15 Aug 2017 07:43) (96% - 98%)  General: WNWD A&O  in NAD, Highly Anxious.  HEENT:  NCAT,  PERRLA, EOMI, Nares Patent, Pharynx Clear, Uvula midline,   Neck: no lymphadenopathy, no JVD,   Lungs: Clear to auscultation, no wheezes, no rhonchi, no rales b/l.  CV: RRR,  S1,S2,  no Murmur  ABD: +BS x 4; soft,  nontender, no distension,  No guarding,   MS: FROM throughout.  Muscle tone and strength equal b/l ,  no deformity  EXT:  No cyanosis, B/L LE edema. LLE cellulitis with errythema and increased warmth.  B/L LE scaling of Karposi' Sarcoma.  Neuro: A&O x 3; CN II- XII grossly intact.  No focal deficits,  No sensory or motor deficits.                          12.3   4.9   )-----------( 197      ( 15 Aug 2017 08:14 )             37.2     08-15    131<L>  |  99  |  13.0  ----------------------------<  89  3.9   |  23.0  |  0.92    Ca    7.3<L>      15 Aug 2017 08:14    TPro  5.7<L>  /  Alb  2.2<L>  /  TBili  0.4  /  DBili  x   /  AST  40<H>  /  ALT  36  /  AlkPhos  46  08-14    LIVER FUNCTIONS - ( 14 Aug 2017 19:53 )  Alb: 2.2 g/dL / Pro: 5.7 g/dL / ALK PHOS: 46 U/L / ALT: 36 U/L / AST: 40 U/L / GGT: x           Radiology:     MEDICATIONS  (STANDING):  enoxaparin Injectable 40 milliGRAM(s) SubCutaneous daily  [id number] mG/cobicistat 150 mG/emtricitabine 200 mG/tenofovir alafenamide 10 mG (GENVOYA) 1 Tablet(s) Oral daily  trimethoprim  160 mG/sulfamethoxazole 800 mG 1 Tablet(s) Oral every 72 hours  ceFAZolin   IVPB 2000 milliGRAM(s) IV Intermittent every 8 hours  calcium carbonate  625 mG + Vitamin D (OsCal 250 + D) 1 Tablet(s) Oral daily    MEDICATIONS  (PRN):  HYDROmorphone  Injectable 1 milliGRAM(s) IV Push every 6 hours PRN Severe Pain (7 - 10)  ibuprofen  Tablet 200 milliGRAM(s) Oral four times a day PRN mod pain  LORazepam   Injectable 0.5 milliGRAM(s) IV Push every 6 hours PRN Anxiety    LABS:                        13.9   8.0   )-----------( 215      ( 13 Aug 2017 20:30 )             39.9     08-13    127<L>  |  93<L>  |  15.0  ----------------------------<  99  3.9   |  21.0<L>  |  1.12    Ca    7.6<L>      13 Aug 2017 20:30    TPro  6.1<L>  /  Alb  2.4<L>  /  TBili  1.3  /  DBili  x   /  AST  43<H>  /  ALT  42<H>  /  AlkPhos  49  08-13      RADIOLOGY & ADDITIONAL TESTS:  CT SCAN CHEST:   No change in RML opacity and tiny nodular opacities B/L.  Continued f/u recommended.  US LE:  No DVT B/L

## 2017-08-15 NOTE — PROGRESS NOTE ADULT - PROBLEM SELECTOR PLAN 1
-improvement in erythema, cont cefazolin, today is day 2/3.  - Left > Right.   -cont Bactrim  160/800 mg 1 tab q 72hrs.  -US negative for B/L LE DVT.

## 2017-08-15 NOTE — PROGRESS NOTE ADULT - ATTENDING COMMENTS
pt examined at bedside. 42yr old male with retroviral disease with low CD4 count, Rt leg kaposi sarcoma completed antibiotics - presents with b/l leg pain - diagnosed with Cellulitis. No e/o DVT. started on Iv ancef - Appreciate ID input. ct bactrim for PCP prophylaxis. f/u cultures. ct to monitor. Pain control. May need PT eval depending on clinical course.

## 2017-08-15 NOTE — PROGRESS NOTE ADULT - ASSESSMENT
is a 43 yo male with a pmh of HIV, Kaposi Sarcoma, on Chemotherapy Hepatitis B, presented c/o b/l thigh pain and fever onset yesterday morning and was noted to appeared to be SOB.  He was diagnosed with Lower extremity cellulitis.

## 2017-08-16 ENCOUNTER — TRANSCRIPTION ENCOUNTER (OUTPATIENT)
Age: 42
End: 2017-08-16

## 2017-08-16 VITALS
SYSTOLIC BLOOD PRESSURE: 106 MMHG | TEMPERATURE: 98 F | RESPIRATION RATE: 18 BRPM | OXYGEN SATURATION: 98 % | HEART RATE: 71 BPM | DIASTOLIC BLOOD PRESSURE: 64 MMHG

## 2017-08-16 PROCEDURE — 71250 CT THORAX DX C-: CPT

## 2017-08-16 PROCEDURE — 87040 BLOOD CULTURE FOR BACTERIA: CPT

## 2017-08-16 PROCEDURE — 80048 BASIC METABOLIC PNL TOTAL CA: CPT

## 2017-08-16 PROCEDURE — 93970 EXTREMITY STUDY: CPT

## 2017-08-16 PROCEDURE — 86357 NK CELLS TOTAL COUNT: CPT

## 2017-08-16 PROCEDURE — 80053 COMPREHEN METABOLIC PANEL: CPT

## 2017-08-16 PROCEDURE — 99232 SBSQ HOSP IP/OBS MODERATE 35: CPT

## 2017-08-16 PROCEDURE — 71046 X-RAY EXAM CHEST 2 VIEWS: CPT

## 2017-08-16 PROCEDURE — 36415 COLL VENOUS BLD VENIPUNCTURE: CPT

## 2017-08-16 PROCEDURE — 99239 HOSP IP/OBS DSCHRG MGMT >30: CPT

## 2017-08-16 PROCEDURE — 85027 COMPLETE CBC AUTOMATED: CPT

## 2017-08-16 PROCEDURE — 82550 ASSAY OF CK (CPK): CPT

## 2017-08-16 PROCEDURE — 86355 B CELLS TOTAL COUNT: CPT

## 2017-08-16 PROCEDURE — 83605 ASSAY OF LACTIC ACID: CPT

## 2017-08-16 PROCEDURE — 99285 EMERGENCY DEPT VISIT HI MDM: CPT | Mod: 25

## 2017-08-16 RX ORDER — SACCHAROMYCES BOULARDII 250 MG
1 POWDER IN PACKET (EA) ORAL
Qty: 14 | Refills: 0 | OUTPATIENT
Start: 2017-08-16 | End: 2017-08-23

## 2017-08-16 RX ORDER — CEPHALEXIN 500 MG
1 CAPSULE ORAL
Qty: 14 | Refills: 0 | OUTPATIENT
Start: 2017-08-16 | End: 2017-08-23

## 2017-08-16 RX ORDER — ELVITEGRAVIR, COBICISTAT, EMTRICITABINE, AND TENOFOVIR ALAFENAMIDE 150; 150; 200; 10 MG/1; MG/1; MG/1; MG/1
1 TABLET ORAL
Qty: 15 | Refills: 0 | OUTPATIENT
Start: 2017-08-16 | End: 2017-08-31

## 2017-08-16 RX ORDER — ELVITEGRAVIR, COBICISTAT, EMTRICITABINE, AND TENOFOVIR ALAFENAMIDE 150; 150; 200; 10 MG/1; MG/1; MG/1; MG/1
1 TABLET ORAL
Qty: 0 | Refills: 0 | COMMUNITY

## 2017-08-16 RX ADMIN — ELVITEGRAVIR, COBICISTAT, EMTRICITABINE, AND TENOFOVIR ALAFENAMIDE 1 TABLET(S): 150; 150; 200; 10 TABLET ORAL at 09:29

## 2017-08-16 RX ADMIN — Medication 100 MILLIGRAM(S): at 07:38

## 2017-08-16 RX ADMIN — ENOXAPARIN SODIUM 40 MILLIGRAM(S): 100 INJECTION SUBCUTANEOUS at 11:00

## 2017-08-16 RX ADMIN — HYDROMORPHONE HYDROCHLORIDE 1 MILLIGRAM(S): 2 INJECTION INTRAMUSCULAR; INTRAVENOUS; SUBCUTANEOUS at 05:00

## 2017-08-16 RX ADMIN — HYDROMORPHONE HYDROCHLORIDE 1 MILLIGRAM(S): 2 INJECTION INTRAMUSCULAR; INTRAVENOUS; SUBCUTANEOUS at 10:57

## 2017-08-16 RX ADMIN — Medication 200 MILLIGRAM(S): at 13:42

## 2017-08-16 RX ADMIN — HYDROMORPHONE HYDROCHLORIDE 1 MILLIGRAM(S): 2 INJECTION INTRAMUSCULAR; INTRAVENOUS; SUBCUTANEOUS at 11:20

## 2017-08-16 RX ADMIN — Medication 1 TABLET(S): at 11:00

## 2017-08-16 RX ADMIN — HYDROMORPHONE HYDROCHLORIDE 1 MILLIGRAM(S): 2 INJECTION INTRAMUSCULAR; INTRAVENOUS; SUBCUTANEOUS at 04:43

## 2017-08-16 NOTE — DISCHARGE NOTE ADULT - HOSPITAL COURSE
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 He was treated for a cellulitis that improved signifcantly after IV cefazolin. His fevers are gone but his CD4 count was low, noted to be in the 70s. His fevers are much improved and he is clinically well-appearing. He can be d/cd home today and is pleased with his care here at [facility]. I've encouraged him to f/u closely with his PMD and prescribed all meds including keflex po and a 15 day refill for his regular Genvoya.

## 2017-08-16 NOTE — PROGRESS NOTE ADULT - ASSESSMENT
is a 41 yo male with a pmh of HIV, Kaposi Sarcoma, on Chemotherapy Hepatitis B, presented c/o b/l thigh pain and fever onset yesterday morning and was noted to appeared to be SOB.  He was diagnosed with Lower extremity cellulitis.

## 2017-08-16 NOTE — DISCHARGE NOTE ADULT - MEDICATION SUMMARY - MEDICATIONS TO TAKE
I will START or STAY ON the medications listed below when I get home from the hospital:    Genvoya oral tablet  -- 1 tab(s) by mouth once a day  -- Indication: For HIV (human immunodeficiency virus infection)    cephalexin 500 mg oral capsule  -- 1 cap(s) by mouth 2 times a day  -- Finish all this medication unless otherwise directed by prescriber.    -- Indication: For Cellulitis    sulfamethoxazole-trimethoprim 800 mg-160 mg oral tablet  -- 1 tab(s) by mouth every 72 hours  -- Indication: For HIV (human immunodeficiency virus infection)    calcium (as carbonate)-vitamin D 250 mg-125 intl units oral tablet  -- 1 tab(s) by mouth once a day  -- Indication: For Supplementation I will START or STAY ON the medications listed below when I get home from the hospital:    Genvoya oral tablet  -- 1 tab(s) by mouth once a day  -- Indication: For HIV (human immunodeficiency virus infection)    cephalexin 500 mg oral capsule  -- 1 cap(s) by mouth 2 times a day  -- Finish all this medication unless otherwise directed by prescriber.    -- Indication: For Cellulitis    sulfamethoxazole-trimethoprim 800 mg-160 mg oral tablet  -- 1 tab(s) by mouth every 72 hours  -- Indication: For HIV (human immunodeficiency virus infection)    Florastor 250 mg oral capsule  -- 1 cap(s) by mouth 2 times a day  -- Indication: For Probiotics    calcium (as carbonate)-vitamin D 250 mg-125 intl units oral tablet  -- 1 tab(s) by mouth once a day  -- Indication: For Supplementation

## 2017-08-16 NOTE — PROGRESS NOTE ADULT - PROBLEM SELECTOR PLAN 1
-improved erythema, done w/ cefazolin, today is day 3/3, d/c on po keflex  - Left > Right.   -cont Bactrim  160/800 mg 1 tab q 72hrs.  -US negative for B/L LE DVT.

## 2017-08-16 NOTE — DISCHARGE NOTE ADULT - PLAN OF CARE
Please finish off antibiotics and please f/u closely with your PMD and ID doctor Please take the antibiotics to completion and please f/u with your Infectious disease specialist regarding your CD4 count

## 2017-08-16 NOTE — PROGRESS NOTE ADULT - SUBJECTIVE AND OBJECTIVE BOX
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 no h/o injury or insect bight. [place] examination, patient was noticed to be using accessory respiratory muscles. reviw of CT chest from may, 2017 shows right middle lobe consolidation/infiltrate. f/u CT scan was recommended. CD4 from June was 133.    PT CLINICALLY IMPROVED ON IV ABX CD4 NOW 70      Allergies:  No Known Allergies  Percocet 10/325 (Other (Moderate))  Vicodin (Other (Moderate))      Medications:  enoxaparin Injectable 40 milliGRAM(s) SubCutaneous daily  [id number] mG/cobicistat 150 mG/emtricitabine 200 mG/tenofovir alafenamide 10 mG (GENVOYA) 1 Tablet(s) Oral daily  HYDROmorphone  Injectable 1 milliGRAM(s) IV Push every 6 hours PRN  ibuprofen  Tablet 200 milliGRAM(s) Oral four times a day PRN  trimethoprim  160 mG/sulfamethoxazole 800 mG 1 Tablet(s) Oral every 72 hours  ceFAZolin   IVPB 2000 milliGRAM(s) IV Intermittent every 8 hours  LORazepam   Injectable 0.5 milliGRAM(s) IV Push every 6 hours PRN  calcium carbonate  625 mG + Vitamin D (OsCal 250 + D) 1 Tablet(s) Oral daily      ANTIBIOTICS: KEFZOL        Review of Systems: - Negative except as mentioned above     Physical Exam:  ICU Vital Signs Last 24 Hrs  T(C): 36.6 (16 Aug 2017 07:21), Max: 36.6 (16 Aug 2017 05:17)  T(F): 97.9 (16 Aug 2017 07:21), Max: 97.9 (16 Aug 2017 07:21)  HR: 70 (16 Aug 2017 07:21) (66 - 84)  BP: 99/56 (16 Aug 2017 07:21) (99/56 - 110/70)  BP(mean): --  ABP: --  ABP(mean): --  RR: 18 (16 Aug 2017 07:21) (18 - 18)  SpO2: 98% (16 Aug 2017 07:21) (97% - 98%)    GEN: NAD, pleasant  HEENT: normocephalic and atraumatic. EOMI. CHARLEEN...  NECK: Supple. No carotid bruits.  No lymphadenopathy or thyromegaly.  LUNGS: Clear to auscultation.  HEART: Regular rate and rhythm without murmur.  ABDOMEN: Soft, nontender, and nondistended.  Positive bowel sounds.  No hepatosplenomegaly was noted.  NO REBOUND NO GUARDING  EXTREMITIES: Without any cyanosis, clubbing, rash, lesions or edema.  NEUROLOGIC: Cranial nerves II through XII are grossly intact.    SKIN: DECREASED EDEMA AND ERYTHEMA OF BOTH THIGHS      Labs:    08-15    131<L>  |  99  |  13.0  ----------------------------<  89  3.9   |  23.0  |  0.92    Ca    7.3<L>      15 Aug 2017 08:14    TPro  5.7<L>  /  Alb  2.2<L>  /  TBili  0.4  /  DBili  x   /  AST  40<H>  /  ALT  36  /  AlkPhos  46  08-14                          12.3   4.9   )-----------( 197      ( 15 Aug 2017 08:14 )             37.2           LIVER FUNCTIONS - ( 14 Aug 2017 19:53 )  Alb: 2.2 g/dL / Pro: 5.7 g/dL / ALK PHOS: 46 U/L / ALT: 36 U/L / AST: 40 U/L / GGT: x               CAPILLARY BLOOD GLUCOSE

## 2017-08-16 NOTE — PROGRESS NOTE ADULT - SUBJECTIVE AND OBJECTIVE BOX
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 He's being treated for a cellulitis that's improving on day 2 of cefazolin. His fevers are much improved and he is clinically well-appearing. He can be d/cd home on po keflex today.   Summary:   REVIEW OF SYSTEMS    General:	"I'm comfortable."    Skin/Breast:  	  Ophthalmologic:  	  ENMT:	    Respiratory and Thorax:  	  Cardiovascular:	    Gastrointestinal:	    Genitourinary:	    Musculoskeletal:	    Neurological:	    Psychiatric:	    Hematology/Lymphatics:	    Endocrine:	    Allergic/Immunologic:	  Vital Signs Last 24 Hrs  106/64, HR=71  General: WNWD A&O  in NAD, Highly Anxious.  HEENT:  NCAT,  PERRLA, EOMI, Nares Patent, Pharynx Clear, Uvula midline,   Neck: no lymphadenopathy, no JVD,   Lungs: Clear to auscultation, no wheezes, no rhonchi, no rales b/l.  CV: RRR,  S1,S2,  no Murmur  ABD: +BS x 4; soft,  nontender, no distension,  No guarding,   MS: FROM throughout.  Muscle tone and strength equal b/l ,  no deformity  EXT:  No cyanosis, B/L LE edema. LLE cellulitis improving erythema and increased warmth.  B/L LE scaling of Karposi' Sarcoma.  Neuro: A&O x 3; CN II- XII grossly intact.  No focal deficits,  No sensory or motor deficits.                          12.3   4.9   )-----------( 197      ( 15 Aug 2017 08:14 )             37.2     08-15    131<L>  |  99  |  13.0  ----------------------------<  89  3.9   |  23.0  |  0.92    Ca    7.3<L>      15 Aug 2017 08:14    TPro  5.7<L>  /  Alb  2.2<L>  /  TBili  0.4  /  DBili  x   /  AST  40<H>  /  ALT  36  /  AlkPhos  46  08-14    LIVER FUNCTIONS - ( 14 Aug 2017 19:53 )  Alb: 2.2 g/dL / Pro: 5.7 g/dL / ALK PHOS: 46 U/L / ALT: 36 U/L / AST: 40 U/L / GGT: x           Radiology:     MEDICATIONS  (STANDING):  enoxaparin Injectable 40 milliGRAM(s) SubCutaneous daily  [id number] mG/cobicistat 150 mG/emtricitabine 200 mG/tenofovir alafenamide 10 mG (GENVOYA) 1 Tablet(s) Oral daily  trimethoprim  160 mG/sulfamethoxazole 800 mG 1 Tablet(s) Oral every 72 hours  ceFAZolin   IVPB 2000 milliGRAM(s) IV Intermittent every 8 hours  calcium carbonate  625 mG + Vitamin D (OsCal 250 + D) 1 Tablet(s) Oral daily    MEDICATIONS  (PRN):  HYDROmorphone  Injectable 1 milliGRAM(s) IV Push every 6 hours PRN Severe Pain (7 - 10)  ibuprofen  Tablet 200 milliGRAM(s) Oral four times a day PRN mod pain  LORazepam   Injectable 0.5 milliGRAM(s) IV Push every 6 hours PRN Anxiety    LABS:                        13.9   8.0   )-----------( 215      ( 13 Aug 2017 20:30 )             39.9     08-13    127<L>  |  93<L>  |  15.0  ----------------------------<  99  3.9   |  21.0<L>  |  1.12    Ca    7.6<L>      13 Aug 2017 20:30    TPro  6.1<L>  /  Alb  2.4<L>  /  TBili  1.3  /  DBili  x   /  AST  43<H>  /  ALT  42<H>  /  AlkPhos  49  08-13      RADIOLOGY & ADDITIONAL TESTS:  CT SCAN CHEST:   No change in RML opacity and tiny nodular opacities B/L.  Continued f/u recommended.  US LE:  No DVT B/L

## 2017-08-16 NOTE — PROGRESS NOTE ADULT - PROBLEM SELECTOR PLAN 4
Continue Bactrim.  CT Chest showed No change in RML opacity and tiny nodular opacities b/l.

## 2017-08-16 NOTE — PROGRESS NOTE ADULT - PROBLEM SELECTOR PLAN 6
Continue Dilaludid  1 mg q 6 hrs and Ibuprophen 200 mg 4 x daily.
d/c on po motrin prn
Continue Dilaludid  1 mg q 6 hrs and Ibuprophen 200 mg 4 x daily.

## 2017-08-16 NOTE — DISCHARGE NOTE ADULT - CARE PLAN
Principal Discharge DX:	Cellulitis of lower extremity, unspecified laterality  Goal:	Please finish off antibiotics and please f/u closely with your PMD and ID doctor  Instructions for follow-up, activity and diet:	Please take the antibiotics to completion and please f/u with your Infectious disease specialist regarding your CD4 count  Secondary Diagnosis:	HIV (human immunodeficiency virus infection)

## 2017-08-16 NOTE — DISCHARGE NOTE ADULT - PROVIDER TOKENS
FREE:[LAST:[INfectious disease specialist],PHONE:[(   )    -],FAX:[(   )    -]],FREE:[LAST:[PMD],PHONE:[(   )    -],FAX:[(   )    -]]

## 2017-08-16 NOTE — DISCHARGE NOTE ADULT - PATIENT PORTAL LINK FT
“You can access the [facility] Patient Portal, offered by [facility], by registering with the following website: [email or web address]”

## 2017-08-16 NOTE — PROGRESS NOTE ADULT - PROBLEM SELECTOR PROBLEM 5
Hepatitis B infection without delta agent without hepatic coma, unspecified chronicity

## 2017-08-16 NOTE — DISCHARGE NOTE ADULT - CARE PROVIDER_API CALL
INfectious disease specialist,   Phone: (   )    -  Fax: (   )    -    PMD,   Phone: (   )    -  Fax: (   )    -

## 2017-08-16 NOTE — PROGRESS NOTE ADULT - PROBLEM SELECTOR PLAN 2
B/L Lower extremity R>L.  S/p Chemotherapy.  Outpatient follow up.

## 2017-08-17 DIAGNOSIS — R69 ILLNESS, UNSPECIFIED: ICD-10-CM

## 2017-08-19 LAB
CULTURE RESULTS: SIGNIFICANT CHANGE UP
CULTURE RESULTS: SIGNIFICANT CHANGE UP
SPECIMEN SOURCE: SIGNIFICANT CHANGE UP
SPECIMEN SOURCE: SIGNIFICANT CHANGE UP

## 2017-08-25 ENCOUNTER — TRANSCRIPTION ENCOUNTER (OUTPATIENT)
Age: 42
End: 2017-08-25

## 2017-08-25 ENCOUNTER — RX RENEWAL (OUTPATIENT)
Age: 42
End: 2017-08-25

## 2017-09-08 ENCOUNTER — OUTPATIENT (OUTPATIENT)
Dept: OUTPATIENT SERVICES | Facility: HOSPITAL | Age: 42
LOS: 1 days | End: 2017-09-08
Payer: MEDICAID

## 2017-09-08 ENCOUNTER — LABORATORY RESULT (OUTPATIENT)
Age: 42
End: 2017-09-08

## 2017-09-08 ENCOUNTER — APPOINTMENT (OUTPATIENT)
Dept: INFECTIOUS DISEASE | Facility: CLINIC | Age: 42
End: 2017-09-08
Payer: MEDICAID

## 2017-09-08 VITALS
DIASTOLIC BLOOD PRESSURE: 75 MMHG | HEART RATE: 86 BPM | OXYGEN SATURATION: 100 % | SYSTOLIC BLOOD PRESSURE: 126 MMHG | WEIGHT: 184 LBS | BODY MASS INDEX: 27.89 KG/M2 | TEMPERATURE: 97.2 F | HEIGHT: 68 IN

## 2017-09-08 DIAGNOSIS — B20 HUMAN IMMUNODEFICIENCY VIRUS [HIV] DISEASE: ICD-10-CM

## 2017-09-08 PROCEDURE — G0463: CPT | Mod: 25

## 2017-09-08 PROCEDURE — 99214 OFFICE O/P EST MOD 30 MIN: CPT

## 2017-09-08 PROCEDURE — 90834 PSYTX W PT 45 MINUTES: CPT

## 2017-09-13 LAB
ALBUMIN SERPL ELPH-MCNC: 1.9 G/DL
ALP BLD-CCNC: 259 U/L
ALT SERPL-CCNC: 1240 U/L
ANION GAP SERPL CALC-SCNC: 7 MMOL/L
AST SERPL-CCNC: 922 U/L
BASOPHILS # BLD AUTO: 0.06 K/UL
BASOPHILS NFR BLD AUTO: 1.8 %
BILIRUB SERPL-MCNC: 3.5 MG/DL
BUN SERPL-MCNC: 8 MG/DL
CALCIUM SERPL-MCNC: 7.7 MG/DL
CD3 CELLS # BLD: 541 /UL
CD3 CELLS NFR BLD: 85 %
CD3+CD4+ CELLS # BLD: 106 /UL
CD3+CD4+ CELLS NFR BLD: 17 %
CD3+CD4+ CELLS/CD3+CD8+ CLL SPEC: 0.25 RATIO
CD3+CD8+ CELLS # SPEC: 431 /UL
CD3+CD8+ CELLS NFR BLD: 67 %
CHLORIDE SERPL-SCNC: 103 MMOL/L
CO2 SERPL-SCNC: 24 MMOL/L
CREAT SERPL-MCNC: 0.88 MG/DL
EOSINOPHIL # BLD AUTO: 0.3 K/UL
EOSINOPHIL NFR BLD AUTO: 9.9 %
GLUCOSE SERPL-MCNC: 77 MG/DL
HCT VFR BLD CALC: 39.1 %
HGB BLD-MCNC: 12.9 G/DL
HIV1 RNA # SERPL NAA+PROBE: <30 COPIES/ML
HIV1 RNA # SERPL NAA+PROBE: ABNORMAL
LYMPHOCYTES # BLD AUTO: 0.47 K/UL
LYMPHOCYTES NFR BLD AUTO: 15.3 %
MAN DIFF?: NORMAL
MCHC RBC-ENTMCNC: 28.4 PG
MCHC RBC-ENTMCNC: 33 GM/DL
MCV RBC AUTO: 85.9 FL
MONOCYTES # BLD AUTO: 0.53 K/UL
MONOCYTES NFR BLD AUTO: 17.1 %
NEUTROPHILS # BLD AUTO: 1.64 K/UL
NEUTROPHILS NFR BLD AUTO: 53.2 %
PLATELET # BLD AUTO: 206 K/UL
POTASSIUM SERPL-SCNC: 4.5 MMOL/L
PROT SERPL-MCNC: 6.5 G/DL
RBC # BLD: 4.55 M/UL
RBC # FLD: 16.2 %
SODIUM SERPL-SCNC: 134 MMOL/L
VIRAL LOAD INTERP: NORMAL
VIRAL LOAD LOG: <1.47 LG COP/ML
WBC # FLD AUTO: 3.08 K/UL

## 2017-09-21 DIAGNOSIS — R60.0 LOCALIZED EDEMA: ICD-10-CM

## 2017-09-23 ENCOUNTER — LABORATORY RESULT (OUTPATIENT)
Age: 42
End: 2017-09-23

## 2017-09-25 LAB
HAV IGM SER QL: REACTIVE
HBV CORE IGM SER QL: NONREACTIVE
HBV DNA # SERPL NAA+PROBE: NOT DETECTED
HBV SURFACE AG SER QL: NONREACTIVE
HCV AB SER QL: NONREACTIVE
HCV S/CO RATIO: 0.26 S/CO
HEPB DNA PCR LOG: NOT DETECTED LOGIU/ML

## 2017-09-27 LAB
HCV RNA SERPL NAA DL=5-ACNC: NOT DETECTED
HCV RNA SERPL NAA+PROBE-LOG IU: NOT DETECTED LOGIU/ML

## 2017-10-04 ENCOUNTER — OUTPATIENT (OUTPATIENT)
Dept: OUTPATIENT SERVICES | Facility: HOSPITAL | Age: 42
LOS: 1 days | Discharge: HOME | End: 2017-10-04

## 2017-10-04 DIAGNOSIS — C46.0 KAPOSI'S SARCOMA OF SKIN: ICD-10-CM

## 2017-10-10 ENCOUNTER — APPOINTMENT (OUTPATIENT)
Dept: CT IMAGING | Facility: IMAGING CENTER | Age: 42
End: 2017-10-10

## 2017-10-10 ENCOUNTER — OUTPATIENT (OUTPATIENT)
Dept: OUTPATIENT SERVICES | Facility: HOSPITAL | Age: 42
LOS: 1 days | End: 2017-10-10
Payer: MEDICAID

## 2017-10-10 ENCOUNTER — APPOINTMENT (OUTPATIENT)
Dept: HEMATOLOGY ONCOLOGY | Facility: CLINIC | Age: 42
End: 2017-10-10
Payer: MEDICAID

## 2017-10-10 ENCOUNTER — RESULT REVIEW (OUTPATIENT)
Age: 42
End: 2017-10-10

## 2017-10-10 VITALS
SYSTOLIC BLOOD PRESSURE: 133 MMHG | TEMPERATURE: 97.6 F | HEART RATE: 66 BPM | OXYGEN SATURATION: 100 % | DIASTOLIC BLOOD PRESSURE: 85 MMHG | WEIGHT: 189.59 LBS | RESPIRATION RATE: 16 BRPM | BODY MASS INDEX: 28.83 KG/M2

## 2017-10-10 DIAGNOSIS — C46.9 KAPOSI'S SARCOMA, UNSPECIFIED: ICD-10-CM

## 2017-10-10 LAB
ALBUMIN SERPL ELPH-MCNC: 2.8 G/DL
ALP BLD-CCNC: 73 U/L
ALT SERPL-CCNC: 25 U/L
ANION GAP SERPL CALC-SCNC: 8 MMOL/L
AST SERPL-CCNC: 31 U/L
BASOPHILS # BLD AUTO: 0 K/UL — SIGNIFICANT CHANGE UP (ref 0–0.2)
BASOPHILS NFR BLD AUTO: 1 % — SIGNIFICANT CHANGE UP (ref 0–2)
BILIRUB SERPL-MCNC: 0.5 MG/DL
BUN SERPL-MCNC: 13 MG/DL
CALCIUM SERPL-MCNC: 8.1 MG/DL
CHLORIDE SERPL-SCNC: 107 MMOL/L
CO2 SERPL-SCNC: 28 MMOL/L
CREAT SERPL-MCNC: 0.96 MG/DL
EOSINOPHIL # BLD AUTO: 0.9 K/UL — HIGH (ref 0–0.5)
EOSINOPHIL NFR BLD AUTO: 20 % — HIGH (ref 0–6)
GLUCOSE SERPL-MCNC: 96 MG/DL
HCT VFR BLD CALC: 38.6 % — LOW (ref 39–50)
HGB BLD-MCNC: 13.1 G/DL — SIGNIFICANT CHANGE UP (ref 13–17)
LDH SERPL-CCNC: 191 U/L
LYMPHOCYTES # BLD AUTO: 1.2 K/UL — SIGNIFICANT CHANGE UP (ref 1–3.3)
LYMPHOCYTES # BLD AUTO: 26.3 % — SIGNIFICANT CHANGE UP (ref 13–44)
MCHC RBC-ENTMCNC: 30.3 PG — SIGNIFICANT CHANGE UP (ref 27–34)
MCHC RBC-ENTMCNC: 34 G/DL — SIGNIFICANT CHANGE UP (ref 32–36)
MCV RBC AUTO: 89.3 FL — SIGNIFICANT CHANGE UP (ref 80–100)
MONOCYTES # BLD AUTO: 0.5 K/UL — SIGNIFICANT CHANGE UP (ref 0–0.9)
MONOCYTES NFR BLD AUTO: 11.5 % — SIGNIFICANT CHANGE UP (ref 2–14)
NEUTROPHILS # BLD AUTO: 1.8 K/UL — SIGNIFICANT CHANGE UP (ref 1.8–7.4)
NEUTROPHILS NFR BLD AUTO: 41.2 % — LOW (ref 43–77)
PLATELET # BLD AUTO: 202 K/UL — SIGNIFICANT CHANGE UP (ref 150–400)
POTASSIUM SERPL-SCNC: 4.9 MMOL/L
PROT SERPL-MCNC: 6.9 G/DL
RBC # BLD: 4.33 M/UL — SIGNIFICANT CHANGE UP (ref 4.2–5.8)
RBC # FLD: 15.9 % — HIGH (ref 10.3–14.5)
SODIUM SERPL-SCNC: 143 MMOL/L
WBC # BLD: 4.5 K/UL — SIGNIFICANT CHANGE UP (ref 3.8–10.5)
WBC # FLD AUTO: 4.5 K/UL — SIGNIFICANT CHANGE UP (ref 3.8–10.5)

## 2017-10-10 PROCEDURE — 71250 CT THORAX DX C-: CPT | Mod: 26

## 2017-10-10 PROCEDURE — 99214 OFFICE O/P EST MOD 30 MIN: CPT

## 2017-10-10 PROCEDURE — 71250 CT THORAX DX C-: CPT

## 2017-10-10 RX ORDER — CEPHALEXIN 500 MG/1
500 CAPSULE ORAL
Qty: 14 | Refills: 0 | Status: DISCONTINUED | COMMUNITY
Start: 2017-08-16

## 2017-10-10 RX ORDER — PAZOPANIB HYDROCHLORIDE 200 MG/1
200 TABLET, FILM COATED ORAL
Qty: 30 | Refills: 6 | Status: DISCONTINUED | COMMUNITY
Start: 2017-06-09 | End: 2017-10-10

## 2017-10-28 ENCOUNTER — RX RENEWAL (OUTPATIENT)
Age: 42
End: 2017-10-28

## 2017-10-29 ENCOUNTER — MEDICATION RENEWAL (OUTPATIENT)
Age: 42
End: 2017-10-29

## 2017-10-31 ENCOUNTER — OTHER (OUTPATIENT)
Age: 42
End: 2017-10-31

## 2017-10-31 ENCOUNTER — APPOINTMENT (OUTPATIENT)
Dept: HEMATOLOGY ONCOLOGY | Facility: CLINIC | Age: 42
End: 2017-10-31
Payer: MEDICAID

## 2017-10-31 VITALS
DIASTOLIC BLOOD PRESSURE: 80 MMHG | SYSTOLIC BLOOD PRESSURE: 121 MMHG | RESPIRATION RATE: 16 BRPM | WEIGHT: 184.08 LBS | HEART RATE: 68 BPM | BODY MASS INDEX: 27.99 KG/M2 | TEMPERATURE: 97.7 F | OXYGEN SATURATION: 98 %

## 2017-10-31 PROCEDURE — 99215 OFFICE O/P EST HI 40 MIN: CPT

## 2017-10-31 RX ORDER — ZOLPIDEM TARTRATE 10 MG/1
10 TABLET ORAL
Qty: 30 | Refills: 0 | Status: COMPLETED | COMMUNITY
Start: 2017-06-08 | End: 2017-10-31

## 2017-10-31 RX ORDER — TRAMADOL HYDROCHLORIDE 50 MG/1
50 TABLET, COATED ORAL
Qty: 60 | Refills: 0 | Status: DISCONTINUED | COMMUNITY
Start: 2017-10-28 | End: 2017-10-31

## 2017-10-31 RX ORDER — CEPHALEXIN 500 MG/1
500 TABLET ORAL EVERY 6 HOURS
Qty: 28 | Refills: 0 | Status: COMPLETED | COMMUNITY
Start: 2017-10-10 | End: 2017-10-31

## 2017-10-31 RX ORDER — MORPHINE SULFATE 15 MG/1
15 TABLET ORAL
Qty: 56 | Refills: 0 | Status: COMPLETED | COMMUNITY
Start: 2017-10-27 | End: 2017-10-31

## 2017-10-31 RX ORDER — TRAMADOL HYDROCHLORIDE 50 MG/1
50 TABLET, COATED ORAL 3 TIMES DAILY
Qty: 90 | Refills: 0 | Status: COMPLETED | COMMUNITY
Start: 2017-10-31

## 2017-10-31 RX ORDER — TRAMADOL HYDROCHLORIDE 50 MG/1
50 TABLET, COATED ORAL
Qty: 30 | Refills: 0 | Status: COMPLETED | COMMUNITY
Start: 2017-10-10 | End: 2017-10-31

## 2017-10-31 RX ORDER — ALBUTEROL SULFATE 90 UG/1
108 (90 BASE) AEROSOL, METERED RESPIRATORY (INHALATION)
Qty: 1 | Refills: 1 | Status: COMPLETED | COMMUNITY
Start: 2017-01-18 | End: 2017-10-31

## 2017-11-06 ENCOUNTER — APPOINTMENT (OUTPATIENT)
Dept: DERMATOLOGY | Facility: CLINIC | Age: 42
End: 2017-11-06
Payer: MEDICAID

## 2017-11-06 ENCOUNTER — LABORATORY RESULT (OUTPATIENT)
Age: 42
End: 2017-11-06

## 2017-11-06 VITALS
BODY MASS INDEX: 27.74 KG/M2 | HEIGHT: 68 IN | DIASTOLIC BLOOD PRESSURE: 80 MMHG | WEIGHT: 183 LBS | SYSTOLIC BLOOD PRESSURE: 120 MMHG

## 2017-11-06 DIAGNOSIS — Z86.19 PERSONAL HISTORY OF OTHER INFECTIOUS AND PARASITIC DISEASES: ICD-10-CM

## 2017-11-06 DIAGNOSIS — M25.9 JOINT DISORDER, UNSPECIFIED: ICD-10-CM

## 2017-11-06 DIAGNOSIS — Z87.898 PERSONAL HISTORY OF OTHER SPECIFIED CONDITIONS: ICD-10-CM

## 2017-11-06 DIAGNOSIS — Z20.6 CONTACT WITH AND (SUSPECTED) EXPOSURE TO HUMAN IMMUNODEFICIENCY VIRUS [HIV]: ICD-10-CM

## 2017-11-06 DIAGNOSIS — F41.8 OTHER SPECIFIED ANXIETY DISORDERS: ICD-10-CM

## 2017-11-06 PROCEDURE — 99203 OFFICE O/P NEW LOW 30 MIN: CPT | Mod: 25

## 2017-11-06 PROCEDURE — 11100 BX SKIN SUBCUTANEOUS&/MUCOUS MEMBRANE 1 LESION: CPT

## 2017-11-07 ENCOUNTER — APPOINTMENT (OUTPATIENT)
Dept: ORTHOPEDIC SURGERY | Facility: CLINIC | Age: 42
End: 2017-11-07

## 2017-11-07 ENCOUNTER — MEDICATION RENEWAL (OUTPATIENT)
Age: 42
End: 2017-11-07

## 2017-11-07 ENCOUNTER — OUTPATIENT (OUTPATIENT)
Dept: OUTPATIENT SERVICES | Facility: HOSPITAL | Age: 42
LOS: 1 days | Discharge: HOME | End: 2017-11-07

## 2017-11-07 DIAGNOSIS — C46.0 KAPOSI'S SARCOMA OF SKIN: ICD-10-CM

## 2017-11-09 ENCOUNTER — LABORATORY RESULT (OUTPATIENT)
Age: 42
End: 2017-11-09

## 2017-11-09 ENCOUNTER — APPOINTMENT (OUTPATIENT)
Dept: HEMATOLOGY ONCOLOGY | Facility: CLINIC | Age: 42
End: 2017-11-09

## 2017-11-09 ENCOUNTER — RESULT REVIEW (OUTPATIENT)
Age: 42
End: 2017-11-09

## 2017-11-09 ENCOUNTER — APPOINTMENT (OUTPATIENT)
Dept: INFUSION THERAPY | Facility: HOSPITAL | Age: 42
End: 2017-11-09

## 2017-11-09 LAB
BASOPHILS # BLD AUTO: 0 K/UL — SIGNIFICANT CHANGE UP (ref 0–0.2)
BASOPHILS NFR BLD AUTO: 0.8 % — SIGNIFICANT CHANGE UP (ref 0–2)
EOSINOPHIL # BLD AUTO: 0.8 K/UL — HIGH (ref 0–0.5)
EOSINOPHIL NFR BLD AUTO: 16.9 % — HIGH (ref 0–6)
HCT VFR BLD CALC: 41.3 % — SIGNIFICANT CHANGE UP (ref 39–50)
HGB BLD-MCNC: 14.5 G/DL — SIGNIFICANT CHANGE UP (ref 13–17)
LYMPHOCYTES # BLD AUTO: 1.6 K/UL — SIGNIFICANT CHANGE UP (ref 1–3.3)
LYMPHOCYTES # BLD AUTO: 30.9 % — SIGNIFICANT CHANGE UP (ref 13–44)
MCHC RBC-ENTMCNC: 30.8 PG — SIGNIFICANT CHANGE UP (ref 27–34)
MCHC RBC-ENTMCNC: 35.2 G/DL — SIGNIFICANT CHANGE UP (ref 32–36)
MCV RBC AUTO: 87.5 FL — SIGNIFICANT CHANGE UP (ref 80–100)
MONOCYTES # BLD AUTO: 0.5 K/UL — SIGNIFICANT CHANGE UP (ref 0–0.9)
MONOCYTES NFR BLD AUTO: 10.3 % — SIGNIFICANT CHANGE UP (ref 2–14)
NEUTROPHILS # BLD AUTO: 2.1 K/UL — SIGNIFICANT CHANGE UP (ref 1.8–7.4)
NEUTROPHILS NFR BLD AUTO: 41.2 % — LOW (ref 43–77)
PLATELET # BLD AUTO: 170 K/UL — SIGNIFICANT CHANGE UP (ref 150–400)
RBC # BLD: 4.72 M/UL — SIGNIFICANT CHANGE UP (ref 4.2–5.8)
RBC # FLD: 13.4 % — SIGNIFICANT CHANGE UP (ref 10.3–14.5)
WBC # BLD: 5 K/UL — SIGNIFICANT CHANGE UP (ref 3.8–10.5)
WBC # FLD AUTO: 5 K/UL — SIGNIFICANT CHANGE UP (ref 3.8–10.5)

## 2017-11-10 ENCOUNTER — RX RENEWAL (OUTPATIENT)
Age: 42
End: 2017-11-10

## 2017-11-10 DIAGNOSIS — Z51.11 ENCOUNTER FOR ANTINEOPLASTIC CHEMOTHERAPY: ICD-10-CM

## 2017-11-10 DIAGNOSIS — Z51.89 ENCOUNTER FOR OTHER SPECIFIED AFTERCARE: ICD-10-CM

## 2017-11-10 DIAGNOSIS — R11.2 NAUSEA WITH VOMITING, UNSPECIFIED: ICD-10-CM

## 2017-11-13 ENCOUNTER — RESULT REVIEW (OUTPATIENT)
Age: 42
End: 2017-11-13

## 2017-11-14 ENCOUNTER — MESSAGE (OUTPATIENT)
Age: 42
End: 2017-11-14

## 2017-11-30 ENCOUNTER — MEDICATION RENEWAL (OUTPATIENT)
Age: 42
End: 2017-11-30

## 2017-12-05 ENCOUNTER — TRANSCRIPTION ENCOUNTER (OUTPATIENT)
Age: 42
End: 2017-12-05

## 2017-12-07 ENCOUNTER — LABORATORY RESULT (OUTPATIENT)
Age: 42
End: 2017-12-07

## 2017-12-07 ENCOUNTER — APPOINTMENT (OUTPATIENT)
Dept: HEMATOLOGY ONCOLOGY | Facility: CLINIC | Age: 42
End: 2017-12-07
Payer: MEDICAID

## 2017-12-07 ENCOUNTER — APPOINTMENT (OUTPATIENT)
Dept: INFUSION THERAPY | Facility: HOSPITAL | Age: 42
End: 2017-12-07

## 2017-12-07 ENCOUNTER — RESULT REVIEW (OUTPATIENT)
Age: 42
End: 2017-12-07

## 2017-12-07 VITALS
SYSTOLIC BLOOD PRESSURE: 149 MMHG | RESPIRATION RATE: 16 BRPM | HEART RATE: 81 BPM | TEMPERATURE: 97.9 F | DIASTOLIC BLOOD PRESSURE: 88 MMHG | WEIGHT: 188.27 LBS | BODY MASS INDEX: 28.63 KG/M2 | OXYGEN SATURATION: 98 %

## 2017-12-07 LAB
HCT VFR BLD CALC: 42.7 % — SIGNIFICANT CHANGE UP (ref 39–50)
HGB BLD-MCNC: 15.2 G/DL — SIGNIFICANT CHANGE UP (ref 13–17)
MCHC RBC-ENTMCNC: 30.6 PG — SIGNIFICANT CHANGE UP (ref 27–34)
MCHC RBC-ENTMCNC: 35.6 G/DL — SIGNIFICANT CHANGE UP (ref 32–36)
MCV RBC AUTO: 86.1 FL — SIGNIFICANT CHANGE UP (ref 80–100)
PLATELET # BLD AUTO: 230 K/UL — SIGNIFICANT CHANGE UP (ref 150–400)
RBC # BLD: 4.96 M/UL — SIGNIFICANT CHANGE UP (ref 4.2–5.8)
RBC # FLD: 13.4 % — SIGNIFICANT CHANGE UP (ref 10.3–14.5)
WBC # BLD: 4.8 K/UL — SIGNIFICANT CHANGE UP (ref 3.8–10.5)
WBC # FLD AUTO: 4.8 K/UL — SIGNIFICANT CHANGE UP (ref 3.8–10.5)

## 2017-12-07 PROCEDURE — 99215 OFFICE O/P EST HI 40 MIN: CPT

## 2017-12-07 RX ORDER — LORAZEPAM 0.5 MG/1
0.5 TABLET ORAL
Qty: 60 | Refills: 0 | Status: DISCONTINUED | COMMUNITY
Start: 2017-01-19 | End: 2017-12-07

## 2017-12-07 RX ORDER — TRAMADOL HYDROCHLORIDE 50 MG/1
50 TABLET, COATED ORAL
Qty: 9 | Refills: 0 | Status: DISCONTINUED | COMMUNITY
Start: 2017-10-29 | End: 2017-12-07

## 2017-12-12 ENCOUNTER — APPOINTMENT (OUTPATIENT)
Dept: INFECTIOUS DISEASE | Facility: CLINIC | Age: 42
End: 2017-12-12
Payer: MEDICAID

## 2017-12-12 ENCOUNTER — LABORATORY RESULT (OUTPATIENT)
Age: 42
End: 2017-12-12

## 2017-12-12 ENCOUNTER — OUTPATIENT (OUTPATIENT)
Dept: OUTPATIENT SERVICES | Facility: HOSPITAL | Age: 42
LOS: 1 days | End: 2017-12-12
Payer: MEDICAID

## 2017-12-12 VITALS
OXYGEN SATURATION: 98 % | WEIGHT: 187 LBS | HEART RATE: 82 BPM | BODY MASS INDEX: 28.34 KG/M2 | SYSTOLIC BLOOD PRESSURE: 121 MMHG | DIASTOLIC BLOOD PRESSURE: 82 MMHG | TEMPERATURE: 97.4 F | HEIGHT: 68 IN

## 2017-12-12 DIAGNOSIS — B20 HUMAN IMMUNODEFICIENCY VIRUS [HIV] DISEASE: ICD-10-CM

## 2017-12-12 PROCEDURE — 90670 PCV13 VACCINE IM: CPT

## 2017-12-12 PROCEDURE — G0009: CPT

## 2017-12-12 PROCEDURE — 90472 IMMUNIZATION ADMIN EACH ADD: CPT

## 2017-12-12 PROCEDURE — G0463: CPT | Mod: 25

## 2017-12-12 PROCEDURE — 90734 MENACWYD/MENACWYCRM VACC IM: CPT

## 2017-12-12 PROCEDURE — 99214 OFFICE O/P EST MOD 30 MIN: CPT

## 2017-12-26 ENCOUNTER — OUTPATIENT (OUTPATIENT)
Dept: OUTPATIENT SERVICES | Facility: HOSPITAL | Age: 42
LOS: 1 days | Discharge: HOME | End: 2017-12-26

## 2017-12-26 DIAGNOSIS — C46.0 KAPOSI'S SARCOMA OF SKIN: ICD-10-CM

## 2017-12-27 DIAGNOSIS — C46.7 KAPOSI'S SARCOMA OF OTHER SITES: ICD-10-CM

## 2017-12-27 DIAGNOSIS — Z23 ENCOUNTER FOR IMMUNIZATION: ICD-10-CM

## 2017-12-30 ENCOUNTER — RX RENEWAL (OUTPATIENT)
Age: 42
End: 2017-12-30

## 2018-01-02 ENCOUNTER — RX RENEWAL (OUTPATIENT)
Age: 43
End: 2018-01-02

## 2018-01-04 ENCOUNTER — APPOINTMENT (OUTPATIENT)
Dept: INFUSION THERAPY | Facility: HOSPITAL | Age: 43
End: 2018-01-04

## 2018-01-05 ENCOUNTER — RESULT REVIEW (OUTPATIENT)
Age: 43
End: 2018-01-05

## 2018-01-05 ENCOUNTER — LABORATORY RESULT (OUTPATIENT)
Age: 43
End: 2018-01-05

## 2018-01-05 ENCOUNTER — APPOINTMENT (OUTPATIENT)
Dept: INFUSION THERAPY | Facility: HOSPITAL | Age: 43
End: 2018-01-05

## 2018-01-05 LAB
ANISOCYTOSIS BLD QL: SLIGHT — SIGNIFICANT CHANGE UP
BASOPHILS # BLD AUTO: 0 K/UL — SIGNIFICANT CHANGE UP (ref 0–0.2)
BASOPHILS NFR BLD AUTO: 2 % — SIGNIFICANT CHANGE UP (ref 0–2)
EOSINOPHIL # BLD AUTO: 0.2 K/UL — SIGNIFICANT CHANGE UP (ref 0–0.5)
EOSINOPHIL NFR BLD AUTO: 5 % — SIGNIFICANT CHANGE UP (ref 0–6)
HCT VFR BLD CALC: 41 % — SIGNIFICANT CHANGE UP (ref 39–50)
HGB BLD-MCNC: 14.5 G/DL — SIGNIFICANT CHANGE UP (ref 13–17)
HYPOCHROMIA BLD QL: SLIGHT — SIGNIFICANT CHANGE UP
LYMPHOCYTES # BLD AUTO: 1.2 K/UL — SIGNIFICANT CHANGE UP (ref 1–3.3)
LYMPHOCYTES # BLD AUTO: 27 % — SIGNIFICANT CHANGE UP (ref 13–44)
MACROCYTES BLD QL: SLIGHT — SIGNIFICANT CHANGE UP
MCHC RBC-ENTMCNC: 30.5 PG — SIGNIFICANT CHANGE UP (ref 27–34)
MCHC RBC-ENTMCNC: 35.4 G/DL — SIGNIFICANT CHANGE UP (ref 32–36)
MCV RBC AUTO: 86.1 FL — SIGNIFICANT CHANGE UP (ref 80–100)
MONOCYTES # BLD AUTO: 0.6 K/UL — SIGNIFICANT CHANGE UP (ref 0–0.9)
MONOCYTES NFR BLD AUTO: 20 % — HIGH (ref 2–14)
NEUTROPHILS # BLD AUTO: 1.3 K/UL — LOW (ref 1.8–7.4)
NEUTROPHILS NFR BLD AUTO: 46 % — SIGNIFICANT CHANGE UP (ref 43–77)
PLAT MORPH BLD: NORMAL — SIGNIFICANT CHANGE UP
PLATELET # BLD AUTO: 206 K/UL — SIGNIFICANT CHANGE UP (ref 150–400)
POIKILOCYTOSIS BLD QL AUTO: SLIGHT — SIGNIFICANT CHANGE UP
POLYCHROMASIA BLD QL SMEAR: SLIGHT — SIGNIFICANT CHANGE UP
RBC # BLD: 4.76 M/UL — SIGNIFICANT CHANGE UP (ref 4.2–5.8)
RBC # FLD: 13.6 % — SIGNIFICANT CHANGE UP (ref 10.3–14.5)
RBC BLD AUTO: SIGNIFICANT CHANGE UP
WBC # BLD: 3.3 K/UL — LOW (ref 3.8–10.5)
WBC # FLD AUTO: 3.3 K/UL — LOW (ref 3.8–10.5)

## 2018-01-08 DIAGNOSIS — Z51.11 ENCOUNTER FOR ANTINEOPLASTIC CHEMOTHERAPY: ICD-10-CM

## 2018-01-08 DIAGNOSIS — R11.2 NAUSEA WITH VOMITING, UNSPECIFIED: ICD-10-CM

## 2018-01-09 ENCOUNTER — MEDICATION RENEWAL (OUTPATIENT)
Age: 43
End: 2018-01-09

## 2018-01-23 ENCOUNTER — RESULT REVIEW (OUTPATIENT)
Age: 43
End: 2018-01-23

## 2018-01-23 ENCOUNTER — APPOINTMENT (OUTPATIENT)
Dept: HEMATOLOGY ONCOLOGY | Facility: CLINIC | Age: 43
End: 2018-01-23
Payer: MEDICAID

## 2018-01-23 VITALS
WEIGHT: 193.98 LBS | DIASTOLIC BLOOD PRESSURE: 80 MMHG | HEART RATE: 78 BPM | SYSTOLIC BLOOD PRESSURE: 130 MMHG | RESPIRATION RATE: 16 BRPM | TEMPERATURE: 98.7 F | BODY MASS INDEX: 29.5 KG/M2 | OXYGEN SATURATION: 98 %

## 2018-01-23 LAB
ANISOCYTOSIS BLD QL: SLIGHT — SIGNIFICANT CHANGE UP
BASOPHILS # BLD AUTO: 0 K/UL — SIGNIFICANT CHANGE UP (ref 0–0.2)
BASOPHILS NFR BLD AUTO: 1 % — SIGNIFICANT CHANGE UP (ref 0–2)
EOSINOPHIL # BLD AUTO: 0.3 K/UL — SIGNIFICANT CHANGE UP (ref 0–0.5)
EOSINOPHIL NFR BLD AUTO: 1 % — SIGNIFICANT CHANGE UP (ref 0–6)
HCT VFR BLD CALC: 39.2 % — SIGNIFICANT CHANGE UP (ref 39–50)
HGB BLD-MCNC: 13.9 G/DL — SIGNIFICANT CHANGE UP (ref 13–17)
HYPOCHROMIA BLD QL: SLIGHT — SIGNIFICANT CHANGE UP
LYMPHOCYTES # BLD AUTO: 0.8 K/UL — LOW (ref 1–3.3)
LYMPHOCYTES # BLD AUTO: 33 % — SIGNIFICANT CHANGE UP (ref 13–44)
MACROCYTES BLD QL: SLIGHT — SIGNIFICANT CHANGE UP
MCHC RBC-ENTMCNC: 30.3 PG — SIGNIFICANT CHANGE UP (ref 27–34)
MCHC RBC-ENTMCNC: 35.5 G/DL — SIGNIFICANT CHANGE UP (ref 32–36)
MCV RBC AUTO: 85.4 FL — SIGNIFICANT CHANGE UP (ref 80–100)
MONOCYTES # BLD AUTO: 0.4 K/UL — SIGNIFICANT CHANGE UP (ref 0–0.9)
MONOCYTES NFR BLD AUTO: 15 % — HIGH (ref 2–14)
NEUTROPHILS # BLD AUTO: 1.3 K/UL — LOW (ref 1.8–7.4)
NEUTROPHILS NFR BLD AUTO: 50 % — SIGNIFICANT CHANGE UP (ref 43–77)
PLAT MORPH BLD: NORMAL — SIGNIFICANT CHANGE UP
PLATELET # BLD AUTO: 199 K/UL — SIGNIFICANT CHANGE UP (ref 150–400)
POIKILOCYTOSIS BLD QL AUTO: SLIGHT — SIGNIFICANT CHANGE UP
POLYCHROMASIA BLD QL SMEAR: SLIGHT — SIGNIFICANT CHANGE UP
RBC # BLD: 4.59 M/UL — SIGNIFICANT CHANGE UP (ref 4.2–5.8)
RBC # FLD: 14.1 % — SIGNIFICANT CHANGE UP (ref 10.3–14.5)
RBC BLD AUTO: SIGNIFICANT CHANGE UP
WBC # BLD: 2.7 K/UL — LOW (ref 3.8–10.5)
WBC # FLD AUTO: 2.7 K/UL — LOW (ref 3.8–10.5)

## 2018-01-23 PROCEDURE — 99214 OFFICE O/P EST MOD 30 MIN: CPT

## 2018-01-29 ENCOUNTER — LABORATORY RESULT (OUTPATIENT)
Age: 43
End: 2018-01-29

## 2018-01-29 ENCOUNTER — APPOINTMENT (OUTPATIENT)
Dept: DERMATOLOGY | Facility: CLINIC | Age: 43
End: 2018-01-29
Payer: MEDICAID

## 2018-01-29 PROCEDURE — 12032 INTMD RPR S/A/T/EXT 2.6-7.5: CPT

## 2018-01-29 PROCEDURE — 11424 EXC H-F-NK-SP B9+MARG 3.1-4: CPT

## 2018-01-31 ENCOUNTER — MEDICATION RENEWAL (OUTPATIENT)
Age: 43
End: 2018-01-31

## 2018-01-31 ENCOUNTER — TRANSCRIPTION ENCOUNTER (OUTPATIENT)
Age: 43
End: 2018-01-31

## 2018-02-02 ENCOUNTER — OUTPATIENT (OUTPATIENT)
Dept: OUTPATIENT SERVICES | Facility: HOSPITAL | Age: 43
LOS: 1 days | Discharge: HOME | End: 2018-02-02

## 2018-02-02 DIAGNOSIS — C46.0 KAPOSI'S SARCOMA OF SKIN: ICD-10-CM

## 2018-02-05 ENCOUNTER — TRANSCRIPTION ENCOUNTER (OUTPATIENT)
Age: 43
End: 2018-02-05

## 2018-02-05 ENCOUNTER — MEDICATION RENEWAL (OUTPATIENT)
Age: 43
End: 2018-02-05

## 2018-02-08 ENCOUNTER — APPOINTMENT (OUTPATIENT)
Dept: INFUSION THERAPY | Facility: HOSPITAL | Age: 43
End: 2018-02-08

## 2018-02-08 ENCOUNTER — LABORATORY RESULT (OUTPATIENT)
Age: 43
End: 2018-02-08

## 2018-02-08 ENCOUNTER — RESULT REVIEW (OUTPATIENT)
Age: 43
End: 2018-02-08

## 2018-02-08 ENCOUNTER — APPOINTMENT (OUTPATIENT)
Dept: HEMATOLOGY ONCOLOGY | Facility: CLINIC | Age: 43
End: 2018-02-08

## 2018-02-08 LAB
HCT VFR BLD CALC: 40.5 % — SIGNIFICANT CHANGE UP (ref 39–50)
HGB BLD-MCNC: 14 G/DL — SIGNIFICANT CHANGE UP (ref 13–17)
MCHC RBC-ENTMCNC: 29.8 PG — SIGNIFICANT CHANGE UP (ref 27–34)
MCHC RBC-ENTMCNC: 34.5 G/DL — SIGNIFICANT CHANGE UP (ref 32–36)
MCV RBC AUTO: 86.4 FL — SIGNIFICANT CHANGE UP (ref 80–100)
PLATELET # BLD AUTO: 183 K/UL — SIGNIFICANT CHANGE UP (ref 150–400)
RBC # BLD: 4.69 M/UL — SIGNIFICANT CHANGE UP (ref 4.2–5.8)
RBC # FLD: 13.8 % — SIGNIFICANT CHANGE UP (ref 10.3–14.5)
WBC # BLD: 3.8 K/UL — SIGNIFICANT CHANGE UP (ref 3.8–10.5)
WBC # FLD AUTO: 3.8 K/UL — SIGNIFICANT CHANGE UP (ref 3.8–10.5)

## 2018-02-09 DIAGNOSIS — R11.2 NAUSEA WITH VOMITING, UNSPECIFIED: ICD-10-CM

## 2018-02-09 DIAGNOSIS — Z51.11 ENCOUNTER FOR ANTINEOPLASTIC CHEMOTHERAPY: ICD-10-CM

## 2018-02-26 ENCOUNTER — TRANSCRIPTION ENCOUNTER (OUTPATIENT)
Age: 43
End: 2018-02-26

## 2018-03-02 ENCOUNTER — OUTPATIENT (OUTPATIENT)
Dept: OUTPATIENT SERVICES | Facility: HOSPITAL | Age: 43
LOS: 1 days | Discharge: HOME | End: 2018-03-02

## 2018-03-02 DIAGNOSIS — C46.0 KAPOSI'S SARCOMA OF SKIN: ICD-10-CM

## 2018-03-08 ENCOUNTER — RESULT REVIEW (OUTPATIENT)
Age: 43
End: 2018-03-08

## 2018-03-08 ENCOUNTER — LABORATORY RESULT (OUTPATIENT)
Age: 43
End: 2018-03-08

## 2018-03-08 ENCOUNTER — APPOINTMENT (OUTPATIENT)
Dept: INFUSION THERAPY | Facility: HOSPITAL | Age: 43
End: 2018-03-08

## 2018-03-08 LAB
BASOPHILS # BLD AUTO: 0 K/UL — SIGNIFICANT CHANGE UP (ref 0–0.2)
EOSINOPHIL # BLD AUTO: 0.1 K/UL — SIGNIFICANT CHANGE UP (ref 0–0.5)
EOSINOPHIL NFR BLD AUTO: 4 % — SIGNIFICANT CHANGE UP (ref 0–6)
HCT VFR BLD CALC: 37.4 % — LOW (ref 39–50)
HGB BLD-MCNC: 13.5 G/DL — SIGNIFICANT CHANGE UP (ref 13–17)
LYMPHOCYTES # BLD AUTO: 0.8 K/UL — LOW (ref 1–3.3)
LYMPHOCYTES # BLD AUTO: 31 % — SIGNIFICANT CHANGE UP (ref 13–44)
MCHC RBC-ENTMCNC: 31.3 PG — SIGNIFICANT CHANGE UP (ref 27–34)
MCHC RBC-ENTMCNC: 36.1 G/DL — HIGH (ref 32–36)
MCV RBC AUTO: 86.7 FL — SIGNIFICANT CHANGE UP (ref 80–100)
MONOCYTES # BLD AUTO: 0.6 K/UL — SIGNIFICANT CHANGE UP (ref 0–0.9)
MONOCYTES NFR BLD AUTO: 14 % — SIGNIFICANT CHANGE UP (ref 2–14)
NEUTROPHILS # BLD AUTO: 1.4 K/UL — LOW (ref 1.8–7.4)
NEUTROPHILS NFR BLD AUTO: 51 % — SIGNIFICANT CHANGE UP (ref 43–77)
PLAT MORPH BLD: NORMAL — SIGNIFICANT CHANGE UP
PLATELET # BLD AUTO: 217 K/UL — SIGNIFICANT CHANGE UP (ref 150–400)
RBC # BLD: 4.32 M/UL — SIGNIFICANT CHANGE UP (ref 4.2–5.8)
RBC # FLD: 13.5 % — SIGNIFICANT CHANGE UP (ref 10.3–14.5)
RBC BLD AUTO: SIGNIFICANT CHANGE UP
WBC # BLD: 3 K/UL — LOW (ref 3.8–10.5)
WBC # FLD AUTO: 3 K/UL — LOW (ref 3.8–10.5)

## 2018-03-09 DIAGNOSIS — R11.2 NAUSEA WITH VOMITING, UNSPECIFIED: ICD-10-CM

## 2018-03-09 DIAGNOSIS — Z51.11 ENCOUNTER FOR ANTINEOPLASTIC CHEMOTHERAPY: ICD-10-CM

## 2018-03-12 ENCOUNTER — OUTPATIENT (OUTPATIENT)
Dept: OUTPATIENT SERVICES | Facility: HOSPITAL | Age: 43
LOS: 1 days | End: 2018-03-12
Payer: MEDICAID

## 2018-03-12 ENCOUNTER — APPOINTMENT (OUTPATIENT)
Dept: INFECTIOUS DISEASE | Facility: CLINIC | Age: 43
End: 2018-03-12

## 2018-03-12 ENCOUNTER — APPOINTMENT (OUTPATIENT)
Dept: INFECTIOUS DISEASE | Facility: CLINIC | Age: 43
End: 2018-03-12
Payer: MEDICAID

## 2018-03-12 VITALS
HEART RATE: 74 BPM | OXYGEN SATURATION: 98 % | BODY MASS INDEX: 30.46 KG/M2 | SYSTOLIC BLOOD PRESSURE: 129 MMHG | TEMPERATURE: 98.3 F | HEIGHT: 68 IN | RESPIRATION RATE: 16 BRPM | WEIGHT: 201 LBS | DIASTOLIC BLOOD PRESSURE: 83 MMHG

## 2018-03-12 DIAGNOSIS — B20 HUMAN IMMUNODEFICIENCY VIRUS [HIV] DISEASE: ICD-10-CM

## 2018-03-12 PROCEDURE — G0463: CPT | Mod: 25

## 2018-03-12 PROCEDURE — 90734 MENACWYD/MENACWYCRM VACC IM: CPT

## 2018-03-12 PROCEDURE — 90471 IMMUNIZATION ADMIN: CPT

## 2018-03-12 PROCEDURE — 99215 OFFICE O/P EST HI 40 MIN: CPT

## 2018-03-16 ENCOUNTER — MEDICATION RENEWAL (OUTPATIENT)
Age: 43
End: 2018-03-16

## 2018-03-16 RX ORDER — ALBUTEROL SULFATE 90 UG/1
108 (90 BASE) AEROSOL, METERED RESPIRATORY (INHALATION)
Qty: 1 | Refills: 5 | Status: COMPLETED | COMMUNITY
Start: 2017-12-07 | End: 2018-03-16

## 2018-03-22 ENCOUNTER — RX RENEWAL (OUTPATIENT)
Age: 43
End: 2018-03-22

## 2018-03-22 DIAGNOSIS — F41.9 ANXIETY DISORDER, UNSPECIFIED: ICD-10-CM

## 2018-03-22 DIAGNOSIS — R60.0 LOCALIZED EDEMA: ICD-10-CM

## 2018-03-22 DIAGNOSIS — Z23 ENCOUNTER FOR IMMUNIZATION: ICD-10-CM

## 2018-03-22 DIAGNOSIS — C46.9 KAPOSI'S SARCOMA, UNSPECIFIED: ICD-10-CM

## 2018-03-26 LAB
ALBUMIN SERPL ELPH-MCNC: 3.1 G/DL
ALP BLD-CCNC: 59 U/L
ALT SERPL-CCNC: 53 U/L
ANION GAP SERPL CALC-SCNC: 12 MMOL/L
AST SERPL-CCNC: 38 U/L
BASOPHILS # BLD AUTO: 0.01 K/UL
BASOPHILS NFR BLD AUTO: 0.2 %
BILIRUB SERPL-MCNC: 0.6 MG/DL
BUN SERPL-MCNC: 16 MG/DL
CALCIUM SERPL-MCNC: 8.8 MG/DL
CD3 CELLS # BLD: 910 /UL
CD3 CELLS NFR BLD: 87 %
CD3+CD4+ CELLS # BLD: 140 /UL
CD3+CD4+ CELLS NFR BLD: 13 %
CD3+CD4+ CELLS/CD3+CD8+ CLL SPEC: 0.18 RATIO
CD3+CD8+ CELLS # SPEC: 769 /UL
CD3+CD8+ CELLS NFR BLD: 74 %
CHLORIDE SERPL-SCNC: 100 MMOL/L
CO2 SERPL-SCNC: 24 MMOL/L
CREAT SERPL-MCNC: 0.92 MG/DL
EOSINOPHIL # BLD AUTO: 0.1 K/UL
EOSINOPHIL NFR BLD AUTO: 2 %
GLUCOSE SERPL-MCNC: 86 MG/DL
HCT VFR BLD CALC: 43.5 %
HGB BLD-MCNC: 14.8 G/DL
HIV1 RNA # SERPL NAA+PROBE: 93
HIV1 RNA # SERPL NAA+PROBE: ABNORMAL
IMM GRANULOCYTES NFR BLD AUTO: 0.2 %
LYMPHOCYTES # BLD AUTO: 0.99 K/UL
LYMPHOCYTES NFR BLD AUTO: 20.2 %
MAN DIFF?: NORMAL
MCHC RBC-ENTMCNC: 30.1 PG
MCHC RBC-ENTMCNC: 34 GM/DL
MCV RBC AUTO: 88.6 FL
MONOCYTES # BLD AUTO: 0.82 K/UL
MONOCYTES NFR BLD AUTO: 16.8 %
NEUTROPHILS # BLD AUTO: 2.96 K/UL
NEUTROPHILS NFR BLD AUTO: 60.6 %
PLATELET # BLD AUTO: 227 K/UL
POTASSIUM SERPL-SCNC: 4.7 MMOL/L
PROT SERPL-MCNC: 7.3 G/DL
RBC # BLD: 4.91 M/UL
RBC # FLD: 14.6 %
SODIUM SERPL-SCNC: 136 MMOL/L
VIRAL LOAD INTERP: NORMAL
VIRAL LOAD LOG: 1.97
WBC # FLD AUTO: 4.89 K/UL

## 2018-03-28 ENCOUNTER — MEDICATION RENEWAL (OUTPATIENT)
Age: 43
End: 2018-03-28

## 2018-03-28 ENCOUNTER — TRANSCRIPTION ENCOUNTER (OUTPATIENT)
Age: 43
End: 2018-03-28

## 2018-04-03 ENCOUNTER — OUTPATIENT (OUTPATIENT)
Dept: OUTPATIENT SERVICES | Facility: HOSPITAL | Age: 43
LOS: 1 days | Discharge: HOME | End: 2018-04-03

## 2018-04-03 ENCOUNTER — OUTPATIENT (OUTPATIENT)
Dept: OUTPATIENT SERVICES | Facility: HOSPITAL | Age: 43
LOS: 1 days | End: 2018-04-03
Payer: MEDICAID

## 2018-04-03 ENCOUNTER — APPOINTMENT (OUTPATIENT)
Dept: INFECTIOUS DISEASE | Facility: CLINIC | Age: 43
End: 2018-04-03
Payer: MEDICAID

## 2018-04-03 DIAGNOSIS — B20 HUMAN IMMUNODEFICIENCY VIRUS [HIV] DISEASE: ICD-10-CM

## 2018-04-03 DIAGNOSIS — C46.0 KAPOSI'S SARCOMA OF SKIN: ICD-10-CM

## 2018-04-03 DIAGNOSIS — F41.3 OTHER MIXED ANXIETY DISORDERS: ICD-10-CM

## 2018-04-03 PROCEDURE — 90791 PSYCH DIAGNOSTIC EVALUATION: CPT

## 2018-04-05 ENCOUNTER — LABORATORY RESULT (OUTPATIENT)
Age: 43
End: 2018-04-05

## 2018-04-05 ENCOUNTER — RESULT REVIEW (OUTPATIENT)
Age: 43
End: 2018-04-05

## 2018-04-05 ENCOUNTER — APPOINTMENT (OUTPATIENT)
Dept: INFUSION THERAPY | Facility: HOSPITAL | Age: 43
End: 2018-04-05

## 2018-04-05 ENCOUNTER — APPOINTMENT (OUTPATIENT)
Dept: HEMATOLOGY ONCOLOGY | Facility: CLINIC | Age: 43
End: 2018-04-05
Payer: MEDICAID

## 2018-04-05 VITALS
TEMPERATURE: 98.7 F | DIASTOLIC BLOOD PRESSURE: 70 MMHG | BODY MASS INDEX: 30.17 KG/M2 | RESPIRATION RATE: 16 BRPM | HEART RATE: 82 BPM | SYSTOLIC BLOOD PRESSURE: 120 MMHG | OXYGEN SATURATION: 98 % | WEIGHT: 198.39 LBS

## 2018-04-05 DIAGNOSIS — Z86.2 PERSONAL HISTORY OF DISEASES OF THE BLOOD AND BLOOD-FORMING ORGANS AND CERTAIN DISORDERS INVOLVING THE IMMUNE MECHANISM: ICD-10-CM

## 2018-04-05 DIAGNOSIS — F43.22 ADJUSTMENT DISORDER WITH ANXIETY: ICD-10-CM

## 2018-04-05 DIAGNOSIS — L72.9 FOLLICULAR CYST OF THE SKIN AND SUBCUTANEOUS TISSUE, UNSPECIFIED: ICD-10-CM

## 2018-04-05 DIAGNOSIS — Z87.2 PERSONAL HISTORY OF DISEASES OF THE SKIN AND SUBCUTANEOUS TISSUE: ICD-10-CM

## 2018-04-05 LAB
BASOPHILS # BLD AUTO: 0 K/UL — SIGNIFICANT CHANGE UP (ref 0–0.2)
BASOPHILS NFR BLD AUTO: 0.8 % — SIGNIFICANT CHANGE UP (ref 0–2)
EOSINOPHIL # BLD AUTO: 0.2 K/UL — SIGNIFICANT CHANGE UP (ref 0–0.5)
EOSINOPHIL NFR BLD AUTO: 5.6 % — SIGNIFICANT CHANGE UP (ref 0–6)
HCT VFR BLD CALC: 40.1 % — SIGNIFICANT CHANGE UP (ref 39–50)
HGB BLD-MCNC: 14.3 G/DL — SIGNIFICANT CHANGE UP (ref 13–17)
LYMPHOCYTES # BLD AUTO: 1.1 K/UL — SIGNIFICANT CHANGE UP (ref 1–3.3)
LYMPHOCYTES # BLD AUTO: 30.1 % — SIGNIFICANT CHANGE UP (ref 13–44)
MCHC RBC-ENTMCNC: 30.9 PG — SIGNIFICANT CHANGE UP (ref 27–34)
MCHC RBC-ENTMCNC: 35.5 G/DL — SIGNIFICANT CHANGE UP (ref 32–36)
MCV RBC AUTO: 87 FL — SIGNIFICANT CHANGE UP (ref 80–100)
MONOCYTES # BLD AUTO: 0.5 K/UL — SIGNIFICANT CHANGE UP (ref 0–0.9)
MONOCYTES NFR BLD AUTO: 15.2 % — HIGH (ref 2–14)
NEUTROPHILS # BLD AUTO: 1.7 K/UL — LOW (ref 1.8–7.4)
NEUTROPHILS NFR BLD AUTO: 48.2 % — SIGNIFICANT CHANGE UP (ref 43–77)
PLATELET # BLD AUTO: 236 K/UL — SIGNIFICANT CHANGE UP (ref 150–400)
RBC # BLD: 4.61 M/UL — SIGNIFICANT CHANGE UP (ref 4.2–5.8)
RBC # FLD: 13.4 % — SIGNIFICANT CHANGE UP (ref 10.3–14.5)
WBC # BLD: 3.5 K/UL — LOW (ref 3.8–10.5)
WBC # FLD AUTO: 3.5 K/UL — LOW (ref 3.8–10.5)

## 2018-04-05 PROCEDURE — 99215 OFFICE O/P EST HI 40 MIN: CPT

## 2018-04-05 RX ORDER — BUSPIRONE HYDROCHLORIDE 5 MG/1
5 TABLET ORAL
Qty: 60 | Refills: 0 | Status: DISCONTINUED | COMMUNITY
Start: 2018-03-16 | End: 2018-04-05

## 2018-04-06 ENCOUNTER — TRANSCRIPTION ENCOUNTER (OUTPATIENT)
Age: 43
End: 2018-04-06

## 2018-04-06 DIAGNOSIS — R11.2 NAUSEA WITH VOMITING, UNSPECIFIED: ICD-10-CM

## 2018-04-06 DIAGNOSIS — Z51.11 ENCOUNTER FOR ANTINEOPLASTIC CHEMOTHERAPY: ICD-10-CM

## 2018-04-11 ENCOUNTER — TRANSCRIPTION ENCOUNTER (OUTPATIENT)
Age: 43
End: 2018-04-11

## 2018-04-11 ENCOUNTER — RX RENEWAL (OUTPATIENT)
Age: 43
End: 2018-04-11

## 2018-04-13 ENCOUNTER — TRANSCRIPTION ENCOUNTER (OUTPATIENT)
Age: 43
End: 2018-04-13

## 2018-04-13 ENCOUNTER — MEDICATION RENEWAL (OUTPATIENT)
Age: 43
End: 2018-04-13

## 2018-04-18 ENCOUNTER — RX RENEWAL (OUTPATIENT)
Age: 43
End: 2018-04-18

## 2018-04-19 ENCOUNTER — TRANSCRIPTION ENCOUNTER (OUTPATIENT)
Age: 43
End: 2018-04-19

## 2018-05-31 ENCOUNTER — MEDICATION RENEWAL (OUTPATIENT)
Age: 43
End: 2018-05-31

## 2018-06-18 ENCOUNTER — APPOINTMENT (OUTPATIENT)
Dept: INFECTIOUS DISEASE | Facility: CLINIC | Age: 43
End: 2018-06-18

## 2018-07-09 ENCOUNTER — MEDICATION RENEWAL (OUTPATIENT)
Age: 43
End: 2018-07-09

## 2018-07-10 ENCOUNTER — OUTPATIENT (OUTPATIENT)
Dept: OUTPATIENT SERVICES | Facility: HOSPITAL | Age: 43
LOS: 1 days | End: 2018-07-10
Payer: MEDICAID

## 2018-07-10 ENCOUNTER — APPOINTMENT (OUTPATIENT)
Dept: INFECTIOUS DISEASE | Facility: CLINIC | Age: 43
End: 2018-07-10

## 2018-07-10 ENCOUNTER — APPOINTMENT (OUTPATIENT)
Dept: INFECTIOUS DISEASE | Facility: CLINIC | Age: 43
End: 2018-07-10
Payer: MEDICAID

## 2018-07-10 VITALS
HEIGHT: 68 IN | SYSTOLIC BLOOD PRESSURE: 119 MMHG | DIASTOLIC BLOOD PRESSURE: 80 MMHG | OXYGEN SATURATION: 99 % | TEMPERATURE: 97.3 F | HEART RATE: 72 BPM | WEIGHT: 180 LBS | BODY MASS INDEX: 27.28 KG/M2

## 2018-07-10 DIAGNOSIS — B20 HUMAN IMMUNODEFICIENCY VIRUS [HIV] DISEASE: ICD-10-CM

## 2018-07-10 LAB
HBA1C BLD-MCNC: 5.2 % — SIGNIFICANT CHANGE UP (ref 4–5.6)
HCT VFR BLD CALC: 42.4 % — SIGNIFICANT CHANGE UP (ref 39–50)
HGB BLD-MCNC: 14.2 G/DL — SIGNIFICANT CHANGE UP (ref 13–17)
MCHC RBC-ENTMCNC: 29.8 PG — SIGNIFICANT CHANGE UP (ref 27–34)
MCHC RBC-ENTMCNC: 33.5 GM/DL — SIGNIFICANT CHANGE UP (ref 32–36)
MCV RBC AUTO: 89.1 FL — SIGNIFICANT CHANGE UP (ref 80–100)
PLATELET # BLD AUTO: 173 K/UL — SIGNIFICANT CHANGE UP (ref 150–400)
RBC # BLD: 4.76 M/UL — SIGNIFICANT CHANGE UP (ref 4.2–5.8)
RBC # FLD: 13.9 % — SIGNIFICANT CHANGE UP (ref 10.3–14.5)
WBC # BLD: 3.65 K/UL — LOW (ref 3.8–10.5)
WBC # FLD AUTO: 3.65 K/UL — LOW (ref 3.8–10.5)

## 2018-07-10 PROCEDURE — 86803 HEPATITIS C AB TEST: CPT

## 2018-07-10 PROCEDURE — 86592 SYPHILIS TEST NON-TREP QUAL: CPT

## 2018-07-10 PROCEDURE — 99213 OFFICE O/P EST LOW 20 MIN: CPT

## 2018-07-10 PROCEDURE — G0463: CPT | Mod: 25

## 2018-07-10 PROCEDURE — 82610 CYSTATIN C: CPT

## 2018-07-10 PROCEDURE — 87491 CHLMYD TRACH DNA AMP PROBE: CPT

## 2018-07-10 PROCEDURE — 99214 OFFICE O/P EST MOD 30 MIN: CPT

## 2018-07-10 PROCEDURE — 86706 HEP B SURFACE ANTIBODY: CPT

## 2018-07-10 PROCEDURE — 80061 LIPID PANEL: CPT

## 2018-07-10 PROCEDURE — G0463: CPT

## 2018-07-10 PROCEDURE — 90834 PSYTX W PT 45 MINUTES: CPT

## 2018-07-10 PROCEDURE — 80053 COMPREHEN METABOLIC PANEL: CPT

## 2018-07-10 PROCEDURE — 87340 HEPATITIS B SURFACE AG IA: CPT

## 2018-07-10 PROCEDURE — 87522 HEPATITIS C REVRS TRNSCRPJ: CPT

## 2018-07-10 PROCEDURE — 87536 HIV-1 QUANT&REVRSE TRNSCRPJ: CPT

## 2018-07-10 PROCEDURE — 86360 T CELL ABSOLUTE COUNT/RATIO: CPT

## 2018-07-10 PROCEDURE — 84443 ASSAY THYROID STIM HORMONE: CPT

## 2018-07-10 PROCEDURE — 82306 VITAMIN D 25 HYDROXY: CPT

## 2018-07-10 PROCEDURE — 83036 HEMOGLOBIN GLYCOSYLATED A1C: CPT

## 2018-07-10 PROCEDURE — 81001 URINALYSIS AUTO W/SCOPE: CPT

## 2018-07-10 PROCEDURE — 87591 N.GONORRHOEAE DNA AMP PROB: CPT

## 2018-07-10 PROCEDURE — 86704 HEP B CORE ANTIBODY TOTAL: CPT

## 2018-07-10 PROCEDURE — 84100 ASSAY OF PHOSPHORUS: CPT

## 2018-07-10 PROCEDURE — 86593 SYPHILIS TEST NON-TREP QUANT: CPT

## 2018-07-10 PROCEDURE — 84156 ASSAY OF PROTEIN URINE: CPT

## 2018-07-10 PROCEDURE — 85027 COMPLETE CBC AUTOMATED: CPT

## 2018-07-10 PROCEDURE — 87517 HEPATITIS B DNA QUANT: CPT

## 2018-07-10 PROCEDURE — 86780 TREPONEMA PALLIDUM: CPT

## 2018-07-10 RX ORDER — EMTRICITABINE AND TENOFOVIR ALAFENAMIDE 200; 25 MG/1; MG/1
200-25 TABLET ORAL
Qty: 30 | Refills: 3 | Status: COMPLETED | COMMUNITY
Start: 2017-01-18 | End: 2018-07-10

## 2018-07-11 LAB
24R-OH-CALCIDIOL SERPL-MCNC: 27.6 NG/ML — LOW (ref 30–80)
4/8 RATIO: 0.23 RATIO — LOW (ref 0.9–3.6)
ABS CD8: 838 /UL — HIGH (ref 136–757)
ALBUMIN SERPL ELPH-MCNC: 3.5 G/DL — SIGNIFICANT CHANGE UP (ref 3.3–5)
ALP SERPL-CCNC: 55 U/L — SIGNIFICANT CHANGE UP (ref 40–120)
ALT FLD-CCNC: 22 U/L — SIGNIFICANT CHANGE UP (ref 10–45)
ANION GAP SERPL CALC-SCNC: 10 MMOL/L — SIGNIFICANT CHANGE UP (ref 5–17)
APPEARANCE UR: CLEAR — SIGNIFICANT CHANGE UP
AST SERPL-CCNC: 27 U/L — SIGNIFICANT CHANGE UP (ref 10–40)
BACTERIA # UR AUTO: NEGATIVE — SIGNIFICANT CHANGE UP
BILIRUB SERPL-MCNC: 0.8 MG/DL — SIGNIFICANT CHANGE UP (ref 0.2–1.2)
BILIRUB UR-MCNC: ABNORMAL
BUN SERPL-MCNC: 17 MG/DL — SIGNIFICANT CHANGE UP (ref 7–23)
C TRACH RRNA SPEC QL NAA+PROBE: SIGNIFICANT CHANGE UP
CALCIUM SERPL-MCNC: 8.3 MG/DL — LOW (ref 8.4–10.5)
CD3 BLASTS SPEC-ACNC: 1032 /UL — SIGNIFICANT CHANGE UP (ref 799–2171)
CD3 BLASTS SPEC-ACNC: 86 % — HIGH (ref 59–85)
CD4 %: 16 % — LOW (ref 36–65)
CD8 %: 70 % — HIGH (ref 11–36)
CHLORIDE SERPL-SCNC: 100 MMOL/L — SIGNIFICANT CHANGE UP (ref 96–108)
CHOLEST SERPL-MCNC: 159 MG/DL — SIGNIFICANT CHANGE UP (ref 10–199)
CO2 SERPL-SCNC: 25 MMOL/L — SIGNIFICANT CHANGE UP (ref 22–31)
COLOR SPEC: ABNORMAL
CREAT ?TM UR-MCNC: 177 MG/DL — SIGNIFICANT CHANGE UP
CREAT SERPL-MCNC: 0.9 MG/DL — SIGNIFICANT CHANGE UP (ref 0.5–1.3)
CYSTATIN C SERPL-MCNC: 0.93 MG/L — SIGNIFICANT CHANGE UP (ref 0.68–1.22)
DIFF PNL FLD: NEGATIVE — SIGNIFICANT CHANGE UP
EPI CELLS # UR: 0 /HPF — SIGNIFICANT CHANGE UP (ref 0–5)
GFR/BSA.PRED SERPLBLD CYS-BASED-ARV: 91 ML/MIN — SIGNIFICANT CHANGE UP
GLUCOSE SERPL-MCNC: 57 MG/DL — LOW (ref 70–99)
GLUCOSE UR QL: NEGATIVE MG/DL — SIGNIFICANT CHANGE UP
HBV CORE AB SER-ACNC: REACTIVE
HBV SURFACE AB SER-ACNC: SIGNIFICANT CHANGE UP
HBV SURFACE AG SER-ACNC: SIGNIFICANT CHANGE UP
HCV AB S/CO SERPL IA: 0.19 S/CO — SIGNIFICANT CHANGE UP
HCV AB SERPL-IMP: SIGNIFICANT CHANGE UP
HCV RNA SERPL NAA DL=5-ACNC: SIGNIFICANT CHANGE UP
HCV RNA SPEC NAA+PROBE-LOG IU: SIGNIFICANT CHANGE UP LOGIU/ML
HDLC SERPL-MCNC: 49 MG/DL — SIGNIFICANT CHANGE UP (ref 40–125)
HIV-1 VIRAL LOAD RESULT: ABNORMAL
HIV1 RNA # SERPL NAA+PROBE: 829 — SIGNIFICANT CHANGE UP
HIV1 RNA SER-IMP: SIGNIFICANT CHANGE UP
HIV1 RNA SERPL NAA+PROBE-ACNC: ABNORMAL
HIV1 RNA SERPL NAA+PROBE-LOG#: 2.92 — SIGNIFICANT CHANGE UP
KETONES UR-MCNC: ABNORMAL
LEUKOCYTE ESTERASE UR-ACNC: NEGATIVE — SIGNIFICANT CHANGE UP
LIPID PNL WITH DIRECT LDL SERPL: 83 MG/DL — SIGNIFICANT CHANGE UP
N GONORRHOEA RRNA SPEC QL NAA+PROBE: SIGNIFICANT CHANGE UP
NITRITE UR-MCNC: NEGATIVE — SIGNIFICANT CHANGE UP
PH UR: 6.5 — SIGNIFICANT CHANGE UP (ref 5–8)
PHOSPHATE SERPL-MCNC: 4 MG/DL — SIGNIFICANT CHANGE UP (ref 2.5–4.5)
POTASSIUM SERPL-MCNC: 4.2 MMOL/L — SIGNIFICANT CHANGE UP (ref 3.5–5.3)
POTASSIUM SERPL-SCNC: 4.2 MMOL/L — SIGNIFICANT CHANGE UP (ref 3.5–5.3)
PROT ?TM UR-MCNC: 30 MG/DL — HIGH (ref 0–12)
PROT SERPL-MCNC: 7.5 G/DL — SIGNIFICANT CHANGE UP (ref 6–8.3)
PROT UR-MCNC: ABNORMAL MG/DL
PROT/CREAT UR-RTO: 0.2 RATIO — SIGNIFICANT CHANGE UP (ref 0–0.2)
RBC CASTS # UR COMP ASSIST: 4 /HPF — SIGNIFICANT CHANGE UP (ref 0–4)
SODIUM SERPL-SCNC: 135 MMOL/L — SIGNIFICANT CHANGE UP (ref 135–145)
SP GR SPEC: 1.03 — HIGH (ref 1.01–1.02)
SPECIMEN SOURCE: SIGNIFICANT CHANGE UP
T-CELL CD4 SUBSET PNL BLD: 189 /UL — LOW (ref 489–1457)
TOTAL CHOLESTEROL/HDL RATIO MEASUREMENT: 3.2 RATIO — LOW (ref 3.4–9.6)
TRIGL SERPL-MCNC: 137 MG/DL — SIGNIFICANT CHANGE UP (ref 10–149)
TSH SERPL-MCNC: 1.59 UIU/ML — SIGNIFICANT CHANGE UP (ref 0.27–4.2)
UROBILINOGEN FLD QL: 2 MG/DL
WBC UR QL: 0 /HPF — SIGNIFICANT CHANGE UP (ref 0–5)

## 2018-07-12 ENCOUNTER — OUTPATIENT (OUTPATIENT)
Dept: OUTPATIENT SERVICES | Facility: HOSPITAL | Age: 43
LOS: 1 days | Discharge: HOME | End: 2018-07-12

## 2018-07-12 DIAGNOSIS — C46.0 KAPOSI'S SARCOMA OF SKIN: ICD-10-CM

## 2018-07-12 LAB
HBV DNA # SERPL NAA+PROBE: SIGNIFICANT CHANGE UP
HBV DNA SERPL NAA+PROBE-LOG#: SIGNIFICANT CHANGE UP LOGIU/ML
RPR SERPL-ACNC: SIGNIFICANT CHANGE UP
T PALLIDUM AB TITR SER: POSITIVE
T PALLIDUM IGG SER QL IF: REACTIVE

## 2018-07-16 ENCOUNTER — APPOINTMENT (OUTPATIENT)
Dept: HEMATOLOGY ONCOLOGY | Facility: CLINIC | Age: 43
End: 2018-07-16
Payer: MEDICAID

## 2018-07-16 VITALS
SYSTOLIC BLOOD PRESSURE: 128 MMHG | RESPIRATION RATE: 16 BRPM | BODY MASS INDEX: 27.42 KG/M2 | WEIGHT: 180.34 LBS | DIASTOLIC BLOOD PRESSURE: 80 MMHG | HEART RATE: 70 BPM | TEMPERATURE: 98 F | OXYGEN SATURATION: 100 %

## 2018-07-16 DIAGNOSIS — C46.9 KAPOSI'S SARCOMA, UNSPECIFIED: ICD-10-CM

## 2018-07-16 DIAGNOSIS — F41.9 ANXIETY DISORDER, UNSPECIFIED: ICD-10-CM

## 2018-07-16 DIAGNOSIS — B19.10 UNSPECIFIED VIRAL HEPATITIS B WITHOUT HEPATIC COMA: ICD-10-CM

## 2018-07-16 DIAGNOSIS — L03.90 CELLULITIS, UNSPECIFIED: ICD-10-CM

## 2018-07-16 PROCEDURE — 99214 OFFICE O/P EST MOD 30 MIN: CPT

## 2018-07-24 ENCOUNTER — TRANSCRIPTION ENCOUNTER (OUTPATIENT)
Age: 43
End: 2018-07-24

## 2018-07-24 ENCOUNTER — MEDICATION RENEWAL (OUTPATIENT)
Age: 43
End: 2018-07-24

## 2018-07-26 ENCOUNTER — MEDICATION RENEWAL (OUTPATIENT)
Age: 43
End: 2018-07-26

## 2018-08-02 RX ORDER — DOLUTEGRAVIR SODIUM 50 MG/1
50 TABLET, FILM COATED ORAL
Qty: 30 | Refills: 2 | Status: COMPLETED | COMMUNITY
Start: 2017-01-18 | End: 2018-07-10

## 2018-08-05 ENCOUNTER — RX RENEWAL (OUTPATIENT)
Age: 43
End: 2018-08-05

## 2018-08-05 ENCOUNTER — TRANSCRIPTION ENCOUNTER (OUTPATIENT)
Age: 43
End: 2018-08-05

## 2018-08-06 ENCOUNTER — RX RENEWAL (OUTPATIENT)
Age: 43
End: 2018-08-06

## 2018-08-06 ENCOUNTER — TRANSCRIPTION ENCOUNTER (OUTPATIENT)
Age: 43
End: 2018-08-06

## 2018-08-09 NOTE — H&P ADULT - RS GEN PE MLT RESP DETAILS PC
----- Message from Eliecer Golden MD sent at 8/9/2018  7:36 AM CDT -----  Labs all normal  
Patient's wife notified of lab results.  
no rales/no wheezes/breath sounds equal/no rhonchi

## 2018-08-10 ENCOUNTER — TRANSCRIPTION ENCOUNTER (OUTPATIENT)
Age: 43
End: 2018-08-10

## 2018-09-04 ENCOUNTER — MEDICATION RENEWAL (OUTPATIENT)
Age: 43
End: 2018-09-04

## 2018-09-04 ENCOUNTER — TRANSCRIPTION ENCOUNTER (OUTPATIENT)
Age: 43
End: 2018-09-04

## 2018-09-10 ENCOUNTER — TRANSCRIPTION ENCOUNTER (OUTPATIENT)
Age: 43
End: 2018-09-10

## 2018-09-13 ENCOUNTER — TRANSCRIPTION ENCOUNTER (OUTPATIENT)
Age: 43
End: 2018-09-13

## 2018-10-15 ENCOUNTER — TRANSCRIPTION ENCOUNTER (OUTPATIENT)
Age: 43
End: 2018-10-15

## 2018-10-22 ENCOUNTER — RX RENEWAL (OUTPATIENT)
Age: 43
End: 2018-10-22

## 2018-11-01 ENCOUNTER — APPOINTMENT (OUTPATIENT)
Dept: INFECTIOUS DISEASE | Facility: CLINIC | Age: 43
End: 2018-11-01
Payer: MEDICAID

## 2018-11-01 ENCOUNTER — OUTPATIENT (OUTPATIENT)
Dept: OUTPATIENT SERVICES | Facility: HOSPITAL | Age: 43
LOS: 1 days | End: 2018-11-01
Payer: MEDICAID

## 2018-11-01 VITALS
DIASTOLIC BLOOD PRESSURE: 89 MMHG | SYSTOLIC BLOOD PRESSURE: 125 MMHG | HEIGHT: 68 IN | WEIGHT: 188 LBS | BODY MASS INDEX: 28.49 KG/M2 | TEMPERATURE: 97.2 F | OXYGEN SATURATION: 100 % | HEART RATE: 72 BPM

## 2018-11-01 DIAGNOSIS — B20 HUMAN IMMUNODEFICIENCY VIRUS [HIV] DISEASE: ICD-10-CM

## 2018-11-01 PROCEDURE — G0463: CPT

## 2018-11-01 PROCEDURE — 99214 OFFICE O/P EST MOD 30 MIN: CPT

## 2018-11-01 PROCEDURE — 99213 OFFICE O/P EST LOW 20 MIN: CPT

## 2018-11-01 RX ORDER — UREA 20 %
20 CREAM (GRAM) TOPICAL TWICE DAILY
Qty: 2 | Refills: 5 | Status: COMPLETED | COMMUNITY
Start: 2017-11-06 | End: 2018-11-01

## 2018-11-01 RX ORDER — TRIAMCINOLONE ACETONIDE 1 MG/G
0.1 CREAM TOPICAL
Qty: 1 | Refills: 5 | Status: COMPLETED | COMMUNITY
Start: 2017-11-06 | End: 2018-11-01

## 2018-11-01 RX ORDER — FLUCONAZOLE 100 MG/1
100 TABLET ORAL
Qty: 30 | Refills: 3 | Status: COMPLETED | COMMUNITY
Start: 2017-12-13 | End: 2018-11-01

## 2018-11-02 ENCOUNTER — OTHER (OUTPATIENT)
Age: 43
End: 2018-11-02

## 2018-11-02 LAB
25(OH)D3 SERPL-MCNC: 25.7 NG/ML
ALBUMIN SERPL ELPH-MCNC: 3.8 G/DL
ALP BLD-CCNC: 57 U/L
ALT SERPL-CCNC: 36 U/L
ANION GAP SERPL CALC-SCNC: 10 MMOL/L
AST SERPL-CCNC: 35 U/L
BASOPHILS # BLD AUTO: 0.03 K/UL
BASOPHILS NFR BLD AUTO: 0.6 %
BILIRUB SERPL-MCNC: 0.8 MG/DL
BUN SERPL-MCNC: 10 MG/DL
CALCIUM SERPL-MCNC: 9.1 MG/DL
CD3 CELLS # BLD: 1138 /UL
CD3 CELLS NFR BLD: 82 %
CD3+CD4+ CELLS # BLD: 197 /UL
CD3+CD4+ CELLS NFR BLD: 14 %
CD3+CD4+ CELLS/CD3+CD8+ CLL SPEC: 0.22 RATIO
CD3+CD8+ CELLS # SPEC: 913 /UL
CD3+CD8+ CELLS NFR BLD: 66 %
CHLORIDE SERPL-SCNC: 103 MMOL/L
CO2 SERPL-SCNC: 28 MMOL/L
CREAT SERPL-MCNC: 1.06 MG/DL
CYSTATIN C SERPL-MCNC: 0.93 MG/L
EOSINOPHIL # BLD AUTO: 0.33 K/UL
EOSINOPHIL NFR BLD AUTO: 6.5 %
GFR/BSA.PRED SERPLBLD CYS-BASED-ARV: 91 ML/MIN
GLUCOSE SERPL-MCNC: 80 MG/DL
HCT VFR BLD CALC: 46.2 %
HGB BLD-MCNC: 15.4 G/DL
HIV1 RNA # SERPL NAA+PROBE: ABNORMAL
HIV1 RNA # SERPL NAA+PROBE: ABNORMAL COPIES/ML
IMM GRANULOCYTES NFR BLD AUTO: 0.2 %
LYMPHOCYTES # BLD AUTO: 1.37 K/UL
LYMPHOCYTES NFR BLD AUTO: 26.9 %
MAN DIFF?: NORMAL
MCHC RBC-ENTMCNC: 29.6 PG
MCHC RBC-ENTMCNC: 33.3 GM/DL
MCV RBC AUTO: 88.7 FL
MONOCYTES # BLD AUTO: 0.71 K/UL
MONOCYTES NFR BLD AUTO: 13.9 %
NEUTROPHILS # BLD AUTO: 2.64 K/UL
NEUTROPHILS NFR BLD AUTO: 51.9 %
PLATELET # BLD AUTO: 197 K/UL
POTASSIUM SERPL-SCNC: 5.1 MMOL/L
PROT SERPL-MCNC: 7.7 G/DL
RBC # BLD: 5.21 M/UL
RBC # FLD: 14.4 %
SODIUM SERPL-SCNC: 141 MMOL/L
VIRAL LOAD INTERP: NORMAL
VIRAL LOAD LOG: ABNORMAL LG COP/ML
WBC # FLD AUTO: 5.09 K/UL

## 2018-11-04 LAB
M TB IFN-G BLD-IMP: NEGATIVE
QUANTIFERON TB PLUS MITOGEN MINUS NIL: 7.56 IU/ML
QUANTIFERON TB PLUS NIL: 0.04 IU/ML
QUANTIFERON TB PLUS TB1 MINUS NIL: -0.01 IU/ML
QUANTIFERON TB PLUS TB2 MINUS NIL: -0.02 IU/ML

## 2018-11-05 DIAGNOSIS — Q82.1 XERODERMA PIGMENTOSUM: ICD-10-CM

## 2018-11-05 DIAGNOSIS — F33.1 MAJOR DEPRESSIVE DISORDER, RECURRENT, MODERATE: ICD-10-CM

## 2018-11-26 ENCOUNTER — RX RENEWAL (OUTPATIENT)
Age: 43
End: 2018-11-26

## 2018-11-26 ENCOUNTER — OUTPATIENT (OUTPATIENT)
Dept: OUTPATIENT SERVICES | Facility: HOSPITAL | Age: 43
LOS: 1 days | Discharge: HOME | End: 2018-11-26

## 2018-11-26 DIAGNOSIS — C46.0 KAPOSI'S SARCOMA OF SKIN: ICD-10-CM

## 2018-11-27 ENCOUNTER — OUTPATIENT (OUTPATIENT)
Dept: OUTPATIENT SERVICES | Facility: HOSPITAL | Age: 43
LOS: 1 days | End: 2018-11-27
Payer: MEDICAID

## 2018-11-27 ENCOUNTER — APPOINTMENT (OUTPATIENT)
Dept: INFECTIOUS DISEASE | Facility: CLINIC | Age: 43
End: 2018-11-27
Payer: MEDICAID

## 2018-11-27 ENCOUNTER — RX RENEWAL (OUTPATIENT)
Age: 43
End: 2018-11-27

## 2018-11-27 DIAGNOSIS — B20 HUMAN IMMUNODEFICIENCY VIRUS [HIV] DISEASE: ICD-10-CM

## 2018-11-27 PROCEDURE — 99213 OFFICE O/P EST LOW 20 MIN: CPT

## 2018-11-27 PROCEDURE — G0463: CPT

## 2018-11-29 ENCOUNTER — TRANSCRIPTION ENCOUNTER (OUTPATIENT)
Age: 43
End: 2018-11-29

## 2018-12-03 ENCOUNTER — APPOINTMENT (OUTPATIENT)
Dept: HEMATOLOGY ONCOLOGY | Facility: CLINIC | Age: 43
End: 2018-12-03
Payer: MEDICAID

## 2018-12-03 VITALS
SYSTOLIC BLOOD PRESSURE: 123 MMHG | HEART RATE: 71 BPM | DIASTOLIC BLOOD PRESSURE: 76 MMHG | TEMPERATURE: 97.5 F | WEIGHT: 187.99 LBS | RESPIRATION RATE: 16 BRPM | BODY MASS INDEX: 28.59 KG/M2 | OXYGEN SATURATION: 97 %
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
## 2018-12-03 DIAGNOSIS — G89.3 NEOPLASM RELATED PAIN (ACUTE) (CHRONIC): ICD-10-CM

## 2018-12-03 DIAGNOSIS — F33.1 MAJOR DEPRESSIVE DISORDER, RECURRENT, MODERATE: ICD-10-CM

## 2018-12-03 PROCEDURE — 99215 OFFICE O/P EST HI 40 MIN: CPT

## 2018-12-03 RX ORDER — PROCHLORPERAZINE MALEATE 10 MG/1
10 TABLET ORAL EVERY 6 HOURS
Qty: 1 | Refills: 0 | Status: DISCONTINUED | COMMUNITY
Start: 2018-04-11 | End: 2018-12-03

## 2018-12-03 RX ORDER — ZOLPIDEM TARTRATE 10 MG/1
10 TABLET ORAL
Qty: 30 | Refills: 0 | Status: DISCONTINUED | COMMUNITY
Start: 2018-08-06 | End: 2018-12-03

## 2018-12-03 NOTE — PHYSICAL EXAM
[Restricted in physically strenuous activity but ambulatory and able to carry out work of a light or sedentary nature] : Status 1- Restricted in physically strenuous activity but ambulatory and able to carry out work of a light or sedentary nature, e.g., light house work, office work [Normal] : affect appropriate [de-identified] : 1+ edema to mid thigh on right; limited ROM from pain right foot, +1 L lower leg edema, no erythema noted. (+) xerosis over same area [de-identified] : R leg, Lower Extremity exam appears to have  some brown globules, and erythema noted same for on sole of foot and above treated area of lower extremity. He has much less xerosis, corrugated aspect of the calf and shin after using urea and glucocorticoid creams

## 2018-12-03 NOTE — CONSULT LETTER
[Dear  ___] : Dear ~MERCEDEZ, [Courtesy Letter:] : I had the pleasure of seeing your patient, [unfilled], in my office today. [Please see my note below.] : Please see my note below. [Consult Closing:] : Thank you very much for allowing me to participate in the care of this patient.  If you have any questions, please do not hesitate to contact me. [Sincerely,] : Sincerely, [DrCharley  ___] : Dr. BARRERA [FreeTextEntry2] : Tobin Perez MD, Infectious Disease [FreeTextEntry1] : He is doing well on imaging and as well as can be expected on exam, given the lymphedema that remains after Rx of his KS. We will see him again in about 6 months and continue medication refills as needed for a variety of issues.  [FreeTextEntry3] : Esequiel Pacheco MD PhD FACP\par , [facility]\par Medical Director, [facility]\par Lead, Sarcoma Program\par Professor, [facility] [employer] at [facility] and \par [facility]\par Office: [address] [place] [address]\par TEL  [phone]\par  [phone]\par jazmín pacheco md @ [email or web address]

## 2018-12-03 NOTE — HISTORY OF PRESENT ILLNESS
[Disease: _____________________] : Disease: [unfilled] [AJCC Stage: ____] : AJCC Stage: [unfilled] [Cycle: ___] : Cycle: [unfilled] [de-identified] : Nolan is a 43 M seen with life threatening presentation in 10/2016, of AIDS and complicating infections. His KS has come to the fore in the weeks after his diagnosis and treatment affecting RLE, LLE, lungs at a minimum. He received one cycle of PLD/Doxil in hospital with improvement, and received 6 cycles with some improvement in disease. He has been stable off two courses of PLD/Doxil Rx save for recurrent cellulitis. \par \par He is seen for advice, opinion on further management. \par \par HPI:\par \par 2016: 10-15 lb weight loss, night sweats, dysphagia. Notes he recalls little or nothing of the last two weeks prior to hospitalization, being taken by water ambulance from [place] to hospital \par \par 10/5/2016-10/16/2016: Admit [facility] with respiratory failure, initial diagnosis of AIDS. CF4 = 4, CF4/8 = 0/12, HIV RNA VL = 70869. Recalls tested negative in , and states was negative by Oraquick test in 2016. Admitted with WBC 1.2, Hb 11.6, , Na 123, Creat 1.33, ABG 7.52/28/56, with CXR with opacity in RLL. Rx abx, Rx Bipap and care in MICU. LEFT and RIGHT  leg swelling noted, U/S negative for clot. CT angio showed no PE. Multifocal pneumonia noted on CT. Found with enterovirus, rhinovirus, RSV positive, and HIV positive. Cx showed S. pneumonia. AFB negative in sputa to date. Hep B also found positive, viral load 98 IU/mL.  U/S showed gallstones but no cholecystitis. Also positive for oropharyngeal thrus.  Found also with severe protein chen malnutrition, suggested supplements.  \par \par 10/22/2016: Readmit with increasing RIGHT lower extremity pain and swelling. Multifocal lesions noted worsening over shin > posteriorly. RIGHT foot with substantial edema causing continuous unbearable pain.  \par \par 10/31/2016: Eventually Bx skin lesions showing KS. Rx x 1 PLD/Doxil before DC to rehab, which per patient and mom's report had been an utter disaster in terms of transition of care with medications and diagnoses incorrect at multiple junctures during stay. \par \par 16 : Reports R lower extremity swelling is much improved.  R lower extremity feels better, no open lesions. No other lesions noted. He is improving in terms of activity and now at home after being DC'd from rehab.\par \par 16 : Cycle 3 D 1 of PLD Dox. Reports felling well and his R leg is much improved after his 2nd cycle. Today his WBC is 2.7 and ANC of 300. He reports that he had an episode of fever on 16, 101F but did not report. He has not had any fevers since then and does not have chills, malaise or other S/S of infection. \par PLD/DOX held for 2 weeks for recovery.\par \par : Was seen by Dr. Alonzo at ID clinic for AIDS and Hep B. DDI noted with PLD/DOX and Tenofovir (CY inhibition) in [facility]. Tenofovir being changed to Descovy and Tivicay in order to avoid drug-interactions. This may have prolonged neutropenia and PLD/Dox to stay longer. His PLD/DOX is dose reduced to 35 mg/m2 today as well. Today's CBC+diff is pending. \par \par 17: Cycle 4 today.  Cycle 3 dose reduced to 35 mg/m2 secondary to NTP and HIV medication was changed. Abscess under R axilla went to urgent center. Was was on clindamycin for 7 days(11). Now completely resolved.   He would like to go to a "salt steam house" for relaxation and breathing. \par \par 3/16/17: Here for cycle 5 of PLDox. He had another abscess under his right arm, completed clindamycin x 7 days. Continues to be followed closely with Dr. Alonzo(ID) for HIV. \par Weight gain on therapy, otherwise feels well. \par \par 17: Nolan comes in for cycle 6 of therapy. He had difficult time with his last cycle fatigue which lasted for about 4 days. He is fully recovered and feels well. He has gained a considerable amount of weight since starting therapy. Now moved back to [place] and walked 8 miles yesterday. No SOB, JULIEN, pain. \par \par 17 : Nolan is here for a follow up. He had CT chest on 17 showing significant improvement.  He reports R leg swelling worsening over a week and there is a small cut in the anterior portion 3 mm, oozing serous fluid and contralateral leg edema for 2 weeks with some read patches along the anterior portion of the shin, tender to palpate. No identifiable injury on the skin.  Saw Dr. Silva for follow up this morning. Currently on Descovy and Tivicay for HIV.\par \par 17 : Nolan comes  in today for a follow up. Completed 6 cycles of PLD/Doxil in  and is back in hospitals. Has had erythematous bilateral LE with edema on his last visit, we decided to monitor rather than biopsy and additional tests.   \par \par 10/6/17: Here for a follow up. He had acute hep A with elevated LFTs in September. He had fatigue at that time and refused to go to the ED as he was on [place]. His blood work from ID showed elevated LFTs.  CT chest today - results not on  as of 12 noon. He also notes pain in the sole of the foot and increased edema causing pain.\par \par 10/31/17: Nolan's LEFT leg and foot erythema and edema has improved only slightly since our last visit with ABX and now here for further discussion regarding management of KS complicating AIDS, with substantial symptoms from the damage the tumor has done to the soft tissue of the right > left lower extremity. CT chest showed stable changes with small pulmonary nodules and hypertension. Taking tramadol for pain. Pain in rated 3 on the tramadol. \par \par 2018:  Recent erythema right LE again. This dissipated within a few days. There was no warmth or erythema to suggest a cellulitis.  He notes increased xerosis and bumpiness to the skin and is out of triamcinolone and urea creams. He noted "PMS like symptoms" a day or two after chemotherapy, alleviated with acupuncture but likely related to pre treatment dexamethasone. \par \par 18 : Here for a follow up and for cycle 10 of PLD/Dox. He is due to go back to [place] to the summer after this Rx. He had substantial agitation at his ID visit, and was referred to a psychiatrist, who Rx clonazepam 0.5 bid with good effect. He had heartburn and agitation after last cycle of Rx, ? related to dexamethasone. He has continued leg pain and has heard of cannabis oil for the leg for pain. 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 His leg looks better than it has since diagnosis, however. He still has occasional pain and has skin issues such as cellulitis from time to time.  \par \par 12/3/2018: RTC feeling well, had tick bite this summer but no cellulitis. Still with anxiety disorder but finds present Rx with clonazepam and zolpidem helping overall. Occ respiratory issues with cold weather coming on, and also has some muscle fatigue / cramping in legs after working at the end of a day. \par \par He is now seen for advice, opinion on further management [de-identified] : By far most disease over right shin area, circumferentially around the lower calf. only scattered lesions elsewhere. About 20 cm (craniocaudal) x 10 cm of confluent tumor [FreeTextEntry1] : PLD/DOX every 28 days x 6 cycles, last cycle 4/20/17 before resuming with a new course later in 2017; now on observation only since spring 2018 [de-identified] : See above

## 2018-12-03 NOTE — REVIEW OF SYSTEMS
[Anxiety] : anxiety [Negative] : Allergic/Immunologic [Night Sweats] : no night sweats [FreeTextEntry2] : Working full time with limitations secondary to the pain and swelling of both legs.  [FreeTextEntry6] : Bad hiccups after dexamethasone Rx [de-identified] : RLE still discolored but better moisturized and more supple (uses baby oil), few active lesions on sole of foot and closer to knee on right lower extremity. Cellulitis resolved

## 2018-12-03 NOTE — ASSESSMENT
[Supportive] : Goals of care discussed with patient: Supportive [Palliative Care Plan] : not applicable at this time [FreeTextEntry1] : Imp/Plan: Complex 43 M with AIDS and KS as principal complicating issue. He is doing very well 2 years + since diagnosis. \par \par Responding to PLD/DOX however  after cycle 2 and cycle 3 delayed x 2 weeks; this was due to an interaction with his HIV medication. Completed 6 cycles. Worse clinically so he restarted late 2017 and finished another 6 cycles.  He had changed his ART so does not need pegfilgrastim to help boost blood counts. \par \par --KS ; No disease worsening overall. Imaging of chest shows improvement overall, 11/2018. \par \par Hold on further RX, RTC 6 mo in follow up - sometime in early May 2019, no scans, just clinical follow up since he gets labs through his ID MD. \par \par -- Consider pazopanib vs lenalidomide vs trial if disease worse.\par \par --ARV had been changed from changed from Genvoya to Descovy and Tivicay and now to new combination. \par \par AIDS/Hep B: Continue close follow up with Dr Rouse in I.D.\par \par Pain: continue Tramadol as needed for the time being. Rx not renewed at this visit but he sends portal messages for rx renewal\par \par Seen by MD only today\par \par JORGE Durbin MD PhD \par

## 2018-12-04 ENCOUNTER — RX RENEWAL (OUTPATIENT)
Age: 43
End: 2018-12-04

## 2018-12-05 DIAGNOSIS — F33.1 MAJOR DEPRESSIVE DISORDER, RECURRENT, MODERATE: ICD-10-CM

## 2018-12-24 ENCOUNTER — RX RENEWAL (OUTPATIENT)
Age: 43
End: 2018-12-24

## 2018-12-27 ENCOUNTER — OUTPATIENT (OUTPATIENT)
Dept: OUTPATIENT SERVICES | Facility: HOSPITAL | Age: 43
LOS: 1 days | End: 2018-12-27
Payer: MEDICAID

## 2018-12-27 ENCOUNTER — APPOINTMENT (OUTPATIENT)
Dept: INFECTIOUS DISEASE | Facility: CLINIC | Age: 43
End: 2018-12-27
Payer: MEDICAID

## 2018-12-27 DIAGNOSIS — B20 HUMAN IMMUNODEFICIENCY VIRUS [HIV] DISEASE: ICD-10-CM

## 2018-12-27 PROCEDURE — 99213 OFFICE O/P EST LOW 20 MIN: CPT

## 2018-12-27 PROCEDURE — G0463: CPT

## 2018-12-28 DIAGNOSIS — F33.41 MAJOR DEPRESSIVE DISORDER, RECURRENT, IN PARTIAL REMISSION: ICD-10-CM

## 2019-01-22 ENCOUNTER — RX RENEWAL (OUTPATIENT)
Age: 44
End: 2019-01-22

## 2019-01-24 ENCOUNTER — TRANSCRIPTION ENCOUNTER (OUTPATIENT)
Age: 44
End: 2019-01-24

## 2019-02-20 ENCOUNTER — TRANSCRIPTION ENCOUNTER (OUTPATIENT)
Age: 44
End: 2019-02-20

## 2019-02-20 ENCOUNTER — MESSAGE (OUTPATIENT)
Age: 44
End: 2019-02-20

## 2019-02-21 ENCOUNTER — TRANSCRIPTION ENCOUNTER (OUTPATIENT)
Age: 44
End: 2019-02-21

## 2019-02-25 ENCOUNTER — LABORATORY RESULT (OUTPATIENT)
Age: 44
End: 2019-02-25

## 2019-02-26 ENCOUNTER — OUTPATIENT (OUTPATIENT)
Dept: OUTPATIENT SERVICES | Facility: HOSPITAL | Age: 44
LOS: 1 days | End: 2019-02-26
Payer: MEDICAID

## 2019-02-26 ENCOUNTER — APPOINTMENT (OUTPATIENT)
Dept: INFECTIOUS DISEASE | Facility: CLINIC | Age: 44
End: 2019-02-26
Payer: MEDICAID

## 2019-02-26 ENCOUNTER — APPOINTMENT (OUTPATIENT)
Dept: INFECTIOUS DISEASE | Facility: CLINIC | Age: 44
End: 2019-02-26

## 2019-02-26 VITALS
SYSTOLIC BLOOD PRESSURE: 137 MMHG | BODY MASS INDEX: 32.23 KG/M2 | TEMPERATURE: 96.7 F | HEART RATE: 81 BPM | DIASTOLIC BLOOD PRESSURE: 93 MMHG | OXYGEN SATURATION: 95 % | WEIGHT: 212 LBS

## 2019-02-26 DIAGNOSIS — N52.9 MALE ERECTILE DYSFUNCTION, UNSPECIFIED: ICD-10-CM

## 2019-02-26 DIAGNOSIS — B20 HUMAN IMMUNODEFICIENCY VIRUS [HIV] DISEASE: ICD-10-CM

## 2019-02-26 PROCEDURE — 80053 COMPREHEN METABOLIC PANEL: CPT

## 2019-02-26 PROCEDURE — 86360 T CELL ABSOLUTE COUNT/RATIO: CPT

## 2019-02-26 PROCEDURE — 84156 ASSAY OF PROTEIN URINE: CPT

## 2019-02-26 PROCEDURE — 82570 ASSAY OF URINE CREATININE: CPT

## 2019-02-26 PROCEDURE — 87491 CHLMYD TRACH DNA AMP PROBE: CPT

## 2019-02-26 PROCEDURE — 84403 ASSAY OF TOTAL TESTOSTERONE: CPT

## 2019-02-26 PROCEDURE — 86480 TB TEST CELL IMMUN MEASURE: CPT

## 2019-02-26 PROCEDURE — 87591 N.GONORRHOEAE DNA AMP PROB: CPT

## 2019-02-26 PROCEDURE — G0009: CPT

## 2019-02-26 PROCEDURE — 83036 HEMOGLOBIN GLYCOSYLATED A1C: CPT

## 2019-02-26 PROCEDURE — 82306 VITAMIN D 25 HYDROXY: CPT

## 2019-02-26 PROCEDURE — 86593 SYPHILIS TEST NON-TREP QUANT: CPT

## 2019-02-26 PROCEDURE — 81001 URINALYSIS AUTO W/SCOPE: CPT

## 2019-02-26 PROCEDURE — G0463: CPT | Mod: 25

## 2019-02-26 PROCEDURE — 86780 TREPONEMA PALLIDUM: CPT

## 2019-02-26 PROCEDURE — 84443 ASSAY THYROID STIM HORMONE: CPT

## 2019-02-26 PROCEDURE — 86592 SYPHILIS TEST NON-TREP QUAL: CPT

## 2019-02-26 PROCEDURE — 82533 TOTAL CORTISOL: CPT

## 2019-02-26 PROCEDURE — 80061 LIPID PANEL: CPT

## 2019-02-26 PROCEDURE — 90732 PPSV23 VACC 2 YRS+ SUBQ/IM: CPT

## 2019-02-26 PROCEDURE — 84402 ASSAY OF FREE TESTOSTERONE: CPT

## 2019-02-26 PROCEDURE — 90834 PSYTX W PT 45 MINUTES: CPT

## 2019-02-26 PROCEDURE — 82164 ANGIOTENSIN I ENZYME TEST: CPT

## 2019-02-26 PROCEDURE — 86803 HEPATITIS C AB TEST: CPT

## 2019-02-26 PROCEDURE — 87536 HIV-1 QUANT&REVRSE TRNSCRPJ: CPT

## 2019-02-26 PROCEDURE — 99214 OFFICE O/P EST MOD 30 MIN: CPT

## 2019-02-26 PROCEDURE — 85027 COMPLETE CBC AUTOMATED: CPT

## 2019-02-26 NOTE — ASSESSMENT
[FreeTextEntry1] : HIV suppressed on Biktarvy\par Continue current meds\par Weight gain, has had normal HgA1c, check again\par Cortisol PM level after steroid use, off 1 year\par Check thyroid and testosterone level\par Pneumovax\par Has JULIEN and SOB on exposure to cold air, check ACE\par On/Off swelling - Dr Nelson Woodard- vascular surgery- likely Post-KS effect\par Cialis for ED\par Defers flu shot\par Check non-fasted lipids, HgA1c, Vitamin D

## 2019-02-26 NOTE — PHYSICAL EXAM
[General Appearance - Alert] : alert [General Appearance - In No Acute Distress] : in no acute distress [Sclera] : the sclera and conjunctiva were normal [PERRL With Normal Accommodation] : pupils were equal in size, round, reactive to light [Extraocular Movements] : extraocular movements were intact [Outer Ear] : the ears and nose were normal in appearance [Oropharynx] : the oropharynx was normal with no thrush [Neck Appearance] : the appearance of the neck was normal [Neck Cervical Mass (___cm)] : no neck mass was observed [Jugular Venous Distention Increased] : there was no jugular-venous distention [Thyroid Diffuse Enlargement] : the thyroid was not enlarged [Auscultation Breath Sounds / Voice Sounds] : lungs were clear to auscultation bilaterally [Heart Rate And Rhythm] : heart rate was normal and rhythm regular [Heart Sounds] : normal S1 and S2 [Heart Sounds Gallop] : no gallops [Murmurs] : no murmurs [Heart Sounds Pericardial Friction Rub] : no pericardial rub [Full Pulse] : the pedal pulses are present [Edema] : there was no peripheral edema [Bowel Sounds] : normal bowel sounds [Abdomen Soft] : soft [Abdomen Tenderness] : non-tender [Abdomen Mass (___ Cm)] : no abdominal mass palpated [Costovertebral Angle Tenderness] : no CVA tenderness [No Palpable Adenopathy] : no palpable adenopathy [Musculoskeletal - Swelling] : no joint swelling [Nail Clubbing] : no clubbing  or cyanosis of the fingernails [Motor Tone] : muscle strength and tone were normal [Skin Color & Pigmentation] : normal skin color and pigmentation [] : no rash [Deep Tendon Reflexes (DTR)] : deep tendon reflexes were 2+ and symmetric [Sensation] : the sensory exam was normal to light touch and pinprick [No Focal Deficits] : no focal deficits [Oriented To Time, Place, And Person] : oriented to person, place, and time [Affect] : the affect was normal [FreeTextEntry1] : Non-pitting edema B/L legs

## 2019-02-26 NOTE — HISTORY OF PRESENT ILLNESS
[Sexually Active] : The patient is sexually active [Monogamous] : monogamous [Men] : with men [Male ___] : [unfilled] male [FreeTextEntry1] : HIV controlled on ARV:  On Biktarvy\par He is concerned about weight gain. Drinks Diet soda\par Mother cooks and she is on Weight Watchers diet. He is trying to folow calorie count.\par \par He has gained weight on and off, now up considerably. relates to use of steroids during chemo.\par \par Has some SOB with exertion, and with exposure to cold air, no wheezing. No smoking- not since Oct 2016. \par \par Swelling in right ankle, comes and goes. Even occurs after he lies down.  Swelling comes and goes. Never completely goes. \par \par ED had relief with Cialis from a friend\par \par o go to [place] next week

## 2019-02-27 ENCOUNTER — TRANSCRIPTION ENCOUNTER (OUTPATIENT)
Age: 44
End: 2019-02-27

## 2019-02-27 LAB
4/8 RATIO: 0.22 RATIO — LOW (ref 0.9–3.6)
ABS CD8: 738 /UL — SIGNIFICANT CHANGE UP (ref 142–740)
ALBUMIN SERPL ELPH-MCNC: 3.8 G/DL — SIGNIFICANT CHANGE UP (ref 3.3–5)
ALP SERPL-CCNC: 56 U/L — SIGNIFICANT CHANGE UP (ref 40–120)
ALT FLD-CCNC: 40 U/L — SIGNIFICANT CHANGE UP (ref 10–45)
ANION GAP SERPL CALC-SCNC: 11 MMOL/L — SIGNIFICANT CHANGE UP (ref 5–17)
APPEARANCE UR: CLEAR — SIGNIFICANT CHANGE UP
AST SERPL-CCNC: 35 U/L — SIGNIFICANT CHANGE UP (ref 10–40)
BACTERIA # UR AUTO: NEGATIVE — SIGNIFICANT CHANGE UP
BILIRUB SERPL-MCNC: 1.2 MG/DL — SIGNIFICANT CHANGE UP (ref 0.2–1.2)
BILIRUB UR-MCNC: NEGATIVE — SIGNIFICANT CHANGE UP
BUN SERPL-MCNC: 13 MG/DL — SIGNIFICANT CHANGE UP (ref 7–23)
C TRACH RRNA SPEC QL NAA+PROBE: SIGNIFICANT CHANGE UP
C TRACH+GC RRNA ANAL QL PROBE: SIGNIFICANT CHANGE UP
C TRACH+GC RRNA SPEC QL PROBE: SIGNIFICANT CHANGE UP
CALCIUM SERPL-MCNC: 8.9 MG/DL — SIGNIFICANT CHANGE UP (ref 8.4–10.5)
CD3 BLASTS SPEC-ACNC: 79 % — SIGNIFICANT CHANGE UP (ref 59–83)
CD3 BLASTS SPEC-ACNC: 907 /UL — SIGNIFICANT CHANGE UP (ref 672–1870)
CD4 %: 14 % — LOW (ref 30–62)
CD8 %: 64 % — HIGH (ref 12–36)
CHLAMYDIA/N. GONORRHEA, ANAL/RECTAL, TMA - SOURCE ANAL: SIGNIFICANT CHANGE UP
CHLAMYDIA/N. GONORRHEA, ORAL/THROAT, TMA - SOURCE ORAL: SIGNIFICANT CHANGE UP
CHLORIDE SERPL-SCNC: 102 MMOL/L — SIGNIFICANT CHANGE UP (ref 96–108)
CHOLEST SERPL-MCNC: 166 MG/DL — SIGNIFICANT CHANGE UP (ref 10–199)
CO2 SERPL-SCNC: 25 MMOL/L — SIGNIFICANT CHANGE UP (ref 22–31)
COLOR SPEC: YELLOW — SIGNIFICANT CHANGE UP
CORTIS F PM SERPL-MCNC: 7.6 UG/DL — SIGNIFICANT CHANGE UP (ref 2.7–10.5)
CREAT ?TM UR-MCNC: 67 MG/DL — SIGNIFICANT CHANGE UP
CREAT SERPL-MCNC: 1.2 MG/DL — SIGNIFICANT CHANGE UP (ref 0.5–1.3)
DIFF PNL FLD: NEGATIVE — SIGNIFICANT CHANGE UP
EPI CELLS # UR: 0 /HPF — SIGNIFICANT CHANGE UP (ref 0–5)
GLUCOSE SERPL-MCNC: 72 MG/DL — SIGNIFICANT CHANGE UP (ref 70–99)
GLUCOSE UR QL: NEGATIVE — SIGNIFICANT CHANGE UP
HBA1C BLD-MCNC: 5 % — SIGNIFICANT CHANGE UP (ref 4–5.6)
HCT VFR BLD CALC: 48 % — SIGNIFICANT CHANGE UP (ref 39–50)
HCV AB S/CO SERPL IA: 0.19 S/CO — SIGNIFICANT CHANGE UP (ref 0–0.79)
HCV AB SERPL-IMP: SIGNIFICANT CHANGE UP
HDLC SERPL-MCNC: 37 MG/DL — LOW
HGB BLD-MCNC: 15.7 G/DL — SIGNIFICANT CHANGE UP (ref 13–17)
HIV-1 VIRAL LOAD RESULT: ABNORMAL
HIV1 RNA # SERPL NAA+PROBE: 106 — SIGNIFICANT CHANGE UP
HIV1 RNA SER-IMP: SIGNIFICANT CHANGE UP
HIV1 RNA SERPL NAA+PROBE-ACNC: ABNORMAL
HIV1 RNA SERPL NAA+PROBE-LOG#: 2.02 — SIGNIFICANT CHANGE UP
HYALINE CASTS # UR AUTO: 0 /LPF — SIGNIFICANT CHANGE UP (ref 0–7)
KETONES UR-MCNC: NEGATIVE — SIGNIFICANT CHANGE UP
LEUKOCYTE ESTERASE UR-ACNC: NEGATIVE — SIGNIFICANT CHANGE UP
LIPID PNL WITH DIRECT LDL SERPL: 106 MG/DL — HIGH
MCHC RBC-ENTMCNC: 30.2 PG — SIGNIFICANT CHANGE UP (ref 27–34)
MCHC RBC-ENTMCNC: 32.7 GM/DL — SIGNIFICANT CHANGE UP (ref 32–36)
MCV RBC AUTO: 92.3 FL — SIGNIFICANT CHANGE UP (ref 80–100)
N GONORRHOEA RRNA SPEC QL NAA+PROBE: SIGNIFICANT CHANGE UP
NITRITE UR-MCNC: NEGATIVE — SIGNIFICANT CHANGE UP
PH UR: 6 — SIGNIFICANT CHANGE UP (ref 5–8)
PLATELET # BLD AUTO: 153 K/UL — SIGNIFICANT CHANGE UP (ref 150–400)
POTASSIUM SERPL-MCNC: 4.6 MMOL/L — SIGNIFICANT CHANGE UP (ref 3.5–5.3)
POTASSIUM SERPL-SCNC: 4.6 MMOL/L — SIGNIFICANT CHANGE UP (ref 3.5–5.3)
PROT ?TM UR-MCNC: 7 MG/DL — SIGNIFICANT CHANGE UP (ref 0–12)
PROT SERPL-MCNC: 7.9 G/DL — SIGNIFICANT CHANGE UP (ref 6–8.3)
PROT UR-MCNC: NEGATIVE — SIGNIFICANT CHANGE UP
PROT/CREAT UR-RTO: 0.1 RATIO — SIGNIFICANT CHANGE UP (ref 0–0.2)
RBC # BLD: 5.2 M/UL — SIGNIFICANT CHANGE UP (ref 4.2–5.8)
RBC # FLD: 13.5 % — SIGNIFICANT CHANGE UP (ref 10.3–14.5)
RBC CASTS # UR COMP ASSIST: 1 /HPF — SIGNIFICANT CHANGE UP (ref 0–4)
SODIUM SERPL-SCNC: 138 MMOL/L — SIGNIFICANT CHANGE UP (ref 135–145)
SP GR SPEC: 1.01 — SIGNIFICANT CHANGE UP (ref 1.01–1.02)
SPECIMEN SOURCE: SIGNIFICANT CHANGE UP
T-CELL CD4 SUBSET PNL BLD: 160 /UL — LOW (ref 489–1457)
T4 FREE+ TSH PNL SERPL: 1.8 UIU/ML — SIGNIFICANT CHANGE UP (ref 0.27–4.2)
TOTAL CHOLESTEROL/HDL RATIO MEASUREMENT: 4.5 RATIO — SIGNIFICANT CHANGE UP (ref 3.4–9.6)
TRIGL SERPL-MCNC: 113 MG/DL — SIGNIFICANT CHANGE UP (ref 10–149)
UROBILINOGEN FLD QL: SIGNIFICANT CHANGE UP
WBC # BLD: 4.87 K/UL — SIGNIFICANT CHANGE UP (ref 3.8–10.5)
WBC # FLD AUTO: 4.87 K/UL — SIGNIFICANT CHANGE UP (ref 3.8–10.5)
WBC UR QL: 0 /HPF — SIGNIFICANT CHANGE UP (ref 0–5)

## 2019-02-28 ENCOUNTER — TRANSCRIPTION ENCOUNTER (OUTPATIENT)
Age: 44
End: 2019-02-28

## 2019-02-28 LAB — ACE SERPL-CCNC: 86 U/L — HIGH (ref 14–82)

## 2019-03-01 DIAGNOSIS — I89.0 LYMPHEDEMA, NOT ELSEWHERE CLASSIFIED: ICD-10-CM

## 2019-03-01 DIAGNOSIS — Z23 ENCOUNTER FOR IMMUNIZATION: ICD-10-CM

## 2019-03-01 LAB
24R-OH-CALCIDIOL SERPL-MCNC: 28.9 NG/ML — LOW (ref 30–80)
GAMMA INTERFERON BACKGROUND BLD IA-ACNC: 0.03 IU/ML — SIGNIFICANT CHANGE UP
M TB IFN-G BLD-IMP: NEGATIVE — SIGNIFICANT CHANGE UP
M TB IFN-G CD4+ BCKGRND COR BLD-ACNC: 0 IU/ML — SIGNIFICANT CHANGE UP
M TB IFN-G CD4+CD8+ BCKGRND COR BLD-ACNC: 0 IU/ML — SIGNIFICANT CHANGE UP
QUANT TB PLUS MITOGEN MINUS NIL: 6.11 IU/ML — SIGNIFICANT CHANGE UP
TESTOST FLD-SCNC: 413.4 NG/DL — SIGNIFICANT CHANGE UP (ref 264–916)
TESTOSTERONE.FREE+WB SERPL-MCNC: 4.1 PG/ML — LOW (ref 6.8–21.5)

## 2019-03-04 ENCOUNTER — OUTPATIENT (OUTPATIENT)
Dept: OUTPATIENT SERVICES | Facility: HOSPITAL | Age: 44
LOS: 1 days | End: 2019-03-04

## 2019-03-04 ENCOUNTER — APPOINTMENT (OUTPATIENT)
Dept: INFECTIOUS DISEASE | Facility: CLINIC | Age: 44
End: 2019-03-04

## 2019-03-04 DIAGNOSIS — B20 HUMAN IMMUNODEFICIENCY VIRUS [HIV] DISEASE: ICD-10-CM

## 2019-03-04 DIAGNOSIS — Z23 ENCOUNTER FOR IMMUNIZATION: ICD-10-CM

## 2019-03-04 DIAGNOSIS — I89.0 LYMPHEDEMA, NOT ELSEWHERE CLASSIFIED: ICD-10-CM

## 2019-03-06 LAB
HIV1 RNA # SERPL NAA+PROBE: 89
HIV1 RNA # SERPL NAA+PROBE: ABNORMAL
VIRAL LOAD INTERP: NORMAL
VIRAL LOAD LOG: 1.95

## 2019-03-07 LAB
RPR SER-TITR: SIGNIFICANT CHANGE UP
RPR SERPL-ACNC: SIGNIFICANT CHANGE UP
T PALLIDUM AB TITR SER: POSITIVE
T PALLIDUM IGG SER QL IF: REACTIVE

## 2019-03-15 ENCOUNTER — RESULT REVIEW (OUTPATIENT)
Age: 44
End: 2019-03-15

## 2019-03-18 ENCOUNTER — APPOINTMENT (OUTPATIENT)
Dept: VASCULAR SURGERY | Facility: CLINIC | Age: 44
End: 2019-03-18
Payer: MEDICAID

## 2019-03-18 ENCOUNTER — MEDICATION RENEWAL (OUTPATIENT)
Age: 44
End: 2019-03-18

## 2019-03-18 PROCEDURE — 99203 OFFICE O/P NEW LOW 30 MIN: CPT

## 2019-03-19 ENCOUNTER — RX RENEWAL (OUTPATIENT)
Age: 44
End: 2019-03-19

## 2019-03-19 ENCOUNTER — TRANSCRIPTION ENCOUNTER (OUTPATIENT)
Age: 44
End: 2019-03-19

## 2019-03-19 LAB
HIV GENOSURE ARCHIVE 1: NORMAL
HIV1 PROVIR DNA RT + PR + IN MUT DET SEQ: NORMAL
HIV1 PROVIRAL DNA GENTYP BLD MC NAR: NORMAL

## 2019-03-20 ENCOUNTER — APPOINTMENT (OUTPATIENT)
Dept: VASCULAR SURGERY | Facility: CLINIC | Age: 44
End: 2019-03-20
Payer: MEDICAID

## 2019-03-20 PROCEDURE — 93970 EXTREMITY STUDY: CPT

## 2019-03-22 NOTE — ASSESSMENT
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

## 2019-03-22 NOTE — HISTORY OF PRESENT ILLNESS
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 Patient underwent chemotherapy for Kaposi's sarcoma and subsequently developed skin changes of the right lower extremity. His right leg remained edematous posttreatment. The edema has improved with time and leg elevation, however skin thickening and discoloration persist. He has attempted wearing compression garments in the past without improvement.  Denies history of DVT or SVT or trauma. Denies recent weight gain. No history of hypercoagulable disorder. Denies claudication or rest pain or ulcers. Pt exercises regularly with moderate walking. Denies chest pain, SOB, dyspnea on exertion, or orthopnea.\par

## 2019-03-22 NOTE — PHYSICAL EXAM
[Carotid Bruits] : no carotid bruits [Normal Breath Sounds] : Normal breath sounds [Normal Heart Sounds] : normal heart sounds [Normal Rate and Rhythm] : normal rate and rhythm [Abdominal Aorta] : Normal abdominal aorta [2+] : left 2+ [Ankle Swelling (On Exam)] : present [Ankle Swelling On The Left] : moderate [Varicose Veins Of Lower Extremities] : present [Varicose Veins Of The Right Leg] : of the right leg [] : of the right leg [Ankle Swelling On The Right] : mild [Abdominal Bruit] : no abdominal bruit  [Skin Ulcer] : no ulcer [Skin Induration] : induration [Alert] : alert [Oriented to Person] : oriented to person [Oriented to Place] : oriented to place [Oriented to Time] : oriented to time [Calm] : calm [de-identified] : NAD [de-identified] : EOMI, MMM [FreeTextEntry1] : peau d'orange appearance to skin of RLE from knee to ankle; warm, pink feet bilaterally [de-identified] : soft, NT, ND, no pulsatile mass [de-identified] : FROM of all 4 extremities

## 2019-03-25 ENCOUNTER — RX RENEWAL (OUTPATIENT)
Age: 44
End: 2019-03-25

## 2019-04-15 ENCOUNTER — RX RENEWAL (OUTPATIENT)
Age: 44
End: 2019-04-15

## 2019-04-15 ENCOUNTER — TRANSCRIPTION ENCOUNTER (OUTPATIENT)
Age: 44
End: 2019-04-15

## 2019-04-16 ENCOUNTER — RX RENEWAL (OUTPATIENT)
Age: 44
End: 2019-04-16

## 2019-05-15 ENCOUNTER — RX RENEWAL (OUTPATIENT)
Age: 44
End: 2019-05-15

## 2019-05-16 ENCOUNTER — TRANSCRIPTION ENCOUNTER (OUTPATIENT)
Age: 44
End: 2019-05-16

## 2019-05-16 ENCOUNTER — RX RENEWAL (OUTPATIENT)
Age: 44
End: 2019-05-16

## 2019-06-05 ENCOUNTER — OUTPATIENT (OUTPATIENT)
Dept: OUTPATIENT SERVICES | Facility: HOSPITAL | Age: 44
LOS: 1 days | Discharge: HOME | End: 2019-06-05

## 2019-06-05 DIAGNOSIS — C46.0 KAPOSI'S SARCOMA OF SKIN: ICD-10-CM

## 2019-06-06 ENCOUNTER — APPOINTMENT (OUTPATIENT)
Dept: INFECTIOUS DISEASE | Facility: CLINIC | Age: 44
End: 2019-06-06

## 2019-06-10 ENCOUNTER — APPOINTMENT (OUTPATIENT)
Dept: HEMATOLOGY ONCOLOGY | Facility: CLINIC | Age: 44
End: 2019-06-10
Payer: MEDICARE

## 2019-06-10 VITALS
HEART RATE: 67 BPM | WEIGHT: 199.3 LBS | TEMPERATURE: 97.8 F | RESPIRATION RATE: 20 BRPM | SYSTOLIC BLOOD PRESSURE: 125 MMHG | DIASTOLIC BLOOD PRESSURE: 82 MMHG | BODY MASS INDEX: 30.3 KG/M2 | OXYGEN SATURATION: 97 %

## 2019-06-10 PROCEDURE — 99215 OFFICE O/P EST HI 40 MIN: CPT

## 2019-06-11 ENCOUNTER — TRANSCRIPTION ENCOUNTER (OUTPATIENT)
Age: 44
End: 2019-06-11

## 2019-06-11 NOTE — HISTORY OF PRESENT ILLNESS
[Disease: _____________________] : Disease: [unfilled] [AJCC Stage: ____] : AJCC Stage: [unfilled] [Cycle: ___] : Cycle: [unfilled] [de-identified] : Nolan is a 44 M seen with life threatening presentation in 10/2016, of AIDS and complicating infections. His KS has come to the fore in the weeks after his diagnosis and treatment affecting RLE, LLE, lungs at a minimum. He received one cycle of PLD/Doxil in hospital with improvement, and received 6 cycles with some improvement in disease. He has been stable off two courses of PLD/Doxil Rx save for recurrent cellulitis. \par \par He is seen for advice, opinion on further management. \par \par HPI:\par \par 2016: 10-15 lb weight loss, night sweats, dysphagia. Notes he recalls little or nothing of the last two weeks prior to hospitalization, being taken by water ambulance from [place] to hospital \par \par 10/5/2016-10/16/2016: Admit [facility] with respiratory failure, initial diagnosis of AIDS. CF4 = 4, CF4/8 = 0/12, HIV RNA VL = [id number]. Recalls tested negative in , and states was negative by Oraquick test in 2016. Admitted with WBC 1.2, Hb 11.6, , Na 123, Creat 1.33, ABG 7.52/28/56, with CXR with opacity in RLL. Rx abx, Rx Bipap and care in MICU. LEFT and RIGHT  leg swelling noted, U/S negative for clot. CT angio showed no PE. Multifocal pneumonia noted on CT. Found with enterovirus, rhinovirus, RSV positive, and HIV positive. Cx showed S. pneumonia. AFB negative in sputa to date. Hep B also found positive, viral load 98 IU/mL.  U/S showed gallstones but no cholecystitis. Also positive for oropharyngeal thrus.  Found also with severe protein chen malnutrition, suggested supplements.  \par \par 10/22/2016: Readmit with increasing RIGHT lower extremity pain and swelling. Multifocal lesions noted worsening over shin > posteriorly. RIGHT foot with substantial edema causing continuous unbearable pain.  \par \par 10/31/2016: Eventually Bx skin lesions showing KS. Rx x 1 PLD/Doxil before DC to rehab, which per patient and mom's report had been an utter disaster in terms of transition of care with medications and diagnoses incorrect at multiple junctures during stay. \par \par 16 : Reports R lower extremity swelling is much improved.  R lower extremity feels better, no open lesions. No other lesions noted. He is improving in terms of activity and now at home after being DC'd from rehab.\par \par 16 : Cycle 3 D 1 of PLD Dox. Reports felling well and his R leg is much improved after his 2nd cycle. Today his WBC is 2.7 and ANC of 300. He reports that he had an episode of fever on 16, 101F but did not report. He has not had any fevers since then and does not have chills, malaise or other S/S of infection. \par PLD/DOX held for 2 weeks for recovery.\par \par : Was seen by Dr. Alonzo at ID clinic for AIDS and Hep B. DDI noted with PLD/DOX and Tenofovir (CY inhibition) in [facility]. Tenofovir being changed to Descovy and Tivicay in order to avoid drug-interactions. This may have prolonged neutropenia and PLD/Dox to stay longer. His PLD/DOX is dose reduced to 35 mg/m2 today as well. Today's CBC+diff is pending. \par \par 17: Cycle 4 today.  Cycle 3 dose reduced to 35 mg/m2 secondary to NTP and HIV medication was changed. Abscess under R axilla went to urgent center. Was was on clindamycin for 7 days(11). Now completely resolved.   He would like to go to a "salt steam house" for relaxation and breathing. \par \par 3/16/17: Here for cycle 5 of PLDox. He had another abscess under his right arm, completed clindamycin x 7 days. Continues to be followed closely with Dr. Alonzo(ID) for HIV. \par Weight gain on therapy, otherwise feels well. \par \par 17: Nolan comes in for cycle 6 of therapy. He had difficult time with his last cycle fatigue which lasted for about 4 days. He is fully recovered and feels well. He has gained a considerable amount of weight since starting therapy. Now moved back to [place] and walked 8 miles yesterday. No SOB, JULIEN, pain. \par \par 17 : Nolan is here for a follow up. He had CT chest on 17 showing significant improvement.  He reports R leg swelling worsening over a week and there is a small cut in the anterior portion 3 mm, oozing serous fluid and contralateral leg edema for 2 weeks with some read patches along the anterior portion of the shin, tender to palpate. No identifiable injury on the skin.  Saw Dr. Silva for follow up this morning. Currently on Descovy and Tivicay for HIV.\par \par 17 : Nolan comes  in today for a follow up. Completed 6 cycles of PLD/Doxil in  and is back in [facility]. Has had erythematous bilateral LE with edema on his last visit, we decided to monitor rather than biopsy and additional tests.   \par \par 10/6/17: Here for a follow up. He had acute hep A with elevated LFTs in September. He had fatigue at that time and refused to go to the ED as he was on [place]. His blood work from ID showed elevated LFTs.  CT chest today - results not on  as of 12 noon. He also notes pain in the sole of the foot and increased edema causing pain.\par \par 10/31/17: Nolan's LEFT leg and foot erythema and edema has improved only slightly since our last visit with ABX and now here for further discussion regarding management of KS complicating AIDS, with substantial symptoms from the damage the tumor has done to the soft tissue of the right > left lower extremity. CT chest showed stable changes with small pulmonary nodules and hypertension. Taking tramadol for pain. Pain in rated 3 on the tramadol. \par \par 2018:  Recent erythema right LE again. This dissipated within a few days. There was no warmth or erythema to suggest a cellulitis.  He notes increased xerosis and bumpiness to the skin and is out of triamcinolone and urea creams. He noted "PMS like symptoms" a day or two after chemotherapy, alleviated with acupuncture but likely related to pre treatment dexamethasone. \par \par 18 : Here for a follow up and for cycle 10 of PLD/Dox. He is due to go back to [place] to the summer after this Rx. He had substantial agitation at his ID visit, and was referred to a psychiatrist, who Rx clonazepam 0.5 bid with good effect. He had heartburn and agitation after last cycle of Rx, ? related to dexamethasone. He has continued leg pain and has heard of cannabis oil for the leg for pain. 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 His leg looks better than it has since diagnosis, however. He still has occasional pain and has skin issues such as cellulitis from time to time.  \par \par 12/3/2018: RTC feeling well, had tick bite this summer but no cellulitis. Still with anxiety disorder but finds present Rx with clonazepam and zolpidem helping overall. Occ respiratory issues with cold weather coming on, and also has some muscle fatigue / cramping in legs after working at the end of a day. \par \par 06/10/2019: Has baseline dyspnea with exertion and peripheral edema, but doing relatively well. He has done well with lymphedema therapy with compression stockings and other intervention since his surgical visit 2019. Skin is still friable and prone to ooze or bleed with injury but there is no violaceous recurrence to suggest KS recurrence\par \par He is now seen for advice, opinion on further management [de-identified] : By far most disease over right shin area, circumferentially around the lower calf. only scattered lesions elsewhere. About 20 cm (craniocaudal) x 10 cm of confluent tumor [FreeTextEntry1] : PLD/DOX every 28 days x 6 cycles, last cycle 4/20/17 before resuming with a new course later in 2017; now on observation only since spring 2018 [de-identified] : See above

## 2019-06-11 NOTE — REVIEW OF SYSTEMS
[Anxiety] : anxiety [Negative] : Allergic/Immunologic [Night Sweats] : no night sweats [FreeTextEntry2] : Working full time with limitations secondary to the pain and swelling of both legs.  [FreeTextEntry6] : Bad hiccups after dexamethasone Rx [de-identified] : RLE still discolored but better moisturized and more supple (uses baby oil), no obviously active lesions at present. Cellulitis resolved.

## 2019-06-11 NOTE — CONSULT LETTER
[Dear  ___] : Dear ~MERCEDEZ, [Courtesy Letter:] : I had the pleasure of seeing your patient, [unfilled], in my office today. [Please see my note below.] : Please see my note below. [Consult Closing:] : Thank you very much for allowing me to participate in the care of this patient.  If you have any questions, please do not hesitate to contact me. [Sincerely,] : Sincerely, [DrCharley  ___] : Dr. BARRERA [FreeTextEntry3] : JORGE Durbin\[facility] [FreeTextEntry1] : He is doing well on imaging and as well as can be expected on exam, given the lymphedema that remains after Rx of his KS. We will see him again in about 6 months with new imaging looking at his pulmonary KS, and continue medication refills as needed for a variety of issues.  [FreeTextEntry2] : Tobin Perez MD, Infectious Disease

## 2019-06-11 NOTE — RESULTS/DATA
[FreeTextEntry1] : CBC, CMP from 2/26/2019 reviewed--everything in normal range. HIV load undetectable.  No new imaging.

## 2019-06-11 NOTE — PHYSICAL EXAM
[Restricted in physically strenuous activity but ambulatory and able to carry out work of a light or sedentary nature] : Status 1- Restricted in physically strenuous activity but ambulatory and able to carry out work of a light or sedentary nature, e.g., light house work, office work [Normal] : grossly intact [de-identified] : NOTES PAIN AT 3-4 / 10  06/11/2019 [de-identified] : LE lymphedema as noted below post damage from KS [de-identified] : 1+ edema to mid thigh on right; limited ROM from pain right foot, +1 L lower leg edema, no erythema noted. (+) xerosis over same area [de-identified] : R leg with less xerosis, corrugated aspect of the calf and shin with baby oil and other emollients.  [de-identified] : Still with anxiety issues, ? increasing agoraphobia

## 2019-06-11 NOTE — ASSESSMENT
[Supportive] : Goals of care discussed with patient: Supportive [Palliative Care Plan] : not applicable at this time [FreeTextEntry1] : Imp/Plan: Complex 44 M with AIDS and KS as principal complicating issue. He is doing very well 2 years + since diagnosis. \par \par Responding to PLD/DOX however  after cycle 2 and cycle 3 delayed x 2 weeks; this was due to an interaction with his HIV medication. Completed 6 cycles. Worse clinically so he restarted late 2017 and finished another 6 cycles.  He had changed his ART so does not need pegfilgrastim to help boost blood counts. \par \par --KS ; No disease worsening overall. Imaging of chest shows improvement overall, 11/2018. \par \par Hold on further RX, RTC 6 mo in follow up - sometime in early May 2019, no scans, just clinical follow up since he gets labs through his ID MD. \par \par -- Consider pazopanib vs lenalidomide vs trial if disease worse.\par \par --ARV had been changed from changed from Genvoya to Descovy and Tivicay and now to new combination. \par \par AIDS/Hep B: Continue close follow up with Dr Rouse in I.D.\par \par Pain: continue Tramadol as needed for the time being. Rx not renewed at this visit but he sends portal messages for rx renewal\par \par Seen by MD only today\par \par JORGE Durbin MD PhD \par

## 2019-06-11 NOTE — REASON FOR VISIT
[Follow-Up Visit] : a follow-up [Parent] : parent [FreeTextEntry2] : 43M HIV-associated Kaposi sarcoma, mostly affecting right lower extremity, causing damage to the soft tissue of the right lower extremity.

## 2019-06-13 ENCOUNTER — RX RENEWAL (OUTPATIENT)
Age: 44
End: 2019-06-13

## 2019-06-18 ENCOUNTER — RX RENEWAL (OUTPATIENT)
Age: 44
End: 2019-06-18

## 2019-07-11 ENCOUNTER — RX RENEWAL (OUTPATIENT)
Age: 44
End: 2019-07-11

## 2019-07-15 ENCOUNTER — RX RENEWAL (OUTPATIENT)
Age: 44
End: 2019-07-15

## 2019-08-12 ENCOUNTER — RX RENEWAL (OUTPATIENT)
Age: 44
End: 2019-08-12

## 2019-08-13 ENCOUNTER — RX RENEWAL (OUTPATIENT)
Age: 44
End: 2019-08-13

## 2019-08-26 ENCOUNTER — APPOINTMENT (OUTPATIENT)
Dept: INFECTIOUS DISEASE | Facility: CLINIC | Age: 44
End: 2019-08-26
Payer: MEDICARE

## 2019-08-26 VITALS
HEART RATE: 86 BPM | WEIGHT: 191 LBS | BODY MASS INDEX: 28.95 KG/M2 | HEIGHT: 68 IN | OXYGEN SATURATION: 96 % | TEMPERATURE: 97.9 F | RESPIRATION RATE: 20 BRPM | SYSTOLIC BLOOD PRESSURE: 135 MMHG | DIASTOLIC BLOOD PRESSURE: 91 MMHG

## 2019-08-26 PROCEDURE — 99214 OFFICE O/P EST MOD 30 MIN: CPT

## 2019-08-26 RX ORDER — DIPHENHYDRAMINE HCL 25 MG
CAPSULE ORAL
Refills: 0 | Status: COMPLETED | COMMUNITY
End: 2019-08-26

## 2019-08-26 NOTE — PHYSICAL EXAM
[General Appearance - Alert] : alert [General Appearance - In No Acute Distress] : in no acute distress [Sclera] : the sclera and conjunctiva were normal [PERRL With Normal Accommodation] : pupils were equal in size, round, reactive to light [Extraocular Movements] : extraocular movements were intact [Outer Ear] : the ears and nose were normal in appearance [Oropharynx] : the oropharynx was normal with no thrush [Neck Appearance] : the appearance of the neck was normal [Neck Cervical Mass (___cm)] : no neck mass was observed [Jugular Venous Distention Increased] : there was no jugular-venous distention [Thyroid Diffuse Enlargement] : the thyroid was not enlarged [Auscultation Breath Sounds / Voice Sounds] : lungs were clear to auscultation bilaterally [Heart Rate And Rhythm] : heart rate was normal and rhythm regular [Heart Sounds] : normal S1 and S2 [Heart Sounds Gallop] : no gallops [Murmurs] : no murmurs [Heart Sounds Pericardial Friction Rub] : no pericardial rub [Full Pulse] : the pedal pulses are present [Edema] : there was no peripheral edema [Bowel Sounds] : normal bowel sounds [Abdomen Soft] : soft [Abdomen Tenderness] : non-tender [Abdomen Mass (___ Cm)] : no abdominal mass palpated [Costovertebral Angle Tenderness] : no CVA tenderness [No Palpable Adenopathy] : no palpable adenopathy [Musculoskeletal - Swelling] : no joint swelling [Nail Clubbing] : no clubbing  or cyanosis of the fingernails [Motor Tone] : muscle strength and tone were normal [Skin Color & Pigmentation] : normal skin color and pigmentation [] : no rash [Deep Tendon Reflexes (DTR)] : deep tendon reflexes were 2+ and symmetric [Sensation] : the sensory exam was normal to light touch and pinprick [No Focal Deficits] : no focal deficits [Affect] : the affect was normal [Oriented To Time, Place, And Person] : oriented to person, place, and time [FreeTextEntry1] : right LE brawny edema

## 2019-08-26 NOTE — ASSESSMENT
[FreeTextEntry1] : HIV doing well on Biktarvy\par VL at LL viremia despite complete adherence and no use of minerals or antacids\par GT with 74I INSTI polymorphism, no resistance\par Has lost over 20 lbs since February, continue healthy eating\par Has periumbilical hernia, refer to general surgery\par Needs CT chest in October\par CHeck VL and CD4 today\par Continue bactrim till CD4 > 200\par Monitor renal function\par Vitamin D today\par Hepatitis B, check Core Ab and Ag, if negative start Heplisav\par Uses baby oil on leg, very itchy, tried aquaphor and did not help. Had a cream from [place] that worked.

## 2019-08-26 NOTE — HISTORY OF PRESENT ILLNESS
[FreeTextEntry1] : HIV controlled on Biktarvy\par Using bactrim TIW as prophylaxis\par He is mostly out at [place]\par prn allergy medication\par Gets pain in right leg when he has stress\par \par No intercurrent illness\par Anxiety is high\par \par 2 weeks ago he felt he may have been bit by something near his belly button and it pops out.\par Painful at times and bulges out\par

## 2019-08-26 NOTE — REVIEW OF SYSTEMS
[Abdominal Pain] : abdominal pain [Negative] : Heme/Lymph [___ # of Missed Doses in The Past Week] : [unfilled] doses missed in the past week

## 2019-09-09 ENCOUNTER — TRANSCRIPTION ENCOUNTER (OUTPATIENT)
Age: 44
End: 2019-09-09

## 2019-09-09 ENCOUNTER — CHART COPY (OUTPATIENT)
Age: 44
End: 2019-09-09

## 2019-09-10 ENCOUNTER — RX RENEWAL (OUTPATIENT)
Age: 44
End: 2019-09-10

## 2019-09-10 ENCOUNTER — TRANSCRIPTION ENCOUNTER (OUTPATIENT)
Age: 44
End: 2019-09-10

## 2019-10-07 NOTE — ED ADULT TRIAGE NOTE - CCCP TRG CHIEF CMPLNT
medical evaluation Completed Therapy?: No Xerosis Aggressive Treatment: I recommended application of Cetaphil or CeraVe numerous times a day and before going to bed to all dry areas. I also prescribed a topical steroid for twice daily use. Show Text Field For Brand Names Of Contraception?: Yes Pounds Preamble Statement (Weight Entered In Details Tab): Reported Weight in pounds:140 Cheilitis Aggressive Treatment: I recommended application of Vaseline or Aquaphor numerous times a day (as often as every hour) and before going to bed. I also prescribed a topical steroid for twice daily use. Kilograms Preamble Statement (Weight Entered In Details Tab): Reported Weight in kilograms: Hypertriglyceridemia Monitoring: I explained this is common when taking isotretinoin. If this worsens they will contact us. Female Completion Statement: After discussing her treatment course we decided to discontinue isotretinoin therapy at this time. I explained that she would need to continue her birth control methods for at least one month after the last dosage. She should also get a pregnancy test one month after the last dose. She shouldn't donate blood for one month after the last dose. She should call with any new symptoms of depression. Is Cheilitis Present?: Yes - Normal Treatment Hypertriglyceridemia Treatment: I explained this is common when taking isotretinoin. If this worsens they will contact us. They may try OTC ibuprofen. Hypercholesterolemia Monitoring: I explained this is common when taking isotretinoin. We will monitor closely. Male Completion Statement: After discussing his treatment course we decided to discontinue isotretinoin therapy at this time. He shouldn't donate blood for one month after the last dose. He should call with any new symptoms of depression. Ipledge Number (Optional): [id number] Weight Units: pounds Headache Monitoring: I recommended monitoring the headaches for now. There is no evidence of increased intracranial pressure. They were instructed to call if the headaches are worsening. Cheilitis Normal Treatment: I recommended application of Vaseline or Aquaphor numerous times a day (as often as every hour) and before going to bed. Patient Weight (Optional But Required For Cumulative Dose-Numbers And Decimals Only): 140 Months Of Therapy Completed: 1 Next Month's Dosage: 40mg BID Retinoid Dermatitis Normal Treatment: I recommended more frequent application of Cetaphil or CeraVe to the areas of dermatitis. Retinoid Dermatitis Aggressive Treatment: I recommended more frequent application of Cetaphil or CeraVe to the areas of dermatitis. I also prescribed a topical steroid for twice daily use until the dermatitis resolves. Nosebleeds Normal Treatment: I explained this is common when taking isotretinoin. I recommended saline mist in each nostril multiple times a day. If this worsens they will contact us. Xerosis Normal Treatment: I recommended application of Cetaphil or CeraVe numerous times a day going to bed to all dry areas. Dosing Month 1 (Required For Cumulative Dosing): 40mg Daily Xerosis Aggressive Treatment: I recommended application of Cetaphil or CeraVe numerous times a day going to bed to all dry areas. I also prescribed a topical steroid for twice daily use. Use Therapeutic Ranged Or Therapeutic Target: please select Range or Target Detail Level: Zone Lower Range (In Mg/Kg): 120 Counseling Text: I reviewed the side effect in detail. Patient should get monthly blood tests, not donate blood, not drive at night if vision affected, and not share medication. What Is The Patient's Gender: Female Upper Range (In Mg/Kg): 150 Female Pregnancy Counseling Text: Female patients should also be on two forms of birth control while taking this medication and for one month after their last dose. Xerosis Normal Treatment: I recommended application of Cetaphil or CeraVe numerous times a day and before going to bed to all dry areas. Target Cumulative Dosage (In Mg/Kg): 135

## 2019-10-08 ENCOUNTER — RX RENEWAL (OUTPATIENT)
Age: 44
End: 2019-10-08

## 2019-10-09 ENCOUNTER — RX RENEWAL (OUTPATIENT)
Age: 44
End: 2019-10-09

## 2019-10-10 ENCOUNTER — MEDICATION RENEWAL (OUTPATIENT)
Age: 44
End: 2019-10-10

## 2019-10-10 ENCOUNTER — RX RENEWAL (OUTPATIENT)
Age: 44
End: 2019-10-10

## 2019-10-14 ENCOUNTER — CLINICAL ADVICE (OUTPATIENT)
Age: 44
End: 2019-10-14

## 2019-10-17 ENCOUNTER — TRANSCRIPTION ENCOUNTER (OUTPATIENT)
Age: 44
End: 2019-10-17

## 2019-10-18 ENCOUNTER — RX RENEWAL (OUTPATIENT)
Age: 44
End: 2019-10-18

## 2019-10-22 ENCOUNTER — MESSAGE (OUTPATIENT)
Age: 44
End: 2019-10-22

## 2019-10-22 ENCOUNTER — OUTPATIENT (OUTPATIENT)
Dept: OUTPATIENT SERVICES | Facility: HOSPITAL | Age: 44
LOS: 1 days | Discharge: HOME | End: 2019-10-22

## 2019-10-22 DIAGNOSIS — C46.0 KAPOSI'S SARCOMA OF SKIN: ICD-10-CM

## 2019-11-04 ENCOUNTER — RX RENEWAL (OUTPATIENT)
Age: 44
End: 2019-11-04

## 2019-11-04 ENCOUNTER — APPOINTMENT (OUTPATIENT)
Dept: HEMATOLOGY ONCOLOGY | Facility: CLINIC | Age: 44
End: 2019-11-04
Payer: MEDICARE

## 2019-11-04 VITALS
TEMPERATURE: 97.4 F | SYSTOLIC BLOOD PRESSURE: 129 MMHG | BODY MASS INDEX: 30.13 KG/M2 | WEIGHT: 198.19 LBS | HEART RATE: 69 BPM | OXYGEN SATURATION: 98 % | DIASTOLIC BLOOD PRESSURE: 84 MMHG | RESPIRATION RATE: 20 BRPM

## 2019-11-04 PROCEDURE — 99214 OFFICE O/P EST MOD 30 MIN: CPT

## 2019-11-04 RX ORDER — MUPIROCIN 20 MG/G
2 OINTMENT TOPICAL
Qty: 3 | Refills: 4 | Status: COMPLETED | COMMUNITY
Start: 2019-11-04 | End: 2019-11-04

## 2019-11-04 NOTE — ASSESSMENT
[Supportive] : Goals of care discussed with patient: Supportive [Palliative Care Plan] : not applicable at this time [FreeTextEntry1] : Imp/Plan: Complex 44 M with AIDS and KS as principal complicating issue. He is doing very well 2 years + since diagnosis. \par \par Responding to PLD/DOX however  after cycle 2 and cycle 3 delayed x 2 weeks; this was due to an interaction with his HIV medication. Completed 6 cycles. Worse clinically so he restarted late 2017 and finished another 6 cycles.  He had changed his ART so does not need pegfilgrastim to help boost blood counts. \par \par --KS ; No disease worsening overall. Imaging of chest shows improvement overall as of 11/2019. \par \par Hold on further RX, RTC 6 mo in follow up - sometime in early May 2019, no scans, just clinical follow up since he gets labs through his ID MD.  He will see a new physician at [facility] as I am imminently departing. \par \par -- Consider pazopanib vs lenalidomide vs trial if disease worse.\par \par --ARV had been changed from changed from Genvoya to Descovy and Tivicay and now to new combination. \par \par AIDS/Hep B: Continue close follow up with Dr Rouse in I.D.\par \par Pain: continue Tramadol as needed for the time being. Rx not renewed at this visit but he sends portal messages for rx renewal\par \par Seen by MD only today\par \par JORGE Durbin MD PhD

## 2019-11-04 NOTE — END OF VISIT
[FreeTextEntry3] : n/a - MD visit only today [>50% of Time Spent on Counseling and Coordination of Care for  ___] : Greater than 50% of the encounter time was spent on counseling and coordination of care for [unfilled] [Time Spent: ___ minutes] : I have spent [unfilled] minutes of face to face time with the patient

## 2019-11-04 NOTE — PHYSICAL EXAM
[Restricted in physically strenuous activity but ambulatory and able to carry out work of a light or sedentary nature] : Status 1- Restricted in physically strenuous activity but ambulatory and able to carry out work of a light or sedentary nature, e.g., light house work, office work [Normal] : grossly intact [de-identified] : pain still  3-4 / 10  11/2019 [de-identified] : Brawny RLE > LLE  lymphedema as noted below post damage from KS [de-identified] : 1+ edema to mid thigh on right; limited ROM from pain right foot, +1 L lower leg edema, no erythema noted. (+) xerosis over same area [de-identified] : R leg with less xerosis, corrugated aspect of the calf and shin with baby oil and other emollients.  [de-identified] : Still with anxiety issues, ? increasing agoraphobia

## 2019-11-04 NOTE — HISTORY OF PRESENT ILLNESS
[Disease: _____________________] : Disease: [unfilled] [AJCC Stage: ____] : AJCC Stage: [unfilled] [Cycle: ___] : Cycle: [unfilled] [de-identified] : Nolan is a 44 M seen with life threatening presentation in 10/2016, of AIDS and complicating infections. His KS has come to the fore in the weeks after his diagnosis and treatment affecting RLE, LLE, lungs at a minimum. He received one cycle of PLD/Doxil in hospital with improvement, and received 6 cycles with some improvement in disease. He has been stable off two courses of PLD/Doxil Rx save for recurrent cellulitis. \par \par He is seen for advice, opinion on further management. \par \par HPI:\par \par 2016: 10-15 lb weight loss, night sweats, dysphagia. Notes he recalls little or nothing of the last two weeks prior to hospitalization, being taken by water ambulance from [place] to hospital \par \par 10/5/2016-10/16/2016: Admit [facility] with respiratory failure, initial diagnosis of AIDS. CF4 = 4, CF4/8 = 0/12, HIV RNA VL = 19776. Recalls tested negative in , and states was negative by Oraquick test in 2016. Admitted with WBC 1.2, Hb 11.6, , Na 123, Creat 1.33, ABG 7.52/28/56, with CXR with opacity in RLL. Rx abx, Rx Bipap and care in MICU. LEFT and RIGHT  leg swelling noted, U/S negative for clot. CT angio showed no PE. Multifocal pneumonia noted on CT. Found with enterovirus, rhinovirus, RSV positive, and HIV positive. Cx showed S. pneumonia. AFB negative in sputa to date. Hep B also found positive, viral load 98 IU/mL.  U/S showed gallstones but no cholecystitis. Also positive for oropharyngeal thrus.  Found also with severe protein chen malnutrition, suggested supplements.  \par \par 10/22/2016: Readmit with increasing RIGHT lower extremity pain and swelling. Multifocal lesions noted worsening over shin > posteriorly. RIGHT foot with substantial edema causing continuous unbearable pain.  \par \par 10/31/2016: Eventually Bx skin lesions showing KS. Rx x 1 PLD/Doxil before DC to rehab, which per patient and mom's report had been an utter disaster in terms of transition of care with medications and diagnoses incorrect at multiple junctures during stay. \par \par 16 : Reports R lower extremity swelling is much improved.  R lower extremity feels better, no open lesions. No other lesions noted. He is improving in terms of activity and now at home after being DC'd from rehab.\par \par 16 : Cycle 3 D 1 of PLD Dox. Reports felling well and his R leg is much improved after his 2nd cycle. Today his WBC is 2.7 and ANC of 300. He reports that he had an episode of fever on 16, 101F but did not report. He has not had any fevers since then and does not have chills, malaise or other S/S of infection. \par PLD/DOX held for 2 weeks for recovery.\par \par : Was seen by Dr. Alonzo at ID clinic for AIDS and Hep B. DDI noted with PLD/DOX and Tenofovir (CY inhibition) in [facility]. Tenofovir being changed to Descovy and Tivicay in order to avoid drug-interactions. This may have prolonged neutropenia and PLD/Dox to stay longer. His PLD/DOX is dose reduced to 35 mg/m2 today as well. Today's CBC+diff is pending. \par \par 17: Cycle 4 today.  Cycle 3 dose reduced to 35 mg/m2 secondary to NTP and HIV medication was changed. Abscess under R axilla went to urgent center. Was was on clindamycin for 7 days(11). Now completely resolved.   He would like to go to a "salt steam house" for relaxation and breathing. \par \par 3/16/17: Here for cycle 5 of PLDox. He had another abscess under his right arm, completed clindamycin x 7 days. Continues to be followed closely with Dr. Alonzo(ID) for HIV. \par Weight gain on therapy, otherwise feels well. \par \par 17: Nolan comes in for cycle 6 of therapy. He had difficult time with his last cycle fatigue which lasted for about 4 days. He is fully recovered and feels well. He has gained a considerable amount of weight since starting therapy. Now moved back to [place] and walked 8 miles yesterday. No SOB, JULIEN, pain. \par \par 17 : Nolan is here for a follow up. He had CT chest on 17 showing significant improvement.  He reports R leg swelling worsening over a week and there is a small cut in the anterior portion 3 mm, oozing serous fluid and contralateral leg edema for 2 weeks with some read patches along the anterior portion of the shin, tender to palpate. No identifiable injury on the skin.  Saw Dr. Silva for follow up this morning. Currently on Descovy and Tivicay for HIV.\par \par 17 : Nolan comes  in today for a follow up. Completed 6 cycles of PLD/Doxil in  and is back in [facility]. Has had erythematous bilateral LE with edema on his last visit, we decided to monitor rather than biopsy and additional tests.   \par \par 10/6/17: Here for a follow up. He had acute hep A with elevated LFTs in September. He had fatigue at that time and refused to go to the ED as he was on [place]. His blood work from ID showed elevated LFTs.  CT chest today - results not on  as of 12 noon. He also notes pain in the sole of the foot and increased edema causing pain.\par \par 10/31/17: Nolan's LEFT leg and foot erythema and edema has improved only slightly since our last visit with ABX and now here for further discussion regarding management of KS complicating AIDS, with substantial symptoms from the damage the tumor has done to the soft tissue of the right > left lower extremity. CT chest showed stable changes with small pulmonary nodules and hypertension. Taking tramadol for pain. Pain in rated 3 on the tramadol. \par \par 2018:  Recent erythema right LE again. This dissipated within a few days. There was no warmth or erythema to suggest a cellulitis.  He notes increased xerosis and bumpiness to the skin and is out of triamcinolone and urea creams. He noted "PMS like symptoms" a day or two after chemotherapy, alleviated with acupuncture but likely related to pre treatment dexamethasone. \par \par 18 : Here for a follow up and for cycle 10 of PLD/Dox. He is due to go back to [place] to the summer after this Rx. He had substantial agitation at his ID visit, and was referred to a psychiatrist, who Rx clonazepam 0.5 bid with good effect. He had heartburn and agitation after last cycle of Rx, ? related to dexamethasone. He has continued leg pain and has heard of cannabis oil for the leg for pain. 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 His leg looks better than it has since diagnosis, however. He still has occasional pain and has skin issues such as cellulitis from time to time.  \par \par 12/3/2018: RTC feeling well, had tick bite this summer but no cellulitis. Still with anxiety disorder but finds present Rx with clonazepam and zolpidem helping overall. Occ respiratory issues with cold weather coming on, and also has some muscle fatigue / cramping in legs after working at the end of a day. \par \par 06/10/2019: Has baseline dyspnea with exertion and peripheral edema, but doing relatively well. He has done well with lymphedema therapy with compression stockings and other intervention since his surgical visit 2019. Skin is still friable and prone to ooze or bleed with injury but there is no violaceous recurrence to suggest KS recurrence. \par \par 19 : Here for a follow up. Doing better overall with lymphedema therapy, but still with residual scarring of legs / chronic edematous changes in RLE > LLE. A couple of mosquito bites have appeared to turn into early cellulitis over the summer but he has managed with topical Abx. \par \par He is now seen for advice, opinion on further management [de-identified] : By far most disease over right shin area, circumferentially around the lower calf. only scattered lesions elsewhere. About 20 cm (craniocaudal) x 10 cm of confluent tumor [FreeTextEntry1] : PLD/DOX every 28 days x 6 cycles, last cycle 4/20/17 before resuming with a new course later in 2017; now on observation only since spring 2018 [de-identified] : See above

## 2019-11-04 NOTE — REVIEW OF SYSTEMS
[Anxiety] : anxiety [Negative] : Allergic/Immunologic [Night Sweats] : no night sweats [FreeTextEntry2] : Working full time with limitations secondary to the pain and swelling of both legs.  [FreeTextEntry6] : Bad hiccups after dexamethasone Rx [de-identified] : RLE still discolored but better moisturized and more supple (uses baby oil), no obviously active lesions at present. Cellulitis resolved.

## 2019-11-04 NOTE — CONSULT LETTER
[Dear  ___] : Dear ~MERCEDEZ, [Courtesy Letter:] : I had the pleasure of seeing your patient, [unfilled], in my office today. [Please see my note below.] : Please see my note below. [Consult Closing:] : Thank you very much for allowing me to participate in the care of this patient.  If you have any questions, please do not hesitate to contact me. [Sincerely,] : Sincerely, [DrCharley  ___] : Dr. BARRERA [FreeTextEntry2] : Tobin Perez MD, Infectious Disease [FreeTextEntry1] : He is doing well on imaging and as well as can be expected on exam, given the lymphedema that remains after Rx of his KS. He will be seen again in about 6 months with new imaging looking at his pulmonary KS, and continue medication refills as needed for a variety of issues. Dr Griffin will start following after my departure later in the month.  [FreeTextEntry3] : JORGE Durbin\[facility]

## 2019-11-04 NOTE — RESULTS/DATA
[FreeTextEntry1] : CBC, CMP from 2/26/2019 reviewed--everything in normal range. HIV load undetectable.  \par \par CT Chest 10/2019 - I saw the images myself. My interpretation is no disease worsening. I have also seen the final report confirming this finding.

## 2019-11-07 ENCOUNTER — TRANSCRIPTION ENCOUNTER (OUTPATIENT)
Age: 44
End: 2019-11-07

## 2019-11-07 ENCOUNTER — RX RENEWAL (OUTPATIENT)
Age: 44
End: 2019-11-07

## 2019-12-02 ENCOUNTER — TRANSCRIPTION ENCOUNTER (OUTPATIENT)
Age: 44
End: 2019-12-02

## 2019-12-02 ENCOUNTER — RX RENEWAL (OUTPATIENT)
Age: 44
End: 2019-12-02

## 2019-12-03 ENCOUNTER — APPOINTMENT (OUTPATIENT)
Dept: INFECTIOUS DISEASE | Facility: CLINIC | Age: 44
End: 2019-12-03
Payer: MEDICARE

## 2019-12-03 VITALS
WEIGHT: 203 LBS | HEART RATE: 62 BPM | DIASTOLIC BLOOD PRESSURE: 85 MMHG | RESPIRATION RATE: 18 BRPM | TEMPERATURE: 97.1 F | OXYGEN SATURATION: 98 % | SYSTOLIC BLOOD PRESSURE: 132 MMHG | BODY MASS INDEX: 30.77 KG/M2 | HEIGHT: 68 IN

## 2019-12-03 PROCEDURE — 99214 OFFICE O/P EST MOD 30 MIN: CPT

## 2019-12-03 PROCEDURE — 90791 PSYCH DIAGNOSTIC EVALUATION: CPT

## 2019-12-03 NOTE — ASSESSMENT
[FreeTextEntry1] : HIV doing well on Biktarvy\par Check CD4 and VL, if CD4 > 200 and VL < 30 will stop bactrim\par Weight gain BMI > 30, dietician to assist, will assess next visit, check TSH, consider non-INSTI option.\par Offered flu shot\par Morning free testosterone\par \par He needs umbilical surgery surgery, will wait till after mother's surgery in January, also needs medicaid (secondary to be turned on.\par SW to meet to assess what he needs to do, Working with [employer] to get medicaid re-instated and assist with housing\par

## 2019-12-03 NOTE — PHYSICAL EXAM
[General Appearance - Alert] : alert [General Appearance - In No Acute Distress] : in no acute distress [Sclera] : the sclera and conjunctiva were normal [PERRL With Normal Accommodation] : pupils were equal in size, round, reactive to light [Extraocular Movements] : extraocular movements were intact [Outer Ear] : the ears and nose were normal in appearance [Oropharynx] : the oropharynx was normal with no thrush [Neck Appearance] : the appearance of the neck was normal [Neck Cervical Mass (___cm)] : no neck mass was observed [Jugular Venous Distention Increased] : there was no jugular-venous distention [Auscultation Breath Sounds / Voice Sounds] : lungs were clear to auscultation bilaterally [Thyroid Diffuse Enlargement] : the thyroid was not enlarged [Heart Sounds] : normal S1 and S2 [Murmurs] : no murmurs [Heart Rate And Rhythm] : heart rate was normal and rhythm regular [Heart Sounds Gallop] : no gallops [Edema] : there was no peripheral edema [Full Pulse] : the pedal pulses are present [Heart Sounds Pericardial Friction Rub] : no pericardial rub [Abdomen Soft] : soft [Bowel Sounds] : normal bowel sounds [Abdomen Tenderness] : non-tender [Abdomen Mass (___ Cm)] : no abdominal mass palpated [Costovertebral Angle Tenderness] : no CVA tenderness [No Palpable Adenopathy] : no palpable adenopathy [Motor Tone] : muscle strength and tone were normal [Nail Clubbing] : no clubbing  or cyanosis of the fingernails [Musculoskeletal - Swelling] : no joint swelling [Skin Color & Pigmentation] : normal skin color and pigmentation [Sensation] : the sensory exam was normal to light touch and pinprick [] : no rash [Deep Tendon Reflexes (DTR)] : deep tendon reflexes were 2+ and symmetric [No Focal Deficits] : no focal deficits [Affect] : the affect was normal [Oriented To Time, Place, And Person] : oriented to person, place, and time [FreeTextEntry1] : Paraumbilical hernia, reducible

## 2019-12-03 NOTE — HISTORY OF PRESENT ILLNESS
[FreeTextEntry1] : HIV doing well on Biktarvy\par Tolerating well\par Takes in the morning\par No other supplement\par No antacids\par Had heart burn yesterday\par More BM than usual, not always solid. Sometimes liquid, yellow/brown, no  blood\par Epigastric pain - Dr Hernandez - had CT- fat containing umbilical hernia, recommended open surgery with mesh.\par Wants to wait till after his mother's surgery in January\par \par \par Pain management doctor leaving, he has transferred over. Foot swells at times, uses compression machine daily. \par Saw DR Ventura for psych and he is pleased

## 2019-12-03 NOTE — REVIEW OF SYSTEMS
[Difficulty Sleeping] : difficulty sleeping [Diarrhea] : diarrhea [Abdominal Pain] : abdominal pain [Negative] : Heme/Lymph [___ # of Missed Doses in The Past Week] : [unfilled] doses missed in the past week

## 2019-12-04 LAB
ALBUMIN SERPL ELPH-MCNC: 4.1 G/DL
ALP BLD-CCNC: 63 U/L
ALT SERPL-CCNC: 37 U/L
ANION GAP SERPL CALC-SCNC: 11 MMOL/L
AST SERPL-CCNC: 30 U/L
BASOPHILS # BLD AUTO: 0.04 K/UL
BASOPHILS NFR BLD AUTO: 0.7 %
BILIRUB SERPL-MCNC: 0.8 MG/DL
BUN SERPL-MCNC: 12 MG/DL
CALCIUM SERPL-MCNC: 9.1 MG/DL
CD3 CELLS # BLD: 1332 /UL
CD3 CELLS NFR BLD: 76 %
CD3+CD4+ CELLS # BLD: 280 /UL
CD3+CD4+ CELLS NFR BLD: 16 %
CD3+CD4+ CELLS/CD3+CD8+ CLL SPEC: 0.27 RATIO
CD3+CD8+ CELLS # SPEC: 1036 /UL
CD3+CD8+ CELLS NFR BLD: 59 %
CHLORIDE SERPL-SCNC: 101 MMOL/L
CO2 SERPL-SCNC: 25 MMOL/L
CREAT SERPL-MCNC: 1.14 MG/DL
EOSINOPHIL # BLD AUTO: 0.19 K/UL
EOSINOPHIL NFR BLD AUTO: 3.4 %
GLUCOSE SERPL-MCNC: 91 MG/DL
HCT VFR BLD CALC: 48.8 %
HGB BLD-MCNC: 16.3 G/DL
IMM GRANULOCYTES NFR BLD AUTO: 0.2 %
LYMPHOCYTES # BLD AUTO: 1.83 K/UL
LYMPHOCYTES NFR BLD AUTO: 32.3 %
MAN DIFF?: NORMAL
MCHC RBC-ENTMCNC: 30.6 PG
MCHC RBC-ENTMCNC: 33.4 GM/DL
MCV RBC AUTO: 91.7 FL
MONOCYTES # BLD AUTO: 0.75 K/UL
MONOCYTES NFR BLD AUTO: 13.2 %
NEUTROPHILS # BLD AUTO: 2.85 K/UL
NEUTROPHILS NFR BLD AUTO: 50.2 %
PLATELET # BLD AUTO: 194 K/UL
POTASSIUM SERPL-SCNC: 4.1 MMOL/L
PROT SERPL-MCNC: 7.6 G/DL
RBC # BLD: 5.32 M/UL
RBC # FLD: 13.3 %
SODIUM SERPL-SCNC: 137 MMOL/L
TSH SERPL-ACNC: 3.28 UIU/ML
WBC # FLD AUTO: 5.67 K/UL

## 2019-12-05 LAB
HIV1 RNA # SERPL NAA+PROBE: ABNORMAL
HIV1 RNA # SERPL NAA+PROBE: ABNORMAL COPIES/ML
VIRAL LOAD INTERP: NORMAL
VIRAL LOAD LOG: ABNORMAL LG COP/ML

## 2019-12-20 ENCOUNTER — TRANSCRIPTION ENCOUNTER (OUTPATIENT)
Age: 44
End: 2019-12-20

## 2020-01-06 ENCOUNTER — RX RENEWAL (OUTPATIENT)
Age: 45
End: 2020-01-06

## 2020-01-13 ENCOUNTER — APPOINTMENT (OUTPATIENT)
Dept: SURGERY | Facility: CLINIC | Age: 45
End: 2020-01-13
Payer: MEDICARE

## 2020-01-13 VITALS
DIASTOLIC BLOOD PRESSURE: 75 MMHG | WEIGHT: 209 LBS | SYSTOLIC BLOOD PRESSURE: 123 MMHG | HEART RATE: 78 BPM | HEIGHT: 68 IN | TEMPERATURE: 98.2 F | BODY MASS INDEX: 31.67 KG/M2

## 2020-01-13 PROCEDURE — 99203 OFFICE O/P NEW LOW 30 MIN: CPT

## 2020-01-21 ENCOUNTER — OUTPATIENT (OUTPATIENT)
Dept: OUTPATIENT SERVICES | Facility: HOSPITAL | Age: 45
LOS: 1 days | Discharge: HOME | End: 2020-01-21
Payer: MEDICARE

## 2020-01-21 VITALS
SYSTOLIC BLOOD PRESSURE: 122 MMHG | TEMPERATURE: 98 F | HEIGHT: 68 IN | OXYGEN SATURATION: 98 % | WEIGHT: 207.23 LBS | DIASTOLIC BLOOD PRESSURE: 77 MMHG | RESPIRATION RATE: 18 BRPM | HEART RATE: 74 BPM

## 2020-01-21 DIAGNOSIS — B20 HUMAN IMMUNODEFICIENCY VIRUS [HIV] DISEASE: ICD-10-CM

## 2020-01-21 DIAGNOSIS — Z98.890 OTHER SPECIFIED POSTPROCEDURAL STATES: Chronic | ICD-10-CM

## 2020-01-21 DIAGNOSIS — Z86.19 PERSONAL HISTORY OF OTHER INFECTIOUS AND PARASITIC DISEASES: Chronic | ICD-10-CM

## 2020-01-21 DIAGNOSIS — Z86.14 PERSONAL HISTORY OF METHICILLIN RESISTANT STAPHYLOCOCCUS AUREUS INFECTION: Chronic | ICD-10-CM

## 2020-01-21 DIAGNOSIS — K43.9 VENTRAL HERNIA WITHOUT OBSTRUCTION OR GANGRENE: ICD-10-CM

## 2020-01-21 DIAGNOSIS — Z01.818 ENCOUNTER FOR OTHER PREPROCEDURAL EXAMINATION: ICD-10-CM

## 2020-01-21 DIAGNOSIS — I89.0 LYMPHEDEMA, NOT ELSEWHERE CLASSIFIED: ICD-10-CM

## 2020-01-21 DIAGNOSIS — Z90.89 ACQUIRED ABSENCE OF OTHER ORGANS: Chronic | ICD-10-CM

## 2020-01-21 LAB
ANION GAP SERPL CALC-SCNC: 7 MMOL/L — SIGNIFICANT CHANGE UP (ref 5–17)
APTT BLD: 32.8 SEC — SIGNIFICANT CHANGE UP (ref 27.5–36.3)
BUN SERPL-MCNC: 17 MG/DL — SIGNIFICANT CHANGE UP (ref 7–23)
CALCIUM SERPL-MCNC: 8.3 MG/DL — LOW (ref 8.5–10.1)
CHLORIDE SERPL-SCNC: 105 MMOL/L — SIGNIFICANT CHANGE UP (ref 96–108)
CO2 SERPL-SCNC: 25 MMOL/L — SIGNIFICANT CHANGE UP (ref 22–31)
CREAT SERPL-MCNC: 1.13 MG/DL — SIGNIFICANT CHANGE UP (ref 0.5–1.3)
GLUCOSE SERPL-MCNC: 93 MG/DL — SIGNIFICANT CHANGE UP (ref 70–99)
HCT VFR BLD CALC: 43.2 % — SIGNIFICANT CHANGE UP (ref 39–50)
HGB BLD-MCNC: 14.8 G/DL — SIGNIFICANT CHANGE UP (ref 13–17)
INR BLD: 1.11 RATIO — SIGNIFICANT CHANGE UP (ref 0.88–1.16)
MCHC RBC-ENTMCNC: 30 PG — SIGNIFICANT CHANGE UP (ref 27–34)
MCHC RBC-ENTMCNC: 34.3 GM/DL — SIGNIFICANT CHANGE UP (ref 32–36)
MCV RBC AUTO: 87.6 FL — SIGNIFICANT CHANGE UP (ref 80–100)
NRBC # BLD: 0 /100 WBCS — SIGNIFICANT CHANGE UP (ref 0–0)
PLATELET # BLD AUTO: 178 K/UL — SIGNIFICANT CHANGE UP (ref 150–400)
POTASSIUM SERPL-MCNC: 4 MMOL/L — SIGNIFICANT CHANGE UP (ref 3.5–5.3)
POTASSIUM SERPL-SCNC: 4 MMOL/L — SIGNIFICANT CHANGE UP (ref 3.5–5.3)
PROTHROM AB SERPL-ACNC: 12.5 SEC — SIGNIFICANT CHANGE UP (ref 10–12.9)
RBC # BLD: 4.93 M/UL — SIGNIFICANT CHANGE UP (ref 4.2–5.8)
RBC # FLD: 13.2 % — SIGNIFICANT CHANGE UP (ref 10.3–14.5)
SODIUM SERPL-SCNC: 137 MMOL/L — SIGNIFICANT CHANGE UP (ref 135–145)
WBC # BLD: 4.63 K/UL — SIGNIFICANT CHANGE UP (ref 3.8–10.5)
WBC # FLD AUTO: 4.63 K/UL — SIGNIFICANT CHANGE UP (ref 3.8–10.5)

## 2020-01-21 PROCEDURE — 93010 ELECTROCARDIOGRAM REPORT: CPT

## 2020-01-21 NOTE — H&P PST ADULT - ASSESSMENT
44m pmh hiv, kaposi sarcoma (s/p chemo), hepatitis B infection, elephantiasis, c/o abdominal pain and swelling found to have ventral hernia here for PST for scheduled Ventral hernia repair  CAPRINI SCORE    AGE RELATED RISK FACTORS                                                       MOBILITY RELATED FACTORS  [x ] Age 41-60 years                                            (1 Point)                  [ ] Bed rest                                                        (1 Point)  [ ] Age: 61-74 years                                           (2 Points)                [ ] Plaster cast                                                   (2 Points)  [ ] Age= 75 years                                              (3 Points)                 [ ] Bed bound for more than 72 hours                   (2 Points)    DISEASE RELATED RISK FACTORS                                               GENDER SPECIFIC FACTORS  [ ] Edema in the lower extremities                       (1 Point)                  [ ] Pregnancy                                                     (1 Point)  [ ] Varicose veins                                               (1 Point)                  [ ] Post-partum < 6 weeks                                   (1 Point)             [x ] BMI > 25 Kg/m2                                            (1 Point)                  [ ] Hormonal therapy  or oral contraception            (1 Point)                 [ ] Sepsis (in the previous month)                        (1 Point)                  [ ] History of pregnancy complications  [ ] Pneumonia or serious lung disease                                               [ ] Unexplained or recurrent                       (1 Point)           (in the previous month)                               (1 Point)  [ ] Abnormal pulmonary function test                     (1 Point)                 SURGERY RELATED RISK FACTORS  [ ] Acute myocardial infarction                              (1 Point)                 [ ]  Section                                            (1 Point)  [ ] Congestive heart failure (in the previous month)  (1 Point)                 [ ] Minor surgery                                                 (1 Point)   [ ] Inflammatory bowel disease                             (1 Point)                 [ ] Arthroscopic surgery                                        (2 Points)  [ ] Central venous access                                    (2 Points)                [ x] General surgery lasting more than 45 minutes   (2 Points)       [ ] Stroke (in the previous month)                          (5 Points)               [ ] Elective arthroplasty                                        (5 Points)                                                                                                                                               HEMATOLOGY RELATED FACTORS                                                 TRAUMA RELATED RISK FACTORS  [ ] Prior episodes of VTE                                     (3 Points)                 [ ] Fracture of the hip, pelvis, or leg                       (5 Points)  [ ] Positive family history for VTE                         (3 Points)                 [ ] Acute spinal cord injury (in the previous month)  (5 Points)  [ ] Prothrombin 16910 A                                      (3 Points)                 [ ] Paralysis  (less than 1 month)                          (5 Points)  [ ] Factor V Leiden                                             (3 Points)                 [ ] Multiple Trauma within 1 month                         (5 Points)  [ ] Lupus anticoagulants                                     (3 Points)                                                           [ ] Anticardiolipin antibodies                                (3 Points)                                                       [ ] High homocysteine in the blood                      (3 Points)                                             [ ] Other congenital or acquired thrombophilia       (3 Points)                                                [ ] Heparin induced thrombocytopenia                  (3 Points)                                          Total Score [    4     ]

## 2020-01-21 NOTE — H&P PST ADULT - NSICDXPROBLEM_GEN_ALL_CORE_FT
PROBLEM DIAGNOSES  Problem: Hepatitis B infection associated with human immunodeficiency virus infection  Assessment and Plan: Continue current regimen and medications.     Problem: Elephantiasis nostra verrucosa  Assessment and Plan: Continue current regimen and medications.     Problem: H/O AIDS with Kaposi's sarcoma  Assessment and Plan: completed chemo  Continue current regimen and medications.     Problem: HIV disease  Assessment and Plan: Continue current regimen and medications. PROBLEM DIAGNOSES  Problem: Ventral hernia without obstruction or gangrene  Assessment and Plan: Ventral hernia repair  Pre-op instructions given, patient verbalized understanding  Chlorhexidine wash instructions given   pending: Medical Clearance    Problem: Hepatitis B infection associated with human immunodeficiency virus infection  Assessment and Plan: Continue current regimen and medications.     Problem: Elephantiasis nostra verrucosa  Assessment and Plan: Continue current regimen and medications.     Problem: H/O AIDS with Kaposi's sarcoma  Assessment and Plan: completed chemo  Continue current regimen and medications.     Problem: HIV disease  Assessment and Plan: Continue current regimen and medications.

## 2020-01-21 NOTE — H&P PST ADULT - NSICDXPASTMEDICALHX_GEN_ALL_CORE_FT
PAST MEDICAL HISTORY:  Anxiety     Hepatitis B     Hepatitis B infection without delta agent without hepatic coma, unspecified chronicity     HIV (human immunodeficiency virus infection)     Kaposi's sarcoma of skin     Kaposi's sarcoma of skin s/p chemo 11/2016 - 5/2017 and 11/ 2017 - 4/2018    Substance abuse last use 2005 PAST MEDICAL HISTORY:  Anxiety     Elephantiasis nostras verrucosa     Hepatitis B     Hepatitis B infection without delta agent without hepatic coma, unspecified chronicity     HIV (human immunodeficiency virus infection)     Kaposi's sarcoma of skin     Kaposi's sarcoma of skin s/p chemo 11/2016 - 5/2017 and 11/ 2017 - 4/2018    Substance abuse last use 2005

## 2020-01-21 NOTE — H&P PST ADULT - NSANTHOSAYNRD_GEN_A_CORE
No. GUILLERMINA screening performed.  STOP BANG Legend: 0-2 = LOW Risk; 3-4 = INTERMEDIATE Risk; 5-8 = HIGH Risk

## 2020-01-21 NOTE — H&P PST ADULT - HISTORY OF PRESENT ILLNESS
44m pmh hiv, kaposi sarcoma (s/p chemo), hepatitis B infection, elephantiasis, c/o abdominal pain and swelling found to have ventral hernia here for PST for scheduled Ventral hernia repair 44M pmh hiv, kaposi sarcoma (s/p chemo), hepatitis B infection, elephantiasis, c/o abdominal pain and swelling found to have ventral hernia here for PST for scheduled Ventral hernia repair

## 2020-01-23 DIAGNOSIS — K43.9 VENTRAL HERNIA WITHOUT OBSTRUCTION OR GANGRENE: ICD-10-CM

## 2020-01-24 PROBLEM — B19.10 UNSPECIFIED VIRAL HEPATITIS B WITHOUT HEPATIC COMA: Chronic | Status: ACTIVE | Noted: 2020-01-21

## 2020-01-24 PROBLEM — I89.0 LYMPHEDEMA, NOT ELSEWHERE CLASSIFIED: Chronic | Status: ACTIVE | Noted: 2020-01-21

## 2020-01-24 PROBLEM — F19.10 OTHER PSYCHOACTIVE SUBSTANCE ABUSE, UNCOMPLICATED: Chronic | Status: ACTIVE | Noted: 2020-01-21

## 2020-01-24 PROBLEM — F41.9 ANXIETY DISORDER, UNSPECIFIED: Chronic | Status: ACTIVE | Noted: 2020-01-21

## 2020-01-27 ENCOUNTER — TRANSCRIPTION ENCOUNTER (OUTPATIENT)
Age: 45
End: 2020-01-27

## 2020-01-28 ENCOUNTER — OUTPATIENT (OUTPATIENT)
Dept: OUTPATIENT SERVICES | Facility: HOSPITAL | Age: 45
LOS: 1 days | Discharge: HOME | End: 2020-01-28
Payer: MEDICARE

## 2020-01-28 ENCOUNTER — APPOINTMENT (OUTPATIENT)
Dept: SURGERY | Facility: HOSPITAL | Age: 45
End: 2020-01-28

## 2020-01-28 VITALS
SYSTOLIC BLOOD PRESSURE: 116 MMHG | OXYGEN SATURATION: 98 % | TEMPERATURE: 98 F | WEIGHT: 207.23 LBS | HEIGHT: 68 IN | HEART RATE: 68 BPM | RESPIRATION RATE: 17 BRPM | DIASTOLIC BLOOD PRESSURE: 85 MMHG

## 2020-01-28 VITALS
DIASTOLIC BLOOD PRESSURE: 72 MMHG | TEMPERATURE: 97 F | HEART RATE: 70 BPM | RESPIRATION RATE: 16 BRPM | OXYGEN SATURATION: 97 % | SYSTOLIC BLOOD PRESSURE: 125 MMHG

## 2020-01-28 DIAGNOSIS — Z86.19 PERSONAL HISTORY OF OTHER INFECTIOUS AND PARASITIC DISEASES: Chronic | ICD-10-CM

## 2020-01-28 DIAGNOSIS — Z98.890 OTHER SPECIFIED POSTPROCEDURAL STATES: Chronic | ICD-10-CM

## 2020-01-28 DIAGNOSIS — Z90.89 ACQUIRED ABSENCE OF OTHER ORGANS: Chronic | ICD-10-CM

## 2020-01-28 DIAGNOSIS — Z86.14 PERSONAL HISTORY OF METHICILLIN RESISTANT STAPHYLOCOCCUS AUREUS INFECTION: Chronic | ICD-10-CM

## 2020-01-28 PROCEDURE — 49560: CPT

## 2020-01-28 PROCEDURE — 49560: CPT | Mod: AS

## 2020-01-28 RX ORDER — FENTANYL CITRATE 50 UG/ML
50 INJECTION INTRAVENOUS ONCE
Refills: 0 | Status: DISCONTINUED | OUTPATIENT
Start: 2020-01-28 | End: 2020-01-28

## 2020-01-28 RX ORDER — FENTANYL CITRATE 50 UG/ML
25 INJECTION INTRAVENOUS
Refills: 0 | Status: DISCONTINUED | OUTPATIENT
Start: 2020-01-28 | End: 2020-01-28

## 2020-01-28 RX ORDER — SODIUM CHLORIDE 9 MG/ML
1000 INJECTION, SOLUTION INTRAVENOUS
Refills: 0 | Status: DISCONTINUED | OUTPATIENT
Start: 2020-01-28 | End: 2020-01-28

## 2020-01-28 RX ADMIN — FENTANYL CITRATE 50 MICROGRAM(S): 50 INJECTION INTRAVENOUS at 14:00

## 2020-01-28 RX ADMIN — FENTANYL CITRATE 50 MICROGRAM(S): 50 INJECTION INTRAVENOUS at 13:40

## 2020-01-28 RX ADMIN — SODIUM CHLORIDE 75 MILLILITER(S): 9 INJECTION, SOLUTION INTRAVENOUS at 13:30

## 2020-01-28 NOTE — ASU PATIENT PROFILE, ADULT - PSH
H/O mastoidectomy  1998  H/O syphilis    History of MRSA infection    S/P arthroscopic surgery of right knee  1992  S/P wrist surgery  right 1985

## 2020-01-28 NOTE — ASU DISCHARGE PLAN (ADULT/PEDIATRIC) - CALL YOUR DOCTOR IF YOU HAVE ANY OF THE FOLLOWING:
Swelling that gets worse/Bleeding that does not stop/Fever greater than (need to indicate Fahrenheit or Celsius)/Wound/Surgical Site with redness, or foul smelling discharge or pus/Unable to urinate

## 2020-01-28 NOTE — ASU PATIENT PROFILE, ADULT - ABILITY TO HEAR (WITH HEARING AID OR HEARING APPLIANCE IF NORMALLY USED):
right earing no hearing left okay/Mildly to Moderately Impaired: difficulty hearing in some environments or speaker may need to increase volume or speak distinctly

## 2020-01-28 NOTE — ASU PATIENT PROFILE, ADULT - PMH
Anxiety    Elephantiasis nostras verrucosa    Hepatitis B    Hepatitis B infection without delta agent without hepatic coma, unspecified chronicity    HIV (human immunodeficiency virus infection)    Kaposi's sarcoma of skin  s/p chemo 11/2016 - 5/2017 and 11/ 2017 - 4/2018  Kaposi's sarcoma of skin    Substance abuse  last use 2005

## 2020-01-30 DIAGNOSIS — Z21 ASYMPTOMATIC HUMAN IMMUNODEFICIENCY VIRUS [HIV] INFECTION STATUS: ICD-10-CM

## 2020-01-30 DIAGNOSIS — F41.9 ANXIETY DISORDER, UNSPECIFIED: ICD-10-CM

## 2020-01-30 DIAGNOSIS — K42.0 UMBILICAL HERNIA WITH OBSTRUCTION, WITHOUT GANGRENE: ICD-10-CM

## 2020-01-30 DIAGNOSIS — Z86.19 PERSONAL HISTORY OF OTHER INFECTIOUS AND PARASITIC DISEASES: ICD-10-CM

## 2020-01-30 DIAGNOSIS — Z87.891 PERSONAL HISTORY OF NICOTINE DEPENDENCE: ICD-10-CM

## 2020-02-04 ENCOUNTER — RX RENEWAL (OUTPATIENT)
Age: 45
End: 2020-02-04

## 2020-02-04 ENCOUNTER — TRANSCRIPTION ENCOUNTER (OUTPATIENT)
Age: 45
End: 2020-02-04

## 2020-02-05 ENCOUNTER — TRANSCRIPTION ENCOUNTER (OUTPATIENT)
Age: 45
End: 2020-02-05

## 2020-02-05 ENCOUNTER — RX RENEWAL (OUTPATIENT)
Age: 45
End: 2020-02-05

## 2020-02-06 ENCOUNTER — TRANSCRIPTION ENCOUNTER (OUTPATIENT)
Age: 45
End: 2020-02-06

## 2020-02-10 ENCOUNTER — APPOINTMENT (OUTPATIENT)
Dept: SURGERY | Facility: CLINIC | Age: 45
End: 2020-02-10
Payer: MEDICARE

## 2020-02-10 VITALS
HEIGHT: 68 IN | WEIGHT: 200 LBS | BODY MASS INDEX: 30.31 KG/M2 | DIASTOLIC BLOOD PRESSURE: 90 MMHG | SYSTOLIC BLOOD PRESSURE: 143 MMHG | HEART RATE: 80 BPM | TEMPERATURE: 97.7 F

## 2020-02-10 PROCEDURE — 99024 POSTOP FOLLOW-UP VISIT: CPT

## 2020-02-22 ENCOUNTER — OUTPATIENT (OUTPATIENT)
Dept: OUTPATIENT SERVICES | Facility: HOSPITAL | Age: 45
LOS: 1 days | Discharge: HOME | End: 2020-02-22

## 2020-02-22 DIAGNOSIS — Z86.19 PERSONAL HISTORY OF OTHER INFECTIOUS AND PARASITIC DISEASES: Chronic | ICD-10-CM

## 2020-02-22 DIAGNOSIS — Z98.890 OTHER SPECIFIED POSTPROCEDURAL STATES: Chronic | ICD-10-CM

## 2020-02-22 DIAGNOSIS — Z86.14 PERSONAL HISTORY OF METHICILLIN RESISTANT STAPHYLOCOCCUS AUREUS INFECTION: Chronic | ICD-10-CM

## 2020-02-22 DIAGNOSIS — Z90.89 ACQUIRED ABSENCE OF OTHER ORGANS: Chronic | ICD-10-CM

## 2020-02-22 DIAGNOSIS — C46.0 KAPOSI'S SARCOMA OF SKIN: ICD-10-CM

## 2020-02-26 ENCOUNTER — APPOINTMENT (OUTPATIENT)
Dept: HEMATOLOGY ONCOLOGY | Facility: CLINIC | Age: 45
End: 2020-02-26

## 2020-02-26 ENCOUNTER — APPOINTMENT (OUTPATIENT)
Dept: HEMATOLOGY ONCOLOGY | Facility: CLINIC | Age: 45
End: 2020-02-26
Payer: MEDICARE

## 2020-02-26 VITALS
BODY MASS INDEX: 32.49 KG/M2 | SYSTOLIC BLOOD PRESSURE: 112 MMHG | RESPIRATION RATE: 16 BRPM | DIASTOLIC BLOOD PRESSURE: 71 MMHG | OXYGEN SATURATION: 98 % | HEIGHT: 67.32 IN | WEIGHT: 209.44 LBS | HEART RATE: 75 BPM | TEMPERATURE: 97.5 F

## 2020-02-26 PROCEDURE — 99205 OFFICE O/P NEW HI 60 MIN: CPT

## 2020-02-28 NOTE — HISTORY OF PRESENT ILLNESS
[AJCC Stage: ____] : AJCC Stage: [unfilled] [Disease: _____________________] : Disease: [unfilled] [Cycle: ___] : Cycle: [unfilled] [de-identified] : 45 M seen with life threatening presentation in 10/2016, of AIDS and complicating infections. His KS has come to the fore in the weeks after his diagnosis and treatment affecting RLE, LLE, lungs at a minimum. He received one cycle of PLD/Doxil in hospital with improvement, and received 6 cycles with some improvement in disease. He has been stable off two courses of PLD/Doxil Rx save for recurrent cellulitis. \par \par He is seen for advice, opinion on further management. \par \par HPI:\par \par 2016: 10-15 lb weight loss, night sweats, dysphagia. Notes he recalls little or nothing of the last two weeks prior to hospitalization, being taken by water ambulance from [place] to hospital \par \par 10/5/2016-10/16/2016: Admit [facility] with respiratory failure, initial diagnosis of AIDS. CF4 = 4, CF4/8 = 0/12, HIV RNA VL = 91201. Recalls tested negative in , and states was negative by Oraquick test in 2016. Admitted with WBC 1.2, Hb 11.6, , Na 123, Creat 1.33, ABG 7.52/28/56, with CXR with opacity in RLL. Rx abx, Rx Bipap and care in MICU. LEFT and RIGHT  leg swelling noted, U/S negative for clot. CT angio showed no PE. Multifocal pneumonia noted on CT. Found with enterovirus, rhinovirus, RSV positive, and HIV positive. Cx showed S. pneumonia. AFB negative in sputa to date. Hep B also found positive, viral load 98 IU/mL.  U/S showed gallstones but no cholecystitis. Also positive for oropharyngeal thrus.  Found also with severe protein chen malnutrition, suggested supplements.  \par \par 10/22/2016: Readmit with increasing RIGHT lower extremity pain and swelling. Multifocal lesions noted worsening over shin > posteriorly. RIGHT foot with substantial edema causing continuous unbearable pain.  \par \par 10/31/2016: Eventually Bx skin lesions showing KS. Rx x 1 PLD/Doxil before DC to rehab, which per patient and mom's report had been an utter disaster in terms of transition of care with medications and diagnoses incorrect at multiple junctures during stay. \par \par 16 : Reports R lower extremity swelling is much improved.  R lower extremity feels better, no open lesions. No other lesions noted. He is improving in terms of activity and now at home after being DC'd from rehab.\par \par 16 : Cycle 3 D 1 of PLD Dox. Reports felling well and his R leg is much improved after his 2nd cycle. Today his WBC is 2.7 and ANC of 300. He reports that he had an episode of fever on 16, 101F but did not report. He has not had any fevers since then and does not have chills, malaise or other S/S of infection. \par PLD/DOX held for 2 weeks for recovery.\par \par : Was seen by Dr. Alonzo at ID clinic for AIDS and Hep B. DDI noted with PLD/DOX and Tenofovir (CY inhibition) in [facility]. Tenofovir being changed to Descovy and Tivicay in order to avoid drug-interactions. This may have prolonged neutropenia and PLD/Dox to stay longer. His PLD/DOX is dose reduced to 35 mg/m2 today as well. Today's CBC+diff is pending. \par \par 17: Cycle 4 today.  Cycle 3 dose reduced to 35 mg/m2 secondary to NTP and HIV medication was changed. Abscess under R axilla went to urgent center. Was was on clindamycin for 7 days(11). Now completely resolved.   He would like to go to a "salt steam house" for relaxation and breathing. \par \par 3/16/17: Here for cycle 5 of PLDox. He had another abscess under his right arm, completed clindamycin x 7 days. Continues to be followed closely with Dr. Alonzo(ID) for HIV. \par Weight gain on therapy, otherwise feels well. \par \par 17: Nolan comes in for cycle 6 of therapy. He had difficult time with his last cycle fatigue which lasted for about 4 days. He is fully recovered and feels well. He has gained a considerable amount of weight since starting therapy. Now moved back to [place] and walked 8 miles yesterday. No SOB, JULIEN, pain. \par \par 17 : Nolan is here for a follow up. He had CT chest on 5/17/17 showing significant improvement.  He reports R leg swelling worsening over a week and there is a small cut in the anterior portion 3 mm, oozing serous fluid and contralateral leg edema for 2 weeks with some read patches along the anterior portion of the shin, tender to palpate. No identifiable injury on the skin.  Saw Dr. Silva for follow up this morning. Currently on Descovy and Tivicay for HIV.\par \par 17 : Nolan comes  in today for a follow up. Completed 6 cycles of PLD/Doxil in  and is back in [facility]. Has had erythematous bilateral LE with edema on his last visit, we decided to monitor rather than biopsy and additional tests.   \par \par 10/6/17: Here for a follow up. He had acute hep A with elevated LFTs in September. He had fatigue at that time and refused to go to the ED as he was on [place]. His blood work from ID showed elevated LFTs.  CT chest today - results not on  as of 12 noon. He also notes pain in the sole of the foot and increased edema causing pain.\par \par 10/31/17: Nolan's LEFT leg and foot erythema and edema has improved only slightly since our last visit with ABX and now here for further discussion regarding management of KS complicating AIDS, with substantial symptoms from the damage the tumor has done to the soft tissue of the right > left lower extremity. CT chest showed stable changes with small pulmonary nodules and hypertension. Taking tramadol for pain. Pain in rated 3 on the tramadol. \par \par 2018:  Recent erythema right LE again. This dissipated within a few days. There was no warmth or erythema to suggest a cellulitis.  He notes increased xerosis and bumpiness to the skin and is out of triamcinolone and urea creams. He noted "PMS like symptoms" a day or two after chemotherapy, alleviated with acupuncture but likely related to pre treatment dexamethasone. \par \par 18 : Here for a follow up and for cycle 10 of PLD/Dox. He is due to go back to [place] to the summer after this Rx. He had substantial agitation at his ID visit, and was referred to a psychiatrist, who Rx clonazepam 0.5 bid with good effect. He had heartburn and agitation after last cycle of Rx, ? related to dexamethasone. He has continued leg pain and has heard of cannabis oil for the leg for pain. 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 His leg looks better than it has since diagnosis, however. He still has occasional pain and has skin issues such as cellulitis from time to time.  \par \par 12/3/2018: RTC feeling well, had tick bite this summer but no cellulitis. Still with anxiety disorder but finds present Rx with clonazepam and zolpidem helping overall. Occ respiratory issues with cold weather coming on, and also has some muscle fatigue / cramping in legs after working at the end of a day. \par \par 06/10/2019: Has baseline dyspnea with exertion and peripheral edema, but doing relatively well. He has done well with lymphedema therapy with compression stockings and other intervention since his surgical visit 2019. Skin is still friable and prone to ooze or bleed with injury but there is no violaceous recurrence to suggest KS recurrence. \par \par 19 : Here for a follow up. Doing better overall with lymphedema therapy, but still with residual scarring of legs / chronic edematous changes in RLE > LLE. A couple of mosquito bites have appeared to turn into early cellulitis over the summer but he has managed with topical Abx. \par \par He is now seen for advice, opinion on further management [de-identified] : By far most disease over right shin area, circumferentially around the lower calf. only scattered lesions elsewhere. About 20 cm (craniocaudal) x 10 cm of confluent tumor [FreeTextEntry1] : PLD/DOX every 28 days x 6 cycles, last cycle 4/20/17 before resuming with a new course later in 2017; now on observation only since spring 2018

## 2020-02-28 NOTE — REASON FOR VISIT
[Follow-Up Visit] : a follow-up [Parent] : parent [FreeTextEntry2] : HIV-associated Kaposi sarcoma, mostly affecting right lower extremity, causing damage to the soft tissue of the right lower extremity.

## 2020-02-28 NOTE — REVIEW OF SYSTEMS
[Anxiety] : anxiety [Negative] : Allergic/Immunologic [Recent Change In Weight] : ~T recent weight change [SOB on Exertion] : shortness of breath during exertion [Fever] : no fever [Chills] : no chills [Fatigue] : no fatigue [Night Sweats] : no night sweats [Eye Pain] : no eye pain [Red Eyes] : eyes not red [Dry Eyes] : no dryness of the eyes [Dysphagia] : no dysphagia [Vision Problems] : no vision problems [Loss of Hearing] : no loss of hearing [Nosebleeds] : no nosebleeds [Hoarseness] : no hoarseness [Mucosal Pain] : no mucosal pain [Odynophagia] : no odynophagia [Chest Pain] : no chest pain [Palpitations] : no palpitations [Leg Claudication] : no intermittent leg claudication [Shortness Of Breath] : no shortness of breath [Lower Ext Edema] : no lower extremity edema [Abdominal Pain] : no abdominal pain [Cough] : no cough [Vomiting] : no vomiting [Constipation] : no constipation [Diarrhea] : no diarrhea [Dysuria] : no dysuria [Incontinence] : no incontinence [Joint Pain] : no joint pain [Muscle Pain] : no muscle pain [Joint Stiffness] : no joint stiffness [Confused] : no confusion [Difficulty Walking] : no difficulty walking [Fainting] : no fainting [Dizziness] : no dizziness [Suicidal] : not suicidal [Insomnia] : no insomnia [Depression] : no depression [Proptosis] : no proptosis [Hot Flashes] : no hot flashes [Muscle Weakness] : no muscle weakness [Easy Bleeding] : no tendency for easy bleeding [Deepening Of The Voice] : no deepening of the voice [Swollen Glands] : no swollen glands [Easy Bruising] : no tendency for easy bruising [FreeTextEntry6] : Bad hiccups after dexamethasone Rx [de-identified] : RLE still discolored but better moisturized and more supple (uses baby oil), no obviously active lesions at present. Cellulitis resolved. [FreeTextEntry2] : weight gain

## 2020-02-28 NOTE — ASSESSMENT
[Palliative Care Plan] : not applicable at this time [Supportive] : Goals of care discussed with patient: Supportive [FreeTextEntry1] : Imp/Plan: Complex 44 M with AIDS and KS as principal complicating issue. He is doing very well 2 years + since diagnosis. \par \par Responding to PLD/DOX however  after cycle 2 and cycle 3 delayed x 2 weeks; this was due to an interaction with his HIV medication. Completed 6 cycles. Worse clinically so he restarted late 2017 and finished another 6 cycles.  He had changed his ART so does not need pegfilgrastim to help boost blood counts. \par \par --KS ; No disease worsening overall. Imaging of chest shows improvement overall as of 11/2019. \par \par Hold on further RX, RTC 6 mo in follow up - sometime in early May 2019, no scans, just clinical follow up since he gets labs through his ID MD.  \par \par -- Consider pazopanib vs lenalidomide vs trial if disease worse.\par \par AIDS/Hep B: Continue close follow up with Dr Rouse in I.D.\par \par Pain: continue Tramadol as needed for the time being.

## 2020-02-28 NOTE — HISTORY OF PRESENT ILLNESS
[Disease: _____________________] : Disease: [unfilled] [AJCC Stage: ____] : AJCC Stage: [unfilled] [Cycle: ___] : Cycle: [unfilled] [6 - Distress Level] : Distress Level: 6 [Date: ____________] : Patient's last distress assessment performed on [unfilled]. [de-identified] : 44 year old male presenting to [facility] for oncologic care. He is formerly under the care of Dr. Esequiel Durbin (medical oncology). Patient was seen with life threatening presentation in 10/2016, of AIDS and complicating infections. His KS has come to the fore in the weeks after his diagnosis and treatment affecting RLE, LLE, lungs at a minimum. \par \par 2016: 10-15 lb weight loss, night sweats, dysphagia. Notes he recalls little or nothing of the last two weeks prior to hospitalization, being taken by water ambulance from [place] to hospital \par \par 10/5/2016-10/16/2016: Admit [facility] with respiratory failure, initial diagnosis of AIDS. CF4 = 4, CF4/8 = 0/12, HIV RNA VL = 24742. Recalls tested negative in , and states was negative by Oraquick test in 2016. Admitted with WBC 1.2, Hb 11.6, , Na 123, Creat 1.33, ABG 7.52/28/56, with CXR with opacity in RLL. Rx abx, Rx Bipap and care in MICU. LEFT and RIGHT  leg swelling noted, U/S negative for clot. CT angio showed no PE. Multifocal pneumonia noted on CT. Found with enterovirus, rhinovirus, RSV positive, and HIV positive. Cx showed S. pneumonia. AFB negative in sputa to date. Hep B also found positive, viral load 98 IU/mL.  U/S showed gallstones but no cholecystitis. Also positive for oropharyngeal thrus.  Found also with severe protein chen malnutrition, suggested supplements.  \par \par 10/22/2016: Readmit with increasing RIGHT lower extremity pain and swelling. Multifocal lesions noted worsening over shin > posteriorly. RIGHT foot with substantial edema causing continuous unbearable pain.  \par \par 10/31/2016: Eventually Bx skin lesions showing KS. Rx x 1 PLD/Doxil before DC to rehab, which per patient and mom's report had been an utter disaster in terms of transition of care with medications and diagnoses incorrect at multiple junctures during stay. \par \par 16 : Reports R lower extremity swelling is much improved.  R lower extremity feels better, no open lesions. No other lesions noted. He is improving in terms of activity and now at home after being DC'd from rehab.\par \par 16 : Cycle 3 D 1 of PLD Dox. Reports felling well and his R leg is much improved after his 2nd cycle. Today his WBC is 2.7 and ANC of 300. He reports that he had an episode of fever on 16, 101F but did not report. He has not had any fevers since then and does not have chills, malaise or other S/S of infection. \par PLD/DOX held for 2 weeks for recovery.\par \par : Was seen by Dr. Alonzo at ID clinic for AIDS and Hep B. DDI noted with PLD/DOX and Tenofovir (CY inhibition) in [place]. Tenofovir being changed to Descovy and Tivicay in order to avoid drug-interactions. This may have prolonged neutropenia and PLD/Dox to stay longer. His PLD/DOX is dose reduced to 35 mg/m2 today as well. Today's CBC+diff is pending. \par \par 17: Cycle 4 today.  Cycle 3 dose reduced to 35 mg/m2 secondary to NTP and HIV medication was changed. Abscess under R axilla went to urgent center. Was was on clindamycin for 7 days(11). Now completely resolved.   He would like to go to a "salt steam house" for relaxation and breathing. \par \par 3/16/17: Here for cycle 5 of PLDox. He had another abscess under his right arm, completed clindamycin x 7 days. Continues to be followed closely with Dr. Alonzo(ID) for HIV. \par Weight gain on therapy, otherwise feels well. \par \par 17: Nolan comes in for cycle 6 of therapy. He had difficult time with his last cycle fatigue which lasted for about 4 days. He is fully recovered and feels well. He has gained a considerable amount of weight since starting therapy. Now moved back to [place] and walked 8 miles yesterday. No SOB, JULIEN, pain. \par \par 17 : Nolan is here for a follow up. He had CT chest on 17 showing significant improvement.  He reports R leg swelling worsening over a week and there is a small cut in the anterior portion 3 mm, oozing serous fluid and contralateral leg edema for 2 weeks with some read patches along the anterior portion of the shin, tender to palpate. No identifiable injury on the skin.  Saw Dr. Silva for follow up this morning. Currently on Descovy and Tivicay for HIV.\par \par 17 : Nolan comes  in today for a follow up. Completed 6 cycles of PLD/Doxil in  and is back in [facility]. Has had erythematous bilateral LE with edema on his last visit, we decided to monitor rather than biopsy and additional tests.   \par \par 10/6/17: Here for a follow up. He had acute hep A with elevated LFTs in September. He had fatigue at that time and refused to go to the ED as he was on [place]. His blood work from ID showed elevated LFTs.  CT chest today - results not on  as of 12 noon. He also notes pain in the sole of the foot and increased edema causing pain.\par \par 10/31/17: Nolan's LEFT leg and foot erythema and edema has improved only slightly since our last visit with ABX and now here for further discussion regarding management of KS complicating AIDS, with substantial symptoms from the damage the tumor has done to the soft tissue of the right > left lower extremity. CT chest showed stable changes with small pulmonary nodules and hypertension. Taking tramadol for pain. Pain in rated 3 on the tramadol. \par \par 2018:  Recent erythema right LE again. This dissipated within a few days. There was no warmth or erythema to suggest a cellulitis.  He notes increased xerosis and bumpiness to the skin and is out of triamcinolone and urea creams. He noted "PMS like symptoms" a day or two after chemotherapy, alleviated with acupuncture but likely related to pre treatment dexamethasone. \par \par 18 : Here for a follow up and for cycle 10 of PLD/Dox. He is due to go back to [place] to the summer after this Rx. He had substantial agitation at his ID visit, and was referred to a psychiatrist, who Rx clonazepam 0.5 bid with good effect. He had heartburn and agitation after last cycle of Rx, ? related to dexamethasone. He has continued leg pain and has heard of cannabis oil for the leg for pain. 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 His leg looks better than it has since diagnosis, however. He still has occasional pain and has skin issues such as cellulitis from time to time.  \par \par 12/3/2018: RTC feeling well, had tick bite this summer but no cellulitis. Still with anxiety disorder but finds present Rx with clonazepam and zolpidem helping overall. Occ respiratory issues with cold weather coming on, and also has some muscle fatigue / cramping in legs after working at the end of a day. \par \par 06/10/2019: Has baseline dyspnea with exertion and peripheral edema, but doing relatively well. He has done well with lymphedema therapy with compression stockings and other intervention since his surgical visit 2019. Skin is still friable and prone to ooze or bleed with injury but there is no violaceous recurrence to suggest KS recurrence. \par \par 19 : Here for a follow up. Doing better overall with lymphedema therapy, but still with residual scarring of legs / chronic edematous changes in RLE > LLE. A couple of mosquito bites have appeared to turn into early cellulitis over the summer but he has managed with topical Abx. \par \par He is now seen for advice, opinion on further management [de-identified] : By far most disease over right shin area, circumferentially around the lower calf. only scattered lesions elsewhere. About 20 cm (craniocaudal) x 10 cm of confluent tumor [de-identified] : Patient recently underwent a ventral hernia repair by Dr. Sebastian Moreno approximately 3 weeks ago; he continues to recover from surgery.  [FreeTextEntry1] : PLD/DOX every 28 days x 6 cycles (November 2016 - 4/20/2017), PLD/DOX every 28 days x 6 cycles (11/9/2017 - 4/5/2018); currently on surveillance [FreeTextEntry7] : he is seeing counseling service at the immune therapy clinic HIV care

## 2020-02-28 NOTE — REASON FOR VISIT
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

## 2020-02-28 NOTE — ASSESSMENT
[Supportive] : Goals of care discussed with patient: Supportive [Palliative Care Plan] : not applicable at this time [FreeTextEntry1] : Nolan Catherine is a 44 year old male with past medical history of AIDS presents to [facility] for oncologic care of KS (primarily of right lower extremity). Despite his reported symptoms, he appeared well today; his last chemotherapy treatment was completed on 4/5/2018. \par \par Plan to repeat CT chest in May 2020 to rule out disease progression\par No pulmonary symptoms noted and the patient has stated that he does not wish to receive chemotherapy treatment. He has anxiety and he is followed up in the immune therapy clinic by Dr Reese\par He will follow up with infectious disease as directed; remains on antiretroviral medication.\par He requests medication for sleep disturbance. I would advise that he uses herbal teas and would not recommend chronic Ambien use.\par He asks for pain medication; again I am concerned about continued use of opiates and opioids. He has stable disease in  his leg and lung and is ambulatory. I would renew a limited quantify of tramadol\par \par Return to the office in 3 months. Seen in conjunction with Nitin MARKS. \par

## 2020-02-28 NOTE — PHYSICAL EXAM
[Restricted in physically strenuous activity but ambulatory and able to carry out work of a light or sedentary nature] : Status 1- Restricted in physically strenuous activity but ambulatory and able to carry out work of a light or sedentary nature, e.g., light house work, office work [Normal] : grossly intact [de-identified] : Brawny RLE > LLE  lymphedema as noted below post damage from KS [de-identified] : pain still  3-4 / 10  11/2019 [de-identified] : R leg with less xerosis, corrugated aspect of the calf and shin with baby oil and other emollients.  [de-identified] : 1+ edema to mid thigh on right; limited ROM from pain right foot, +1 L lower leg edema, no erythema noted. (+) xerosis over same area [de-identified] : Still with anxiety issues, ? increasing agoraphobia

## 2020-02-28 NOTE — END OF VISIT
[>50% of Time Spent on Counseling and Coordination of Care for  ___] : Greater than 50% of the encounter time was spent on counseling and coordination of care for [unfilled] [Time Spent: ___ minutes] : I have spent [unfilled] minutes of face to face time with the patient [FreeTextEntry3] : n/a - MD visit only today

## 2020-02-28 NOTE — PHYSICAL EXAM
[Restricted in physically strenuous activity but ambulatory and able to carry out work of a light or sedentary nature] : Status 1- Restricted in physically strenuous activity but ambulatory and able to carry out work of a light or sedentary nature, e.g., light house work, office work [Normal] : grossly intact [de-identified] : Brawny RLE > LLE  lymphedema as noted below post damage from KS [de-identified] : R leg with less xerosis, corrugated aspect of the calf and shin with baby oil and other emollients.  [de-identified] : pain still  3-4 / 10  11/2019 [de-identified] : 1+ edema to mid thigh on right; limited ROM from pain right foot, +1 L lower leg edema, no erythema noted. (+) xerosis over same area [de-identified] : Still with anxiety issues, ? increasing agoraphobia

## 2020-02-28 NOTE — REVIEW OF SYSTEMS
[Anxiety] : anxiety [Negative] : Allergic/Immunologic [FreeTextEntry2] : Working full time with limitations secondary to the pain and swelling of both legs.  [Night Sweats] : no night sweats [FreeTextEntry6] : Bad hiccups after dexamethasone Rx [de-identified] : RLE still discolored but better moisturized and more supple (uses baby oil), no obviously active lesions at present. Cellulitis resolved.

## 2020-03-03 ENCOUNTER — LABORATORY RESULT (OUTPATIENT)
Age: 45
End: 2020-03-03

## 2020-03-03 ENCOUNTER — APPOINTMENT (OUTPATIENT)
Dept: INFECTIOUS DISEASE | Facility: CLINIC | Age: 45
End: 2020-03-03
Payer: MEDICARE

## 2020-03-03 VITALS
HEIGHT: 67 IN | SYSTOLIC BLOOD PRESSURE: 138 MMHG | BODY MASS INDEX: 32.8 KG/M2 | RESPIRATION RATE: 18 BRPM | OXYGEN SATURATION: 97 % | DIASTOLIC BLOOD PRESSURE: 86 MMHG | TEMPERATURE: 97.3 F | HEART RATE: 98 BPM | WEIGHT: 209 LBS

## 2020-03-03 DIAGNOSIS — Z78.9 OTHER SPECIFIED HEALTH STATUS: ICD-10-CM

## 2020-03-03 PROCEDURE — 99214 OFFICE O/P EST MOD 30 MIN: CPT | Mod: 25

## 2020-03-03 PROCEDURE — 90739 HEPB VACC 2/4 DOSE ADULT IM: CPT

## 2020-03-03 PROCEDURE — G0010: CPT

## 2020-03-03 NOTE — ASSESSMENT
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

## 2020-03-03 NOTE — REVIEW OF SYSTEMS
[Limb Pain] : limb pain [Negative] : Heme/Lymph [___ # of Missed Doses in The Past Week] : [unfilled] doses missed in the past week  [FreeTextEntry7] : GERD, dysphagoa

## 2020-03-03 NOTE — PHYSICAL EXAM
[General Appearance - Alert] : alert [General Appearance - In No Acute Distress] : in no acute distress [Sclera] : the sclera and conjunctiva were normal [PERRL With Normal Accommodation] : pupils were equal in size, round, reactive to light [Extraocular Movements] : extraocular movements were intact [Outer Ear] : the ears and nose were normal in appearance [Oropharynx] : the oropharynx was normal with no thrush [Neck Appearance] : the appearance of the neck was normal [Neck Cervical Mass (___cm)] : no neck mass was observed [Thyroid Diffuse Enlargement] : the thyroid was not enlarged [Jugular Venous Distention Increased] : there was no jugular-venous distention [Auscultation Breath Sounds / Voice Sounds] : lungs were clear to auscultation bilaterally [Heart Rate And Rhythm] : heart rate was normal and rhythm regular [Heart Sounds] : normal S1 and S2 [Heart Sounds Gallop] : no gallops [Murmurs] : no murmurs [Heart Sounds Pericardial Friction Rub] : no pericardial rub [Full Pulse] : the pedal pulses are present [Edema] : there was no peripheral edema [Bowel Sounds] : normal bowel sounds [Abdomen Soft] : soft [Abdomen Tenderness] : non-tender [Abdomen Mass (___ Cm)] : no abdominal mass palpated [Costovertebral Angle Tenderness] : no CVA tenderness [No Palpable Adenopathy] : no palpable adenopathy [Musculoskeletal - Swelling] : no joint swelling [Motor Tone] : muscle strength and tone were normal [Nail Clubbing] : no clubbing  or cyanosis of the fingernails [] : no rash [Skin Color & Pigmentation] : normal skin color and pigmentation [FreeTextEntry1] : Thick, hyperpigmentation in RLE, uinchanged, no warmth [Deep Tendon Reflexes (DTR)] : deep tendon reflexes were 2+ and symmetric [Sensation] : the sensory exam was normal to light touch and pinprick [Oriented To Time, Place, And Person] : oriented to person, place, and time [No Focal Deficits] : no focal deficits [Affect] : the affect was normal

## 2020-03-03 NOTE — HISTORY OF PRESENT ILLNESS
[Sexually Active] : The patient is sexually active [Monogamous] : monogamous [Men] : with men [Male ___] : [unfilled] male [FreeTextEntry1] : HIV doing well on Biktarvy\par Tolerating well\par No missed doses\par \par Weight gain, he feels he is not eating junk food\par Trying to give up diet coke\par He has been trying to eat well\par Also has heartburn\par Feels some dysphagia\par \par Mother had successful- had lobe of lung removed, recuperating.\par  [Condom Use] : not using condoms

## 2020-03-25 ENCOUNTER — APPOINTMENT (OUTPATIENT)
Dept: GASTROENTEROLOGY | Facility: CLINIC | Age: 45
End: 2020-03-25

## 2020-03-25 ENCOUNTER — RX RENEWAL (OUTPATIENT)
Age: 45
End: 2020-03-25

## 2020-03-26 ENCOUNTER — RX RENEWAL (OUTPATIENT)
Age: 45
End: 2020-03-26

## 2020-04-27 ENCOUNTER — RX RENEWAL (OUTPATIENT)
Age: 45
End: 2020-04-27

## 2020-04-29 ENCOUNTER — TRANSCRIPTION ENCOUNTER (OUTPATIENT)
Age: 45
End: 2020-04-29

## 2020-04-29 RX ORDER — ZOLPIDEM TARTRATE 10 MG/1
10 TABLET ORAL
Qty: 30 | Refills: 0 | Status: ACTIVE | COMMUNITY
Start: 2017-12-07 | End: 1900-01-01

## 2020-04-30 ENCOUNTER — TRANSCRIPTION ENCOUNTER (OUTPATIENT)
Age: 45
End: 2020-04-30

## 2020-06-08 ENCOUNTER — OUTPATIENT (OUTPATIENT)
Dept: OUTPATIENT SERVICES | Facility: HOSPITAL | Age: 45
LOS: 1 days | Discharge: HOME | End: 2020-06-08

## 2020-06-08 DIAGNOSIS — Z86.19 PERSONAL HISTORY OF OTHER INFECTIOUS AND PARASITIC DISEASES: Chronic | ICD-10-CM

## 2020-06-08 DIAGNOSIS — C46.9 KAPOSI'S SARCOMA, UNSPECIFIED: ICD-10-CM

## 2020-06-08 DIAGNOSIS — Z86.14 PERSONAL HISTORY OF METHICILLIN RESISTANT STAPHYLOCOCCUS AUREUS INFECTION: Chronic | ICD-10-CM

## 2020-06-08 DIAGNOSIS — Z98.890 OTHER SPECIFIED POSTPROCEDURAL STATES: Chronic | ICD-10-CM

## 2020-06-08 DIAGNOSIS — Z90.89 ACQUIRED ABSENCE OF OTHER ORGANS: Chronic | ICD-10-CM

## 2020-06-10 ENCOUNTER — APPOINTMENT (OUTPATIENT)
Dept: HEMATOLOGY ONCOLOGY | Facility: CLINIC | Age: 45
End: 2020-06-10
Payer: MEDICARE

## 2020-06-10 PROCEDURE — 99215 OFFICE O/P EST HI 40 MIN: CPT | Mod: 95

## 2020-06-10 RX ORDER — DOXYCYCLINE HYCLATE 100 MG/1
100 CAPSULE ORAL
Qty: 2 | Refills: 0 | Status: DISCONTINUED | COMMUNITY
Start: 2018-05-31 | End: 2020-06-10

## 2020-06-11 NOTE — ASSESSMENT
[Supportive] : Goals of care discussed with patient: Supportive [Palliative Care Plan] : not applicable at this time [FreeTextEntry1] : Nolan Catherine is a 44 year old male with past medical history of AIDS presents to [facility] for oncologic care of KS (primarily of right lower extremity). Despite his reported symptoms, he appeared well today; his last chemotherapy treatment was completed on 4/5/2018. \par \par Plan to repeat CT chest in May 2020 to rule out disease progression\par No pulmonary symptoms noted and the patient has stated that he does not wish to receive chemotherapy treatment. He has anxiety and he is followed up in the immune therapy clinic by Dr Reese\par He will follow up with infectious disease as directed; remains on antiretroviral medication.\par He requests medication for sleep disturbance. I would advise that he uses herbal teas and would not recommend chronic Ambien use.\par He uses less pain medication; again I am concerned about continued use of opiates and opioids. He has stable disease in  his leg and lung and is ambulatory. He is not requesting additional tramadol\par He is used a TEMS to stimulate the skin\par Weight is now 207 lbs; he is walking more and his appetite is good\par \par Return to the office in July 2020 for review of CT scanning of the lungs. \par

## 2020-06-11 NOTE — HISTORY OF PRESENT ILLNESS
[Disease: _____________________] : Disease: [unfilled] [AJCC Stage: ____] : AJCC Stage: [unfilled] [Cycle: ___] : Cycle: [unfilled] [6 - Distress Level] : Distress Level: 6 [Other Location: e.g. School (Enter Location, City,State)___] : at [unfilled], at the time of the visit. [Medical Office: ([facility])___] : at the medical office located in  [Date: ____________] : Patient's last distress assessment performed on [unfilled]. [de-identified] : 45 year old male presenting to [facility] for oncologic care. He is formerly under the care of Dr. Esequiel Durbin (medical oncology). Patient was seen with life threatening presentation in 10/2016, of AIDS and complicating infections. His KS has come to the fore in the weeks after his diagnosis and treatment affecting RLE, LLE, lungs at a minimum. \par \par 2016: 10-15 lb weight loss, night sweats, dysphagia. Notes he recalls little or nothing of the last two weeks prior to hospitalization, being taken by water ambulance from [place] to hospital \par \par 10/5/2016-10/16/2016: Admit [facility] with respiratory failure, initial diagnosis of AIDS. CF4 = 4, CF4/8 = 0/12, HIV RNA VL = 91176. Recalls tested negative in , and states was negative by Oraquick test in 2016. Admitted with WBC 1.2, Hb 11.6, , Na 123, Creat 1.33, ABG 7.52/28/56, with CXR with opacity in RLL. Rx abx, Rx Bipap and care in MICU. LEFT and RIGHT  leg swelling noted, U/S negative for clot. CT angio showed no PE. Multifocal pneumonia noted on CT. Found with enterovirus, rhinovirus, RSV positive, and HIV positive. Cx showed S. pneumonia. AFB negative in sputa to date. Hep B also found positive, viral load 98 IU/mL.  U/S showed gallstones but no cholecystitis. Also positive for oropharyngeal thrush.  Found also with severe protein chen malnutrition, suggested supplements.  \par \par 10/22/2016: Readmit with increasing RIGHT lower extremity pain and swelling. Multifocal lesions noted worsening over shin > posteriorly. RIGHT foot with substantial edema causing continuous unbearable pain.  \par \par 10/31/2016: Eventually Bx skin lesions showing KS. Rx x 1 PLD/Doxil before DC to rehab, which per patient and mom's report had been an utter disaster in terms of transition of care with medications and diagnoses incorrect at multiple junctures during stay. \par \par 16 : Reports R lower extremity swelling is much improved.  R lower extremity feels better, no open lesions. No other lesions noted. He is improving in terms of activity and now at home after being DC'd from rehab.\par \par 16 : Cycle 3 D 1 of PLD Dox. Reports felling well and his R leg is much improved after his 2nd cycle. Today his WBC is 2.7 and ANC of 300. He reports that he had an episode of fever on 16, 101F but did not report. He has not had any fevers since then and does not have chills, malaise or other S/S of infection. \par PLD/DOX held for 2 weeks for recovery.\par \par : Was seen by Dr. Alonzo at ID clinic for AIDS and Hep B. DDI noted with PLD/DOX and Tenofovir (CY inhibition) in [place]. Tenofovir being changed to Descovy and Tivicay in order to avoid drug-interactions. This may have prolonged neutropenia and PLD/Dox to stay longer. His PLD/DOX is dose reduced to 35 mg/m2 today as well. Today's CBC+diff is pending. \par \par 17: Cycle 4 today.  Cycle 3 dose reduced to 35 mg/m2 secondary to NTP and HIV medication was changed. Abscess under R axilla went to urgent center. Was was on clindamycin for 7 days(11). Now completely resolved.   He would like to go to a "salt steam house" for relaxation and breathing. \par \par 3/16/17: Here for cycle 5 of PLDox. He had another abscess under his right arm, completed clindamycin x 7 days. Continues to be followed closely with Dr. Alonzo(ID) for HIV. \par Weight gain on therapy, otherwise feels well. \par \par 17: Nolan comes in for cycle 6 of therapy. He had difficult time with his last cycle fatigue which lasted for about 4 days. He is fully recovered and feels well. He has gained a considerable amount of weight since starting therapy. Now moved back to [place] and walked 8 miles yesterday. No SOB, JULIEN, pain. \par \par 17 : Nolan is here for a follow up. He had CT chest on 17 showing significant improvement.  He reports R leg swelling worsening over a week and there is a small cut in the anterior portion 3 mm, oozing serous fluid and contralateral leg edema for 2 weeks with some read patches along the anterior portion of the shin, tender to palpate. No identifiable injury on the skin.  Saw Dr. Silva for follow up this morning. Currently on Descovy and Tivicay for HIV.\par \par 17 : Nolan comes  in today for a follow up. Completed 6 cycles of PLD/Doxil in  and is back in [facility]. Has had erythematous bilateral LE with edema on his last visit, we decided to monitor rather than biopsy and additional tests.   \par \par 10/6/17: Here for a follow up. He had acute hep A with elevated LFTs in September. He had fatigue at that time and refused to go to the ED as he was on [place]. His blood work from ID showed elevated LFTs.  CT chest today - results not on  as of 12 noon. He also notes pain in the sole of the foot and increased edema causing pain.\par \par 10/31/17: Nolan's LEFT leg and foot erythema and edema has improved only slightly since our last visit with ABX and now here for further discussion regarding management of KS complicating AIDS, with substantial symptoms from the damage the tumor has done to the soft tissue of the right > left lower extremity. CT chest showed stable changes with small pulmonary nodules and hypertension. Taking tramadol for pain. Pain in rated 3 on the tramadol. \par \par 2018:  Recent erythema right LE again. This dissipated within a few days. There was no warmth or erythema to suggest a cellulitis.  He notes increased xerosis and bumpiness to the skin and is out of triamcinolone and urea creams. He noted "PMS like symptoms" a day or two after chemotherapy, alleviated with acupuncture but likely related to pre treatment dexamethasone. \par \par 18 : Here for a follow up and for cycle 10 of PLD/Dox. He is due to go back to [place] to the summer after this Rx. He had substantial agitation at his ID visit, and was referred to a psychiatrist, who Rx clonazepam 0.5 bid with good effect. He had heartburn and agitation after last cycle of Rx, ? related to dexamethasone. He has continued leg pain and has heard of cannabis oil for the leg for pain. 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 His leg looks better than it has since diagnosis, however. He still has occasional pain and has skin issues such as cellulitis from time to time.  \par \par 12/3/2018: RTC feeling well, had tick bite this summer but no cellulitis. Still with anxiety disorder but finds present Rx with clonazepam and zolpidem helping overall. Occ respiratory issues with cold weather coming on, and also has some muscle fatigue / cramping in legs after working at the end of a day. \par \par 06/10/2019: Has baseline dyspnea with exertion and peripheral edema, but doing relatively well. He has done well with lymphedema therapy with compression stockings and other intervention since his surgical visit 2019. Skin is still friable and prone to ooze or bleed with injury but there is no violaceous recurrence to suggest KS recurrence. \par \par 19 : Here for a follow up. Doing better overall with lymphedema therapy, but still with residual scarring of legs / chronic edematous changes in RLE > LLE. A couple of mosquito bites have appeared to turn into early cellulitis over the summer but he has managed with topical Abx. \par \par He is now seen for advice, opinion on further management [de-identified] : By far most disease over right shin area, circumferentially around the lower calf. only scattered lesions elsewhere. About 20 cm (craniocaudal) x 10 cm of confluent tumor [de-identified] : Patient recently underwent a ventral hernia repair by Dr. Sebastian Moreno approximately 3 weeks ago; he continues to recover from surgery.  [FreeTextEntry1] : PLD/DOX every 28 days x 6 cycles (November 2016 - 4/20/2017), PLD/DOX every 28 days x 6 cycles (11/9/2017 - 4/5/2018); currently on surveillance [FreeTextEntry7] : he is seeing counseling service at the immune therapy clinic HIV care

## 2020-06-11 NOTE — PHYSICAL EXAM
[Restricted in physically strenuous activity but ambulatory and able to carry out work of a light or sedentary nature] : Status 1- Restricted in physically strenuous activity but ambulatory and able to carry out work of a light or sedentary nature, e.g., light house work, office work [Normal] : grossly intact [Ulcers] : no ulcers [Mucositis] : no mucositis [Thrush] : no thrush [Vesicles] : no vesicles [de-identified] : as seen [de-identified] : as seen [de-identified] : as seen [de-identified] : as seen [de-identified] : as seen [de-identified] : as seen [de-identified] : 1+ edema to mid thigh on right; limited ROM from pain right foot, +1 L lower leg edema, no erythema noted. (+) xerosis over same area [de-identified] : Brawny RLE > LLE  lymphedema as noted below post damage from KS; as noted [de-identified] : R leg with less xerosis, corrugated aspect of the calf and shin with baby oil and other emollients.  [de-identified] : as seen [de-identified] : Still with anxiety issues, alleviated at beach scene

## 2020-06-11 NOTE — END OF VISIT
[Time Spent: ___ minutes] : I have spent [unfilled] minutes of time on the encounter. [>50% of the face to face encounter time was spent on counseling and/or coordination of care for ___] : Greater than 50% of the face to face encounter time was spent on counseling and/or coordination of care for [unfilled] [FreeTextEntry3] : n/a - MD visit only today

## 2020-06-11 NOTE — REVIEW OF SYSTEMS
[Recent Change In Weight] : ~T recent weight change [SOB on Exertion] : shortness of breath during exertion [Anxiety] : anxiety [Negative] : Allergic/Immunologic [Fever] : no fever [Chills] : no chills [Night Sweats] : no night sweats [Fatigue] : no fatigue [Eye Pain] : no eye pain [Dry Eyes] : no dryness of the eyes [Red Eyes] : eyes not red [Vision Problems] : no vision problems [Dysphagia] : no dysphagia [Loss of Hearing] : no loss of hearing [Nosebleeds] : no nosebleeds [Hoarseness] : no hoarseness [Mucosal Pain] : no mucosal pain [Odynophagia] : no odynophagia [Palpitations] : no palpitations [Chest Pain] : no chest pain [Leg Claudication] : no intermittent leg claudication [Lower Ext Edema] : no lower extremity edema [Shortness Of Breath] : no shortness of breath [Cough] : no cough [Abdominal Pain] : no abdominal pain [Vomiting] : no vomiting [Constipation] : no constipation [Diarrhea] : no diarrhea [Dysuria] : no dysuria [Incontinence] : no incontinence [Joint Pain] : no joint pain [Joint Stiffness] : no joint stiffness [Muscle Pain] : no muscle pain [Confused] : no confusion [Dizziness] : no dizziness [Difficulty Walking] : no difficulty walking [Fainting] : no fainting [Suicidal] : not suicidal [Insomnia] : no insomnia [Depression] : no depression [Hot Flashes] : no hot flashes [Proptosis] : no proptosis [Muscle Weakness] : no muscle weakness [Deepening Of The Voice] : no deepening of the voice [Easy Bleeding] : no tendency for easy bleeding [Easy Bruising] : no tendency for easy bruising [Swollen Glands] : no swollen glands [FreeTextEntry2] : weight gain [FreeTextEntry6] : Bad hiccups after dexamethasone Rx [de-identified] : RLE still discolored but better moisturized and more supple (uses baby oil), no obviously active lesions at present. Cellulitis resolved. [de-identified] : sight of beach and waves has helped calm anxiety

## 2020-06-11 NOTE — REASON FOR VISIT
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

## 2020-06-19 ENCOUNTER — RX RENEWAL (OUTPATIENT)
Age: 45
End: 2020-06-19

## 2020-07-15 ENCOUNTER — RX RENEWAL (OUTPATIENT)
Age: 45
End: 2020-07-15

## 2020-07-22 ENCOUNTER — RX RENEWAL (OUTPATIENT)
Age: 45
End: 2020-07-22

## 2020-07-28 ENCOUNTER — EMERGENCY (EMERGENCY)
Facility: HOSPITAL | Age: 45
LOS: 1 days | Discharge: HOME | End: 2020-07-28
Attending: EMERGENCY MEDICINE
Payer: MEDICARE

## 2020-07-28 VITALS
TEMPERATURE: 98 F | HEART RATE: 56 BPM | RESPIRATION RATE: 16 BRPM | SYSTOLIC BLOOD PRESSURE: 109 MMHG | DIASTOLIC BLOOD PRESSURE: 70 MMHG | OXYGEN SATURATION: 97 %

## 2020-07-28 VITALS
DIASTOLIC BLOOD PRESSURE: 74 MMHG | WEIGHT: 184.97 LBS | OXYGEN SATURATION: 96 % | TEMPERATURE: 99 F | HEART RATE: 83 BPM | SYSTOLIC BLOOD PRESSURE: 116 MMHG | RESPIRATION RATE: 16 BRPM | HEIGHT: 68 IN

## 2020-07-28 DIAGNOSIS — Z86.14 PERSONAL HISTORY OF METHICILLIN RESISTANT STAPHYLOCOCCUS AUREUS INFECTION: Chronic | ICD-10-CM

## 2020-07-28 DIAGNOSIS — Z98.890 OTHER SPECIFIED POSTPROCEDURAL STATES: Chronic | ICD-10-CM

## 2020-07-28 DIAGNOSIS — Z90.89 ACQUIRED ABSENCE OF OTHER ORGANS: Chronic | ICD-10-CM

## 2020-07-28 DIAGNOSIS — Z86.19 PERSONAL HISTORY OF OTHER INFECTIOUS AND PARASITIC DISEASES: Chronic | ICD-10-CM

## 2020-07-28 LAB
ALBUMIN SERPL ELPH-MCNC: 3.7 G/DL — SIGNIFICANT CHANGE UP (ref 3.3–5.2)
ALP SERPL-CCNC: 60 U/L — SIGNIFICANT CHANGE UP (ref 40–120)
ALT FLD-CCNC: 16 U/L — SIGNIFICANT CHANGE UP
ANION GAP SERPL CALC-SCNC: 9 MMOL/L — SIGNIFICANT CHANGE UP (ref 5–17)
AST SERPL-CCNC: 20 U/L — SIGNIFICANT CHANGE UP
BASOPHILS # BLD AUTO: 0.02 K/UL — SIGNIFICANT CHANGE UP (ref 0–0.2)
BASOPHILS NFR BLD AUTO: 0.4 % — SIGNIFICANT CHANGE UP (ref 0–2)
BILIRUB SERPL-MCNC: 0.5 MG/DL — SIGNIFICANT CHANGE UP (ref 0.4–2)
BUN SERPL-MCNC: 16 MG/DL — SIGNIFICANT CHANGE UP (ref 8–20)
CALCIUM SERPL-MCNC: 8.8 MG/DL — SIGNIFICANT CHANGE UP (ref 8.6–10.2)
CHLORIDE SERPL-SCNC: 102 MMOL/L — SIGNIFICANT CHANGE UP (ref 98–107)
CO2 SERPL-SCNC: 24 MMOL/L — SIGNIFICANT CHANGE UP (ref 22–29)
CREAT SERPL-MCNC: 0.94 MG/DL — SIGNIFICANT CHANGE UP (ref 0.5–1.3)
EOSINOPHIL # BLD AUTO: 0.29 K/UL — SIGNIFICANT CHANGE UP (ref 0–0.5)
EOSINOPHIL NFR BLD AUTO: 5.5 % — SIGNIFICANT CHANGE UP (ref 0–6)
GLUCOSE SERPL-MCNC: 94 MG/DL — SIGNIFICANT CHANGE UP (ref 70–99)
HCT VFR BLD CALC: 43.5 % — SIGNIFICANT CHANGE UP (ref 39–50)
HGB BLD-MCNC: 14.7 G/DL — SIGNIFICANT CHANGE UP (ref 13–17)
IMM GRANULOCYTES NFR BLD AUTO: 0.4 % — SIGNIFICANT CHANGE UP (ref 0–1.5)
LYMPHOCYTES # BLD AUTO: 1.16 K/UL — SIGNIFICANT CHANGE UP (ref 1–3.3)
LYMPHOCYTES # BLD AUTO: 21.9 % — SIGNIFICANT CHANGE UP (ref 13–44)
MCHC RBC-ENTMCNC: 30.8 PG — SIGNIFICANT CHANGE UP (ref 27–34)
MCHC RBC-ENTMCNC: 33.8 GM/DL — SIGNIFICANT CHANGE UP (ref 32–36)
MCV RBC AUTO: 91 FL — SIGNIFICANT CHANGE UP (ref 80–100)
MONOCYTES # BLD AUTO: 0.62 K/UL — SIGNIFICANT CHANGE UP (ref 0–0.9)
MONOCYTES NFR BLD AUTO: 11.7 % — SIGNIFICANT CHANGE UP (ref 2–14)
NEUTROPHILS # BLD AUTO: 3.19 K/UL — SIGNIFICANT CHANGE UP (ref 1.8–7.4)
NEUTROPHILS NFR BLD AUTO: 60.1 % — SIGNIFICANT CHANGE UP (ref 43–77)
PLATELET # BLD AUTO: 237 K/UL — SIGNIFICANT CHANGE UP (ref 150–400)
POTASSIUM SERPL-MCNC: 4.8 MMOL/L — SIGNIFICANT CHANGE UP (ref 3.5–5.3)
POTASSIUM SERPL-SCNC: 4.8 MMOL/L — SIGNIFICANT CHANGE UP (ref 3.5–5.3)
PROT SERPL-MCNC: 7.3 G/DL — SIGNIFICANT CHANGE UP (ref 6.6–8.7)
RBC # BLD: 4.78 M/UL — SIGNIFICANT CHANGE UP (ref 4.2–5.8)
RBC # FLD: 14 % — SIGNIFICANT CHANGE UP (ref 10.3–14.5)
SODIUM SERPL-SCNC: 135 MMOL/L — SIGNIFICANT CHANGE UP (ref 135–145)
WBC # BLD: 5.3 K/UL — SIGNIFICANT CHANGE UP (ref 3.8–10.5)
WBC # FLD AUTO: 5.3 K/UL — SIGNIFICANT CHANGE UP (ref 3.8–10.5)

## 2020-07-28 PROCEDURE — 71045 X-RAY EXAM CHEST 1 VIEW: CPT

## 2020-07-28 PROCEDURE — 99220: CPT

## 2020-07-28 PROCEDURE — 80053 COMPREHEN METABOLIC PANEL: CPT

## 2020-07-28 PROCEDURE — 85027 COMPLETE CBC AUTOMATED: CPT

## 2020-07-28 PROCEDURE — G0378: CPT

## 2020-07-28 PROCEDURE — 87040 BLOOD CULTURE FOR BACTERIA: CPT

## 2020-07-28 PROCEDURE — 96374 THER/PROPH/DIAG INJ IV PUSH: CPT

## 2020-07-28 PROCEDURE — 36415 COLL VENOUS BLD VENIPUNCTURE: CPT

## 2020-07-28 PROCEDURE — 71045 X-RAY EXAM CHEST 1 VIEW: CPT | Mod: 26

## 2020-07-28 PROCEDURE — 96376 TX/PRO/DX INJ SAME DRUG ADON: CPT

## 2020-07-28 PROCEDURE — 73630 X-RAY EXAM OF FOOT: CPT | Mod: 26,RT

## 2020-07-28 PROCEDURE — 99281 EMR DPT VST MAYX REQ PHY/QHP: CPT

## 2020-07-28 PROCEDURE — 73590 X-RAY EXAM OF LOWER LEG: CPT | Mod: 26,RT

## 2020-07-28 PROCEDURE — 96375 TX/PRO/DX INJ NEW DRUG ADDON: CPT

## 2020-07-28 PROCEDURE — 73590 X-RAY EXAM OF LOWER LEG: CPT

## 2020-07-28 PROCEDURE — 73630 X-RAY EXAM OF FOOT: CPT

## 2020-07-28 PROCEDURE — 99284 EMERGENCY DEPT VISIT MOD MDM: CPT | Mod: 25

## 2020-07-28 RX ORDER — HYDROMORPHONE HYDROCHLORIDE 2 MG/ML
0.5 INJECTION INTRAMUSCULAR; INTRAVENOUS; SUBCUTANEOUS ONCE
Refills: 0 | Status: DISCONTINUED | OUTPATIENT
Start: 2020-07-28 | End: 2020-07-28

## 2020-07-28 RX ORDER — KETOROLAC TROMETHAMINE 30 MG/ML
30 SYRINGE (ML) INJECTION ONCE
Refills: 0 | Status: DISCONTINUED | OUTPATIENT
Start: 2020-07-28 | End: 2020-07-28

## 2020-07-28 RX ORDER — KETOROLAC TROMETHAMINE 30 MG/ML
15 SYRINGE (ML) INJECTION ONCE
Refills: 0 | Status: DISCONTINUED | OUTPATIENT
Start: 2020-07-28 | End: 2020-07-28

## 2020-07-28 RX ORDER — SERTRALINE 25 MG/1
50 TABLET, FILM COATED ORAL DAILY
Refills: 0 | Status: DISCONTINUED | OUTPATIENT
Start: 2020-07-28 | End: 2020-08-02

## 2020-07-28 RX ORDER — BICTEGRAVIR SODIUM, EMTRICITABINE, AND TENOFOVIR ALAFENAMIDE FUMARATE 30; 120; 15 MG/1; MG/1; MG/1
1 TABLET ORAL DAILY
Refills: 0 | Status: DISCONTINUED | OUTPATIENT
Start: 2020-07-28 | End: 2020-08-02

## 2020-07-28 RX ORDER — CLONAZEPAM 1 MG
1 TABLET ORAL DAILY
Refills: 0 | Status: COMPLETED | OUTPATIENT
Start: 2020-07-28 | End: 2020-08-04

## 2020-07-28 RX ORDER — SODIUM CHLORIDE 9 MG/ML
1000 INJECTION, SOLUTION INTRAVENOUS ONCE
Refills: 0 | Status: COMPLETED | OUTPATIENT
Start: 2020-07-28 | End: 2020-07-28

## 2020-07-28 RX ADMIN — Medication 15 MILLIGRAM(S): at 05:00

## 2020-07-28 RX ADMIN — Medication 100 MILLIGRAM(S): at 04:08

## 2020-07-28 RX ADMIN — Medication 15 MILLIGRAM(S): at 04:08

## 2020-07-28 RX ADMIN — SODIUM CHLORIDE 1000 MILLILITER(S): 9 INJECTION, SOLUTION INTRAVENOUS at 04:08

## 2020-07-28 RX ADMIN — Medication 30 MILLIGRAM(S): at 09:58

## 2020-07-28 RX ADMIN — HYDROMORPHONE HYDROCHLORIDE 0.5 MILLIGRAM(S): 2 INJECTION INTRAMUSCULAR; INTRAVENOUS; SUBCUTANEOUS at 05:00

## 2020-07-28 RX ADMIN — HYDROMORPHONE HYDROCHLORIDE 0.5 MILLIGRAM(S): 2 INJECTION INTRAMUSCULAR; INTRAVENOUS; SUBCUTANEOUS at 04:08

## 2020-07-28 NOTE — ED CDU PROVIDER INITIAL DAY NOTE - ATTENDING CONTRIBUTION TO CARE
44 yo male with HIV with acute cellulitis of the foot without clear cause. I personally saw the patient with the PA, and completed the key components of the history and physical exam. I then discussed the management plan with the PA.

## 2020-07-28 NOTE — ED CDU PROVIDER DISPOSITION NOTE - NSFOLLOWUPINSTRUCTIONS_ED_ALL_ED_FT
Cellulitis    Cellulitis is a skin infection caused by bacteria. This condition occurs most often in the arms and lower legs but can occur anywhere over the body. Symptoms include redness, swelling, warm skin, tenderness, and chills/fever. If you were prescribed an antibiotic medicine, take it as told by your health care provider. Do not stop taking the antibiotic even if you start to feel better.    SEEK IMMEDIATE MEDICAL CARE IF YOU HAVE ANY OF THE FOLLOWING SYMPTOMS: worsening fever, red streaks coming from affected area, vomiting or diarrhea, or dizziness/lightheadedness.    Complete full course of Clindamycin / Augmentin as prescribed    Keep right leg elevated    Return to ED if you develop fevers, chills, vomiting, severe pain    Follow up with your primary infectious disease MD this week

## 2020-07-28 NOTE — ED CDU PROVIDER DISPOSITION NOTE - CLINICAL COURSE
PT with SPMHX of AIDS, Kaposi Sarcoma, who states he takes all his Meds as Rx,  presents to the ED with complaint of Rt foot swelling and tenderness over the last 3 days. Pt states that he had a gradual onset of Rt foot swelling and redness. Pt placed in CDU for abx recs from ID. Pt evaluated by ID who recommended pt be treated with clindamycin for 3 days and Augmentin for 7 days. Pt to follow up with primary ID MD upon discharge. Pt given strict return instructions ( fevers, chills, worsening pain, ) labs and imaging reviewed with pt. given good instructions when to return to ED and importance of f/u.  all incidental findings were discussed with pt as well. pt verbalized understanding.

## 2020-07-28 NOTE — CONSULT NOTE ADULT - ASSESSMENT
This 45y man with hx of life threatening presentation of AIDS in 10/2016, and complicated with KS affecting RLE, LLE, lungs at a minimum, s/p chemotherapy, with recurrent cellulitis.    he reports compliance with Biktarvy, follow with Dr. Perez.    He presents to the ED with complaint of Rt foot swelling and tenderness over the last 3 days. Pt states that he had a gradual onset of Rt foot swelling and redness. Pt states that pain is moderate in nature non radiating feels like soreness, made worse with activity, improved with nothing. PT states that he uses a compression device for his RLE edema and recently made some adjustments to the velcro straps.     RIGHT leg is more tender to movement and swollen. No fevers and no WBC elevation is noted.   patient is in the ER under observation status pending ID evaluation.     patient does not wish to be admitted.     Xray of the foot w/o evidence of fracture.       Impression:  right lower leg cellulitis  Right lower leg edema  history of Kaposi's sarcoma  HIV/AIDS    Plan:  - for cellulitis; suggest a 3 day course of Clindamycin 450mg PO TID for anti-toxin effect;  Would also use a 7- day course of Augmentin 875mg PO BID to empirically cover gram negatives and Skin nile in this patient.   - will need to make adjustments to his compression device/ loosen it up    - Regarding HIV/AIDS; most recent labs in February shows improvement in Viral load; CD4 counts remain below 200.   - should follow with Dr. Perez and also obtain repeat labs    -  if no improvement in leg cellulitis 48h - 72h; should return for inpatient admission

## 2020-07-28 NOTE — ED PROVIDER NOTE - OBJECTIVE STATEMENT
PT with SPMHX of AIDS, Kaposi Sarcoma, who states he takes all his Meds as Rx,  presents to the ED with complaint of Rt foot swelling and tenderness over the last 3 days. Pt states that he had a gradual onset of Rt foot swelling and redness. Pt states that pain is moderate in nature non radiating feels like soreness, made worse with activity, improved with nothing. PT states that they have not done anything for the pain prior to coming to the ED. Pt admits to subjective fever, dines, Weakness, numbness, tingling, loss of sensation, trama, neck pain, back pain.

## 2020-07-28 NOTE — ED ADULT TRIAGE NOTE - CHIEF COMPLAINT QUOTE
Pt complaining of right foot pain, swelling, hot to touch started last week and worsening now unable to weight bear on R foot without discomfort. Pt R foot and ankle red, warm to touch, swollen.

## 2020-07-28 NOTE — ED PROVIDER NOTE - ATTENDING CONTRIBUTION TO CARE
44 yo male with acute right foot swelling and redness. I personally saw the patient with the PA, and completed the key components of the history and physical exam. I then discussed the management plan with the PA.

## 2020-07-28 NOTE — ED PROVIDER NOTE - CLINICAL SUMMARY MEDICAL DECISION MAKING FREE TEXT BOX
PT with complicated medical hx, and with immunocompromised placed in obs for IV ABx and ID evaluation of infection, Pt informed of plan and agrees to plan of care.

## 2020-07-28 NOTE — ED CDU PROVIDER INITIAL DAY NOTE - RESPIRATORY, MLM
Hospice/Palliative Care is contacting office to request refill for oxycodone 30mg IR.  Call made to Linn and she confirms that she is only taking 4 of the oxycodone IR 30mg daily.  Refill is not due until 7/20/20.  Prescription sent for fill next week.  [place] [facility] [employer] [facility] writer called her.  Linn's pain assessed by home nurse. Pain is well controlled with current plan and patient is taking medication as ordered by providerYes. Reviewed with Shantell Roy NP, . Orders received: oxycodone 30mg IR. Nurse advised to reassess patient's pain in the next 24-48 hours and call office with uncontrolled pain or unmanaged symptoms. Last refill date: 7/6/20. Next MD appt: 8/11/20. Pharmacy: [facility].     Refill request routed to: Shantell Roy NP.     
Breath sounds clear and equal bilaterally.

## 2020-07-28 NOTE — ED CDU PROVIDER INITIAL DAY NOTE - MEDICAL DECISION MAKING DETAILS
PT with cellulitis placed in obs for Therapeutic intensity. PT seen BY OBS PA found to be appropriate CDU PT care transferred to PA.

## 2020-07-28 NOTE — ED ADULT NURSE REASSESSMENT NOTE - COMFORT CARE
side rails down/plan of care explained/warm blanket provided/repositioned/treatment delay explained/wait time explained/po fluids offered

## 2020-07-28 NOTE — CONSULT NOTE ADULT - SUBJECTIVE AND OBJECTIVE BOX
[facility]  INFECTIOUS DISEASES AND INTERNAL MEDICINE at [place]  =======================================================  Davidson Armas MD  Diplomates American Board of Internal Medicine and Infectious Diseases  Tel  [phone]  Fax [phone]  =======================================================    [id number]  JULIETA TOM   HPI:  This 45y man with hx of life threatening presentation of AIDS in 10/2016, and complicated with KS affecting RLE, LLE, lungs at a minimum, s/p chemotherapy, with recurrent cellulitis.    he reports compliance with Biktarvy, follow with Dr. Perez.    He presents to the ED with complaint of Rt foot swelling and tenderness over the last 3 days. Pt states that he had a gradual onset of Rt foot swelling and redness. Pt states that pain is moderate in nature non radiating feels like soreness, made worse with activity, improved with nothing. PT states that he uses a compression device for his RLE edema and recently made some adjustments to the velcro straps.     RIGHT leg is more tender to movement and swollen. No fevers and no WBC elevation is noted.   patient is in the ER under observation status pending ID evaluation.     patient does not wish to be admitted.     I have personally reviewed the labs and data; pertinent labs and data are listed in this note; please see below.   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  Kaposi's sarcoma of skin  Hepatitis B infection without delta agent without hepatic coma, unspecified chronicity  HIV (human immunodeficiency virus infection)  History of MRSA infection  H/O syphilis  S/P wrist surgery: right 1985  H/O mastoidectomy:   S/P arthroscopic surgery of right knee:     Problem List:  ==========  HEALTH ISSUES - PROBLEM Dx:      Social Hx:  =======  no toxic habits currently    FAMILY HISTORY:  No pertinent family history in first degree relatives  no significant family history of immunosuppressive disorders in mother or father   =======================================================  REVIEW OF SYSTEMS:  CONSTITUTIONAL:  No Fever or chills  HEENT:  No diplopia or blurred vision.  No earache, sore throat or runny nose.  CARDIOVASCULAR:  No pressure, squeezing, strangling, tightness, heaviness or aching about the chest, neck, axilla or epigastrium.  RESPIRATORY:  No cough, shortness of breath  GASTROINTESTINAL:  No nausea, vomiting or diarrhea.  GENITOURINARY:  No dysuria, frequency or urgency. No Blood in urine  MUSCULOSKELETAL:  no joint aches, no muscle pain  SKIN:  as per HPI  NEUROLOGIC:  No Headaches, seizures or weakness.  PSYCHIATRIC:  No disorder of thought or mood.  ENDOCRINE:  No heat or cold intolerance  HEMATOLOGICAL:  No easy bruising or bleeding.   =======================================================  Allergies  BuSpar (Other)  Diphendramine HCL (Other)      Percocet 10/325 (Other (Moderate))  Vicodin (Other (Moderate))  Antibiotics:  bictegravir 50 mG/emtricitabine 200 mG/tenofovir alafenamide 25 mG (BIKTARVY) 1 Tablet(s) Oral daily  clindamycin IVPB      clindamycin IVPB 600 milliGRAM(s) IV Intermittent every 8 hours    Other medications:  clonazePAM  Tablet 1 milliGRAM(s) Oral daily  sertraline 50 milliGRAM(s) Oral daily     clindamycin IVPB   100 mL/Hr IV Intermittent (20 @ 04:08)      ======================================================  Physical Exam:  ============  T(F): 97.5 (2020 08:35), Max: 98.7 (2020 02:48)  HR: 56 (2020 08:35)  BP: 109/70 (2020 08:35)  RR: 16 (2020 08:35)  SpO2: 97% (2020 08:35) (96% - 98%)  temp max in last 48H T(F): , Max: 98.7 (20 @ 02:48)Height (cm): 172.72 (20 @ 02:48)  Weight (kg): 83.9 (20 @ 02:48)  BMI (kg/m2): 28.1 (20 @ 02:48)  BSA (m2): 1.98 (20 @ 02:48)    General:  No acute distress.  Eye: Pupils are equal, round and reactive to light, Extraocular movements are intact, Normal conjunctiva.  HENT: Normocephalic, Oral mucosa is moist, No pharyngeal erythema, No sinus tenderness.  Neck: Supple, No lymphadenopathy.  Respiratory: Lungs are clear to auscultation, Respirations are non-labored.  Cardiovascular: Normal rate, Regular rhythm,   Gastrointestinal: Soft, Non-tender, Non-distended, Normal bowel sounds.  Genitourinary: No costovertebral angle tenderness.  Lymphatics: No lymphadenopathy neck,   Musculoskeletal: Normal range of motion, Normal strength.  Integumentary:   RIGHT leg with stasis changes.   NO EROSION or MACERATION Of skin between toes.  SLIGHT INCREASE IN WARMTH of leg.   Neurologic: Alert, Oriented, No focal deficits, Cranial Nerves II-XII are grossly intact.  Psychiatric: Appropriate mood & affect.    =======================================================  Labs:                        14.7   5.30  )-----------( 237      ( 2020 04:12 )             43.5          135  |  102  |  16.0  ----------------------------<  94  4.8   |  24.0  |  0.94    Ca    8.8      2020 04:12    TPro  7.3  /  Alb  3.7  /  TBili  0.5  /  DBili  x   /  AST  20  /  ALT  16  /  AlkPhos  60        Creatinine, Serum: 0.94 mg/dL (20 @ 04:12)        T Cell Subset (19 @ 10:09)    CD8 %: 64 %    ABS CD3: 907 /uL    ABS CD4: 160 /uL    CD4 %: 14 %    ABS CD8: 738 /uL    CD3 %: 79   %  :     Reference ranges for pediatric population (0-18 years old) are from Clifton Mueller, et al. "Lymphocyte subsets in healthy children from birth through 18 years of age: the Pediatric AIDS Clinical Trials Group   study. "Journal of Allergy and Clinical Immunology 112.5 (2003):973-980. Reference range for adult (18-65 years old) and geriatric (65 years old and above) populations are developed at [place], [place].    T Cell Subset (07.10.18 @ 22:39)    CD3 %: 86 %    CD4 %: 16 %    ABS CD8: 838 /uL    ABS CD4: 189 /uL    CD8 %: 70 %    ABS CD3: 1032 /uL        HIV-1 RNA Quantitative, Viral Load (19 @ 01:02)    HIV-1 RNA Quantitative, Viral Load:   106    HIV-1 RNA Quantitative, Vir Load Interp: See Comment METHOD: Transcription Mediated Amplification (TMA) – [employer].  Results from HIV-1 RNA tests that use other  methods might differ.  Abbreviations:  DET. = Detected,  not det. = Not Detected  n/a = not available  .    HIV-1 RNA Quantitative, Viral Load Lo.02    HIV-1 Viral Load Result: DET.      HIV-1 RNA Quantitative, Viral Load (07.10.18 @ 19:44)    HIV-1 RNA Quantitative, Viral Load:   829    HIV-1 Viral Load Result: DET.    HIV-1 RNA Quantitative, Viral Load Lo.92    HIV-1 RNA Quantitative, Vir Load Interp: See Comment METHOD: Transcription Mediated Amplification (TMA) – [employer].  Results from HIV-1 RNA tests that use other  methods might differ.  Abbreviations:  DET. = Detected,  not det. = Not Detected  n/a = not available  .    < from: Xray Foot AP + Lateral + Oblique, Right (20 @ 05:50) >   EXAM:  FOOT-RIGHT                          PROCEDURE DATE:  2020          INTERPRETATION:  CLINICAL INFORMATION: Cellulitis.    EXAM:  3 views of the right foot.    COMPARISON:  None.    FINDINGS:    No acute fracture or dislocation. Soft tissue swelling of the right foot.    Joint spaces are preserved.    No bony erosion.    IMPRESSION:  No acute fracture or dislocation. No bony erosion. Soft tissue swelling of the right foot.              LISA LEWIS M.D., ATTENDING RADIOLOGIST  This document has been electronically signed. 2020  9:44AM              < end of copied text >        < from: Xray Chest 1 View- PORTABLE-Urgent (20 @ 05:50) >  EXAM:  XR CHEST PORTABLE URGENT 1V                          PROCEDURE DATE:  2020          INTERPRETATION:  CLINICAL INFORMATION: ro PNA. . . Sepsis.    EXAM: AP chest.    COMPARISON: CT chest 10/10/2017. Chest radiograph 2017.    FINDINGS:  No focal consolidation.  No pleural effusions or pneumothorax.  The cardiomediastinal silhouette is within normal limits.      IMPRESSION: No focal consolidation.              LISA LEWIS M.D., ATTENDING RADIOLOGIST  This document has been electronically signed. 2020  9:42AM              < end of copied text >

## 2020-07-28 NOTE — ED CDU PROVIDER DISPOSITION NOTE - PATIENT PORTAL LINK FT
You can access the FollowMyHealth Patient Portal offered by [facility] by registering at the following website: [email or web address]. By joining [facility]’s FollowMyHealth portal, you will also be able to view your health information using other applications (apps) compatible with our system.

## 2020-07-28 NOTE — ED ADULT NURSE REASSESSMENT NOTE - NS ED NURSE REASSESS COMMENT FT1
Pt received in the stretcher resting comfortably, no distress noted , breathing easy and unlabored, VSS , will continue  to monitor

## 2020-08-10 ENCOUNTER — APPOINTMENT (OUTPATIENT)
Dept: INFECTIOUS DISEASE | Facility: CLINIC | Age: 45
End: 2020-08-10
Payer: MEDICARE

## 2020-08-10 DIAGNOSIS — Z23 ENCOUNTER FOR IMMUNIZATION: ICD-10-CM

## 2020-08-10 PROCEDURE — 99214 OFFICE O/P EST MOD 30 MIN: CPT | Mod: 95

## 2020-08-10 NOTE — ASSESSMENT
[HIV Education] : HIV Education [FreeTextEntry1] : HIV doing well on Biktarvy\par Will continue current meds\par Check CD4, VL, SMA, CBC\par Had COVID negative in May and June\par Vaccines: Needs second Heplisav\par Check Free Testosterone in the AM\par Dental: needs appt, over 2 years- full dentures- needs to be refitted.\par Maintain exercise, elevation of right legs.\par Uses tramadol for pain, last filled in February, uses for calf and leg pain\par

## 2020-08-10 NOTE — HISTORY OF PRESENT ILLNESS
[Home] : at home, [unfilled] , at the time of the visit. [Medical Office: ([facility])___] : at the medical office located in  [Verbal consent obtained from patient] : the patient, [unfilled] [Sexually Active] : The patient is sexually active [Men] : with men [Male ___] : [unfilled] male [FreeTextEntry1] : HIV on Biktarvy\par Tolerating well\par Takes daily in the morning at 6:30\par Missed 2 doses since March 3rd (overslept once)\par No missed doses this week.\par \par He lives on [place] for the summer\par He has trouble with breathing\par \par Had cellulitis and in hospital last week at [place]\par Had seen urgicare doc at [place]\par Got 3 days of cellulitis and 7 days of augmentin\par For right leg- ankle and foot\par Swelling had been down- uses finger toe shoes.\par Walks in surf at times.  Uses pressure machine on legs daily.\par He has some open areas on the skin, or mosquito bites.\par  [Monogamous] : is not monogamous [de-identified] : HIV+ not on meds- [employer]

## 2020-08-19 ENCOUNTER — APPOINTMENT (OUTPATIENT)
Dept: INFECTIOUS DISEASE | Facility: CLINIC | Age: 45
End: 2020-08-19

## 2020-08-25 ENCOUNTER — APPOINTMENT (OUTPATIENT)
Dept: INFECTIOUS DISEASE | Facility: CLINIC | Age: 45
End: 2020-08-25
Payer: COMMERCIAL

## 2020-08-25 ENCOUNTER — TRANSCRIPTION ENCOUNTER (OUTPATIENT)
Age: 45
End: 2020-08-25

## 2020-08-25 PROCEDURE — 99442: CPT

## 2020-09-22 ENCOUNTER — RX RENEWAL (OUTPATIENT)
Age: 45
End: 2020-09-22

## 2020-10-14 ENCOUNTER — RX RENEWAL (OUTPATIENT)
Age: 45
End: 2020-10-14

## 2020-10-19 ENCOUNTER — NON-APPOINTMENT (OUTPATIENT)
Age: 45
End: 2020-10-19

## 2020-10-27 ENCOUNTER — LABORATORY RESULT (OUTPATIENT)
Age: 45
End: 2020-10-27

## 2020-10-27 ENCOUNTER — APPOINTMENT (OUTPATIENT)
Dept: INFECTIOUS DISEASE | Facility: CLINIC | Age: 45
End: 2020-10-27
Payer: MEDICARE

## 2020-10-27 VITALS
SYSTOLIC BLOOD PRESSURE: 129 MMHG | HEART RATE: 77 BPM | OXYGEN SATURATION: 98 % | DIASTOLIC BLOOD PRESSURE: 76 MMHG | BODY MASS INDEX: 31.55 KG/M2 | TEMPERATURE: 97.9 F | WEIGHT: 201 LBS | HEIGHT: 67 IN

## 2020-10-27 PROCEDURE — G0010: CPT

## 2020-10-27 PROCEDURE — 90686 IIV4 VACC NO PRSV 0.5 ML IM: CPT

## 2020-10-27 PROCEDURE — 90739 HEPB VACC 2/4 DOSE ADULT IM: CPT

## 2020-10-27 PROCEDURE — 99214 OFFICE O/P EST MOD 30 MIN: CPT | Mod: 25

## 2020-10-27 PROCEDURE — G0008: CPT

## 2020-10-28 DIAGNOSIS — E80.4 GILBERT SYNDROME: ICD-10-CM

## 2020-10-28 NOTE — PHYSICAL EXAM
[General Appearance - Alert] : alert [General Appearance - In No Acute Distress] : in no acute distress [Sclera] : the sclera and conjunctiva were normal [PERRL With Normal Accommodation] : pupils were equal in size, round, reactive to light [Extraocular Movements] : extraocular movements were intact [Outer Ear] : the ears and nose were normal in appearance [Oropharynx] : the oropharynx was normal with no thrush [Neck Appearance] : the appearance of the neck was normal [Neck Cervical Mass (___cm)] : no neck mass was observed [Jugular Venous Distention Increased] : there was no jugular-venous distention [Thyroid Diffuse Enlargement] : the thyroid was not enlarged [Auscultation Breath Sounds / Voice Sounds] : lungs were clear to auscultation bilaterally [Heart Rate And Rhythm] : heart rate was normal and rhythm regular [Heart Sounds] : normal S1 and S2 [Heart Sounds Gallop] : no gallops [Murmurs] : no murmurs [Heart Sounds Pericardial Friction Rub] : no pericardial rub [Full Pulse] : the pedal pulses are present [Edema] : there was no peripheral edema [Bowel Sounds] : normal bowel sounds [Abdomen Soft] : soft [Abdomen Tenderness] : non-tender [Abdomen Mass (___ Cm)] : no abdominal mass palpated [Costovertebral Angle Tenderness] : no CVA tenderness [No Palpable Adenopathy] : no palpable adenopathy [Musculoskeletal - Swelling] : no joint swelling [Nail Clubbing] : no clubbing  or cyanosis of the fingernails [Motor Tone] : muscle strength and tone were normal [Skin Color & Pigmentation] : normal skin color and pigmentation [] : no rash [Deep Tendon Reflexes (DTR)] : deep tendon reflexes were 2+ and symmetric [Sensation] : the sensory exam was normal to light touch and pinprick [No Focal Deficits] : no focal deficits [Oriented To Time, Place, And Person] : oriented to person, place, and time [Affect] : the affect was normal [FreeTextEntry1] : Lymphedema right leg

## 2020-10-28 NOTE — HISTORY OF PRESENT ILLNESS
[FreeTextEntry1] : HIV on Biktarvy\par Tolerating well\par Takes daily in the morning 7-9 AM\par No missed doses this week\par \par Has been out at [place] \par Has meds shipped to mother and sent by ferry out\par Has been working with [employer].\par Working on past bills he received, due to retroactive medicaid billing\par \par Has been hard to breath well with the mask.\par Does not wear it on the beach\par Has SOB and some wheezing at times\par Has stamina\par Had asthma as a child, had childhood secondary smoke.\par Had lost 8 lbs.  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  One resident committed suicide [Sexually Active] : The patient is not sexually active

## 2020-10-28 NOTE — ASSESSMENT
[HIV Education] : HIV Education [FreeTextEntry1] : HIV, continue Biktarvy\par Discussed weight issues, trying to focus on healthy eating\par Flu shot today, pneumovax UTD, second heplisav today\par Feels tired more often, check TFTs\par Hep B S ab an dAg negative, check core Ab\par Check CD4, VL, SMA, CBC and Cystatin C\par Non-fasted lipids\par No FH of colon CA\par Anal Cancer screen, for HRA\par Dental: 2 years ago. has full dentures.\par Seeing oncologist had spots on chest, had CT chest

## 2020-10-30 ENCOUNTER — OUTPATIENT (OUTPATIENT)
Dept: OUTPATIENT SERVICES | Facility: HOSPITAL | Age: 45
LOS: 1 days | Discharge: HOME | End: 2020-10-30

## 2020-10-30 DIAGNOSIS — C46.0 KAPOSI'S SARCOMA OF SKIN: ICD-10-CM

## 2020-10-30 DIAGNOSIS — Z90.89 ACQUIRED ABSENCE OF OTHER ORGANS: Chronic | ICD-10-CM

## 2020-10-30 DIAGNOSIS — Z98.890 OTHER SPECIFIED POSTPROCEDURAL STATES: Chronic | ICD-10-CM

## 2020-10-30 DIAGNOSIS — Z86.19 PERSONAL HISTORY OF OTHER INFECTIOUS AND PARASITIC DISEASES: Chronic | ICD-10-CM

## 2020-10-30 DIAGNOSIS — Z86.14 PERSONAL HISTORY OF METHICILLIN RESISTANT STAPHYLOCOCCUS AUREUS INFECTION: Chronic | ICD-10-CM

## 2020-11-06 ENCOUNTER — APPOINTMENT (OUTPATIENT)
Dept: HEMATOLOGY ONCOLOGY | Facility: CLINIC | Age: 45
End: 2020-11-06
Payer: MEDICARE

## 2020-11-06 ENCOUNTER — NON-APPOINTMENT (OUTPATIENT)
Age: 45
End: 2020-11-06

## 2020-11-06 VITALS
SYSTOLIC BLOOD PRESSURE: 121 MMHG | RESPIRATION RATE: 14 BRPM | OXYGEN SATURATION: 99 % | WEIGHT: 200.62 LBS | BODY MASS INDEX: 31.42 KG/M2 | TEMPERATURE: 98 F | HEART RATE: 82 BPM | DIASTOLIC BLOOD PRESSURE: 82 MMHG

## 2020-11-06 DIAGNOSIS — Z85.118 PERSONAL HISTORY OF OTHER MALIGNANT NEOPLASM OF BRONCHUS AND LUNG: ICD-10-CM

## 2020-11-06 PROCEDURE — 99215 OFFICE O/P EST HI 40 MIN: CPT

## 2020-11-06 NOTE — ASU PREOP CHECKLIST - BP NONINVASIVE DIASTOLIC (MM HG)
85 Solaraze Counseling:  I discussed with the patient the risks of Solaraze including but not limited to erythema, scaling, itching, weeping, crusting, and pain.

## 2020-11-09 ENCOUNTER — NON-APPOINTMENT (OUTPATIENT)
Age: 45
End: 2020-11-09

## 2020-11-09 DIAGNOSIS — N49.1 INFLAMMATORY DISORDERS OF SPERMATIC CORD, TUNICA VAGINALIS AND VAS DEFERENS: ICD-10-CM

## 2020-11-11 PROBLEM — Z85.118: Status: RESOLVED | Noted: 2017-12-07 | Resolved: 2020-11-11

## 2020-11-11 NOTE — HISTORY OF PRESENT ILLNESS
[Disease: _____________________] : Disease: [unfilled] [AJCC Stage: ____] : AJCC Stage: [unfilled] [Cycle: ___] : Cycle: [unfilled] [de-identified] : 45 year old male presenting to [facility] for oncologic care. He is formerly under the care of Dr. Esequiel Durbin (medical oncology). Patient was seen with life threatening presentation in 10/2016, of AIDS and complicating infections. His KS has come to the fore in the weeks after his diagnosis and treatment affecting RLE, LLE, lungs at a minimum. \par \par 2016: 10-15 lb weight loss, night sweats, dysphagia. Notes he recalls little or nothing of the last two weeks prior to hospitalization, being taken by water ambulance from [place] to hospital \par \par 10/5/2016-10/16/2016: Admit [facility] with respiratory failure, initial diagnosis of AIDS. CF4 = 4, CF4/8 = 0/12, HIV RNA VL = 61229. Recalls tested negative in , and states was negative by Oraquick test in 2016. Admitted with WBC 1.2, Hb 11.6, , Na 123, Creat 1.33, ABG 7.52/28/56, with CXR with opacity in RLL. Rx abx, Rx Bipap and care in MICU. LEFT and RIGHT  leg swelling noted, U/S negative for clot. CT angio showed no PE. Multifocal pneumonia noted on CT. Found with enterovirus, rhinovirus, RSV positive, and HIV positive. Cx showed S. pneumonia. AFB negative in sputa to date. Hep B also found positive, viral load 98 IU/mL.  U/S showed gallstones but no cholecystitis. Also positive for oropharyngeal thrush.  Found also with severe protein chen malnutrition, suggested supplements.  \par \par 10/22/2016: Readmit with increasing RIGHT lower extremity pain and swelling. Multifocal lesions noted worsening over shin > posteriorly. RIGHT foot with substantial edema causing continuous unbearable pain.  \par \par 10/31/2016: Eventually Bx skin lesions showing KS. Rx x 1 PLD/Doxil before DC to rehab, which per patient and mom's report had been an utter disaster in terms of transition of care with medications and diagnoses incorrect at multiple junctures during stay. \par \par 16 : Reports R lower extremity swelling is much improved.  R lower extremity feels better, no open lesions. No other lesions noted. He is improving in terms of activity and now at home after being DC'd from rehab.\par \par 16 : Cycle 3 D 1 of PLD Dox. Reports felling well and his R leg is much improved after his 2nd cycle. Today his WBC is 2.7 and ANC of 300. He reports that he had an episode of fever on 16, 101F but did not report. He has not had any fevers since then and does not have chills, malaise or other S/S of infection. \par PLD/DOX held for 2 weeks for recovery.\par \par : Was seen by Dr. Alonzo at ID clinic for AIDS and Hep B. DDI noted with PLD/DOX and Tenofovir (CY inhibition) in [place]. Tenofovir being changed to Descovy and Tivicay in order to avoid drug-interactions. This may have prolonged neutropenia and PLD/Dox to stay longer. His PLD/DOX is dose reduced to 35 mg/m2 today as well. Today's CBC+diff is pending. \par \par 17: Cycle 4 today.  Cycle 3 dose reduced to 35 mg/m2 secondary to NTP and HIV medication was changed. Abscess under R axilla went to urgent center. Was was on clindamycin for 7 days(11). Now completely resolved.   He would like to go to a "salt steam house" for relaxation and breathing. \par \par 3/16/17: Here for cycle 5 of PLDox. He had another abscess under his right arm, completed clindamycin x 7 days. Continues to be followed closely with Dr. Alonzo(ID) for HIV. \par Weight gain on therapy, otherwise feels well. \par \par 17: Nolan comes in for cycle 6 of therapy. He had difficult time with his last cycle fatigue which lasted for about 4 days. He is fully recovered and feels well. He has gained a considerable amount of weight since starting therapy. Now moved back to [place] and walked 8 miles yesterday. No SOB, JULIEN, pain. \par \par 17 : Nolan is here for a follow up. He had CT chest on 17 showing significant improvement.  He reports R leg swelling worsening over a week and there is a small cut in the anterior portion 3 mm, oozing serous fluid and contralateral leg edema for 2 weeks with some read patches along the anterior portion of the shin, tender to palpate. No identifiable injury on the skin.  Saw Dr. Silva for follow up this morning. Currently on Descovy and Tivicay for HIV.\par \par 17 : Nolan comes  in today for a follow up. Completed 6 cycles of PLD/Doxil in  and is back in [facility]. Has had erythematous bilateral LE with edema on his last visit, we decided to monitor rather than biopsy and additional tests.   \par \par 10/6/17: Here for a follow up. He had acute hep A with elevated LFTs in September. He had fatigue at that time and refused to go to the ED as he was on [place]. His blood work from ID showed elevated LFTs.  CT chest today - results not on  as of 12 noon. He also notes pain in the sole of the foot and increased edema causing pain.\par \par 10/31/17: Nolan's LEFT leg and foot erythema and edema has improved only slightly since our last visit with ABX and now here for further discussion regarding management of KS complicating AIDS, with substantial symptoms from the damage the tumor has done to the soft tissue of the right > left lower extremity. CT chest showed stable changes with small pulmonary nodules and hypertension. Taking tramadol for pain. Pain in rated 3 on the tramadol. \par \par 2018:  Recent erythema right LE again. This dissipated within a few days. There was no warmth or erythema to suggest a cellulitis.  He notes increased xerosis and bumpiness to the skin and is out of triamcinolone and urea creams. He noted "PMS like symptoms" a day or two after chemotherapy, alleviated with acupuncture but likely related to pre treatment dexamethasone. \par \par 18 : Here for a follow up and for cycle 10 of PLD/Dox. He is due to go back to [place] to the summer after this Rx. He had substantial agitation at his ID visit, and was referred to a psychiatrist, who Rx clonazepam 0.5 bid with good effect. He had heartburn and agitation after last cycle of Rx, ? related to dexamethasone. He has continued leg pain and has heard of cannabis oil for the leg for pain. 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 His leg looks better than it has since diagnosis, however. He still has occasional pain and has skin issues such as cellulitis from time to time.  \par \par 12/3/2018: RTC feeling well, had tick bite this summer but no cellulitis. Still with anxiety disorder but finds present Rx with clonazepam and zolpidem helping overall. Occ respiratory issues with cold weather coming on, and also has some muscle fatigue / cramping in legs after working at the end of a day. \par \par 06/10/2019: Has baseline dyspnea with exertion and peripheral edema, but doing relatively well. He has done well with lymphedema therapy with compression stockings and other intervention since his surgical visit 2019. Skin is still friable and prone to ooze or bleed with injury but there is no violaceous recurrence to suggest KS recurrence. \par \par 19 : Here for a follow up. Doing better overall with lymphedema therapy, but still with residual scarring of legs / chronic edematous changes in RLE > LLE. A couple of mosquito bites have appeared to turn into early cellulitis over the summer but he has managed with topical Abx. \par \par 2020 transfer care to Dr. Griffin \par \par 2020 transfer care to Dr. Desouza\par He is now seen for advice, opinion on further management [FreeTextEntry1] : PLD/DOX every 28 days x 6 cycles (November 2016 - 4/20/2017), PLD/DOX every 28 days x 6 cycles (11/9/2017 - 4/5/2018); currently on surveillance [de-identified] : Patient comes with mom to transfer care and has no acute complaints, had CT chest to evaluate for lung disease \par has no new complaints related to KS or HIV

## 2020-11-11 NOTE — ASSESSMENT
[Curative] : Goals of care discussed with patient: Curative [FreeTextEntry1] : advised to consider screening colonoscopy given change in colon cancer guidelines

## 2020-11-11 NOTE — REVIEW OF SYSTEMS
[Negative] : Allergic/Immunologic [Fever] : no fever [Chills] : no chills [Night Sweats] : no night sweats [Fatigue] : no fatigue [Eye Pain] : no eye pain [Red Eyes] : eyes not red [Dry Eyes] : no dryness of the eyes [Vision Problems] : no vision problems [Dysphagia] : no dysphagia [Loss of Hearing] : no loss of hearing [Nosebleeds] : no nosebleeds [Hoarseness] : no hoarseness [Odynophagia] : no odynophagia [Mucosal Pain] : no mucosal pain [Chest Pain] : no chest pain [Palpitations] : no palpitations [Leg Claudication] : no intermittent leg claudication [Lower Ext Edema] : no lower extremity edema [Shortness Of Breath] : no shortness of breath [Cough] : no cough [Abdominal Pain] : no abdominal pain [Vomiting] : no vomiting [Constipation] : no constipation [Diarrhea] : no diarrhea [Dysuria] : no dysuria [Incontinence] : no incontinence [Joint Pain] : no joint pain [Joint Stiffness] : no joint stiffness [Muscle Pain] : no muscle pain [Confused] : no confusion [Dizziness] : no dizziness [Fainting] : no fainting [Difficulty Walking] : no difficulty walking [Suicidal] : not suicidal [Insomnia] : no insomnia [Depression] : no depression [Proptosis] : no proptosis [Hot Flashes] : no hot flashes [Muscle Weakness] : no muscle weakness [Deepening Of The Voice] : no deepening of the voice [Easy Bleeding] : no tendency for easy bleeding [Easy Bruising] : no tendency for easy bruising [Swollen Glands] : no swollen glands [de-identified] : RLE edema

## 2020-11-11 NOTE — PHYSICAL EXAM
[Fully active, able to carry on all pre-disease performance without restriction] : Status 0 - Fully active, able to carry on all pre-disease performance without restriction [Normal] : affect appropriate [Ulcers] : no ulcers [Mucositis] : no mucositis [Thrush] : no thrush [Vesicles] : no vesicles [de-identified] : as seen [de-identified] : RLE edema compared to left, no visible KS lesions  [de-identified] : R leg with less xerosis, corrugated aspect of the calf and shin with baby oil and other emollients.

## 2020-11-17 ENCOUNTER — NON-APPOINTMENT (OUTPATIENT)
Age: 45
End: 2020-11-17

## 2020-11-18 ENCOUNTER — APPOINTMENT (OUTPATIENT)
Dept: PHYSICAL MEDICINE AND REHAB | Facility: CLINIC | Age: 45
End: 2020-11-18
Payer: MEDICARE

## 2020-11-18 ENCOUNTER — RX RENEWAL (OUTPATIENT)
Age: 45
End: 2020-11-18

## 2020-11-18 VITALS
BODY MASS INDEX: 32.46 KG/M2 | SYSTOLIC BLOOD PRESSURE: 124 MMHG | RESPIRATION RATE: 17 BRPM | DIASTOLIC BLOOD PRESSURE: 79 MMHG | HEART RATE: 77 BPM | OXYGEN SATURATION: 97 % | WEIGHT: 206.79 LBS | TEMPERATURE: 96.8 F | HEIGHT: 67 IN

## 2020-11-18 PROCEDURE — 99204 OFFICE O/P NEW MOD 45 MIN: CPT

## 2020-11-18 NOTE — PHYSICAL EXAM
[FreeTextEntry1] : GEN: AAOx3, NAD.\par PSYCH: Normal mood and affect. Responds appropriately to commands.\par EYES: Sclerae Anicteric. No discharge. EOMI.\par RESP: Breathing unlabored.\par EXT: swelling b/l LE- LLE pitting\par SKIN: chronic skin changes RLE due to kaposis\par STRENGTH: 4+/5 R hip flexion and Knee extension on the right, otherwise 5/5\par TONE: Normal, No clonus. \par SENS: Grossly intact to light touch bilateral lower extremities. \par GAIT: Non antalgic, normal reciprocating heel to toe\par LUMBAR ROM: Flexion, extension full and pain free.  \par PALP: + R calf tenderness\par SPECIAL:  \par 10 cm below patella on R: 43.5 cm\par 10 cm below patella on L :40 cm\par \par

## 2020-11-18 NOTE — HISTORY OF PRESENT ILLNESS
[FreeTextEntry1] : 44 yo m presenting to PM&R for initial visit for RLE lymphedema. Referred by Dr. Desouza.  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   Per chart patient was diagnosed in 2016 and has been having LE swelling since that time. He was treated for KS in 2017 with chemo. He has had LE pain and edema since that time, with soft tissue damage from the tumor R>L.   He takes tramadol for pain as needed, hasn't taken it in the last few weeks. He has a history of recurrent cellulitis, most recently treated for cellulitis 2 weeks ago and is still on oral antibiotics which completes on Friday.  Pt states this recent cellulitis was b/l.   Pain is 6/10 today, and he states pain is affected by weather.  Pain improves in hospitals. \par \par Patient uses compression pump RLE daily until September, has not been using recently due to cellulitis.  He also has compression garments which are too hot in the summer and uncomfortable.  He also had difficulty taking them off.

## 2020-11-18 NOTE — ASSESSMENT
[FreeTextEntry1] : 45 year old male with history of kaposis sarcoma, lymphedema, recurrent cellulitis\par - will order b/l LE dopplers prior to starting lymphedema therapy, patient has new LLE swelling \par - follow up in 4-6 weeks

## 2020-12-02 ENCOUNTER — APPOINTMENT (OUTPATIENT)
Dept: ULTRASOUND IMAGING | Facility: CLINIC | Age: 45
End: 2020-12-02
Payer: MEDICARE

## 2020-12-02 ENCOUNTER — OUTPATIENT (OUTPATIENT)
Dept: OUTPATIENT SERVICES | Facility: HOSPITAL | Age: 45
LOS: 1 days | End: 2020-12-02
Payer: MEDICARE

## 2020-12-02 DIAGNOSIS — Z98.890 OTHER SPECIFIED POSTPROCEDURAL STATES: Chronic | ICD-10-CM

## 2020-12-02 DIAGNOSIS — Z90.89 ACQUIRED ABSENCE OF OTHER ORGANS: Chronic | ICD-10-CM

## 2020-12-02 DIAGNOSIS — Z86.14 PERSONAL HISTORY OF METHICILLIN RESISTANT STAPHYLOCOCCUS AUREUS INFECTION: Chronic | ICD-10-CM

## 2020-12-02 DIAGNOSIS — Z86.19 PERSONAL HISTORY OF OTHER INFECTIOUS AND PARASITIC DISEASES: Chronic | ICD-10-CM

## 2020-12-02 DIAGNOSIS — Z00.8 ENCOUNTER FOR OTHER GENERAL EXAMINATION: ICD-10-CM

## 2020-12-02 PROCEDURE — 93970 EXTREMITY STUDY: CPT | Mod: 26

## 2020-12-02 PROCEDURE — 93970 EXTREMITY STUDY: CPT

## 2020-12-09 ENCOUNTER — NON-APPOINTMENT (OUTPATIENT)
Age: 45
End: 2020-12-09

## 2020-12-30 ENCOUNTER — NON-APPOINTMENT (OUTPATIENT)
Age: 45
End: 2020-12-30

## 2021-01-12 ENCOUNTER — APPOINTMENT (OUTPATIENT)
Dept: INFECTIOUS DISEASE | Facility: CLINIC | Age: 46
End: 2021-01-12
Payer: MEDICARE

## 2021-01-12 VITALS
OXYGEN SATURATION: 98 % | SYSTOLIC BLOOD PRESSURE: 127 MMHG | WEIGHT: 214 LBS | HEART RATE: 69 BPM | BODY MASS INDEX: 33.59 KG/M2 | RESPIRATION RATE: 16 BRPM | TEMPERATURE: 97.4 F | DIASTOLIC BLOOD PRESSURE: 79 MMHG | HEIGHT: 67 IN

## 2021-01-12 DIAGNOSIS — R85.612 LOW GRADE SQUAMOUS INTRAEPITHELIAL LESION ON CYTOLOGIC SMEAR OF ANUS (LGSIL): ICD-10-CM

## 2021-01-12 PROCEDURE — 46601 DIAGNOSTIC ANOSCOPY: CPT

## 2021-01-13 LAB — ANAL PAP CYTOLOGY: NORMAL

## 2021-01-13 NOTE — ASSESSMENT
[FreeTextEntry1] : External perianal skin tag\par \par Anal PAP performed \par \par YAMILE normal tone, no masses, nontender \par \par HRA:\par SCJ visualized \par Procedure stopped after about 5-10 min. \par Attempted biopsy of anterior acetowhite lesions but stopped due to pain, no bleeding and no specimen obtained. \par His right knee became too uncomfortable (Kaposi's sarcoma, chronic edema). \par Changed to left lateral decubitus position. SCJ visualized. \par He became extremely anxious, was hyperventilating and did not want to proceed further. He wished he took a clonipin first. He prefers to monitor by anal PAP alone and "can deal with cancer" if he gets it.

## 2021-01-13 NOTE — HISTORY OF PRESENT ILLNESS
[FreeTextEntry1] : LSIL anal PAP 2/2017 \par Referred for HRA \par Discussed indications, details of the procedure and answered questions. \par He feels well. No bleeding pain or discharge per rectum.

## 2021-01-18 ENCOUNTER — RX RENEWAL (OUTPATIENT)
Age: 46
End: 2021-01-18

## 2021-01-19 ENCOUNTER — RX RENEWAL (OUTPATIENT)
Age: 46
End: 2021-01-19

## 2021-01-20 ENCOUNTER — FORM ENCOUNTER (OUTPATIENT)
Age: 46
End: 2021-01-20

## 2021-01-21 ENCOUNTER — APPOINTMENT (OUTPATIENT)
Dept: INFECTIOUS DISEASE | Facility: CLINIC | Age: 46
End: 2021-01-21
Payer: MEDICARE

## 2021-01-21 PROCEDURE — 98966 PH1 ASSMT&MGMT NQHP 5-10: CPT | Mod: 95

## 2021-01-21 PROCEDURE — 99443: CPT | Mod: 95

## 2021-01-26 ENCOUNTER — NON-APPOINTMENT (OUTPATIENT)
Age: 46
End: 2021-01-26

## 2021-01-27 ENCOUNTER — TRANSCRIPTION ENCOUNTER (OUTPATIENT)
Age: 46
End: 2021-01-27

## 2021-01-27 ENCOUNTER — NON-APPOINTMENT (OUTPATIENT)
Age: 46
End: 2021-01-27

## 2021-01-29 ENCOUNTER — NON-APPOINTMENT (OUTPATIENT)
Age: 46
End: 2021-01-29

## 2021-02-04 ENCOUNTER — NON-APPOINTMENT (OUTPATIENT)
Age: 46
End: 2021-02-04

## 2021-02-05 ENCOUNTER — NON-APPOINTMENT (OUTPATIENT)
Age: 46
End: 2021-02-05

## 2021-02-08 ENCOUNTER — NON-APPOINTMENT (OUTPATIENT)
Age: 46
End: 2021-02-08

## 2021-02-10 ENCOUNTER — RX RENEWAL (OUTPATIENT)
Age: 46
End: 2021-02-10

## 2021-02-14 ENCOUNTER — RX RENEWAL (OUTPATIENT)
Age: 46
End: 2021-02-14

## 2021-02-16 ENCOUNTER — RX RENEWAL (OUTPATIENT)
Age: 46
End: 2021-02-16

## 2021-03-01 ENCOUNTER — NON-APPOINTMENT (OUTPATIENT)
Age: 46
End: 2021-03-01

## 2021-03-12 ENCOUNTER — RX RENEWAL (OUTPATIENT)
Age: 46
End: 2021-03-12

## 2021-03-22 ENCOUNTER — LABORATORY RESULT (OUTPATIENT)
Age: 46
End: 2021-03-22

## 2021-03-22 ENCOUNTER — APPOINTMENT (OUTPATIENT)
Dept: INFECTIOUS DISEASE | Facility: CLINIC | Age: 46
End: 2021-03-22
Payer: MEDICARE

## 2021-03-22 ENCOUNTER — NON-APPOINTMENT (OUTPATIENT)
Age: 46
End: 2021-03-22

## 2021-03-22 VITALS
SYSTOLIC BLOOD PRESSURE: 125 MMHG | RESPIRATION RATE: 16 BRPM | OXYGEN SATURATION: 97 % | HEART RATE: 83 BPM | WEIGHT: 213 LBS | DIASTOLIC BLOOD PRESSURE: 82 MMHG | BODY MASS INDEX: 33.43 KG/M2 | TEMPERATURE: 98.8 F | HEIGHT: 67 IN

## 2021-03-22 PROCEDURE — 99214 OFFICE O/P EST MOD 30 MIN: CPT

## 2021-03-22 PROCEDURE — 98966 PH1 ASSMT&MGMT NQHP 5-10: CPT | Mod: CR

## 2021-03-22 RX ORDER — DOXYCYCLINE HYCLATE 100 MG/1
100 CAPSULE ORAL TWICE DAILY
Qty: 20 | Refills: 0 | Status: COMPLETED | COMMUNITY
Start: 2020-11-09 | End: 2021-03-22

## 2021-03-22 RX ORDER — DOXYCYCLINE HYCLATE 100 MG/1
100 TABLET ORAL
Qty: 20 | Refills: 0 | Status: COMPLETED | COMMUNITY
Start: 2021-01-27

## 2021-03-22 NOTE — ASSESSMENT
[FreeTextEntry1] : HIV on Biktarvy, tolerating well\par Concerned with weight gain and GERD which he attributes to sertraline\par F/U with Dr Ventura regarding alternatives to sertraline\par Has had 2 periods with jump in weight 2017 after chemo and late 2018/2019 after starting sertraline and switch to Biktarvy\par Started sertraline Nov 2018\par Was on Biktarvy since July 2018 -\par Descovy/Tivicay Feb 2017- Sept 2017, Oct 2017-July 2018, \par Genvoya  Oct 2016- Feb 2017\par Usually weight 170-175\par Uses TUMs for heartburn\par cHECK h PYLORI and stool Ag\par No baseline major Uvaldo in case need to switch therapy, GERD complicates ARV options as RPV (Odefsey) may not be feasible. Avoiding TAF was also be an issue, H/O Hep B core ab\par Check Hep B core, SAg and PCR,  has negative Hep B S ab post vaccine (Heplisav)\par Vaccines: completed COVID antibody\par Check annual labs: CD4, VL, SMA, CBC\par Check TSH WITH WEIGHT GAIN\par Anal cancer: had HRA in Oct- negative\par Elevated indirect bili, H/O Gilbert's syndrome\par KS- needs follow up appt\par Recurrent cellulitis, saw PT, was told he would need a wrap that would make it itchy, and he declined, has a home device for 2 years to compress the leg.  Continue hibiclens and mupirocin\par

## 2021-03-22 NOTE — HISTORY OF PRESENT ILLNESS
[FreeTextEntry1] : HIV on Biktarvy\par Takes daily\par Tolerating well\par No missed doses this week\par \par \par Had cellulitis in Right leg\par Treated for staph and strep initially on doxycycline (partial response) and augmentin (good response), clinically improved\par Has recurrent cellulitis\par improved\par Uses hibiclens and exfoliation loofah to skin\par \par he has been on sertraline which attributes GERD and weight gain to use of sertraline\par Spoke with dietician\par He feels more hungry\par Drinks soda, diet coke.\par Started sertraline Nov 2018\par Was on Biktarvy since July 2018 -\par Descovy/Tivicay Feb 2017- Sept 2017, Oct 2017-July 2018, \par Genvoya  Oct 2016- Feb 2017\par \par Usually weight 170-175\par Uses TUMs for heartburn\par

## 2021-03-22 NOTE — REVIEW OF SYSTEMS
[Recent Weight Gain (___ Lbs)] : recent [unfilled] ~Ulb weight gain [Negative] : Heme/Lymph [___ # of Missed Doses in The Past Week] : [unfilled] doses missed in the past week  [FreeTextEntry7] : GERD

## 2021-03-23 DIAGNOSIS — Z78.9 OTHER SPECIFIED HEALTH STATUS: ICD-10-CM

## 2021-04-02 ENCOUNTER — NON-APPOINTMENT (OUTPATIENT)
Age: 46
End: 2021-04-02

## 2021-04-14 ENCOUNTER — RX RENEWAL (OUTPATIENT)
Age: 46
End: 2021-04-14

## 2021-05-13 ENCOUNTER — NON-APPOINTMENT (OUTPATIENT)
Age: 46
End: 2021-05-13

## 2021-05-13 ENCOUNTER — RX RENEWAL (OUTPATIENT)
Age: 46
End: 2021-05-13

## 2021-06-15 ENCOUNTER — RX RENEWAL (OUTPATIENT)
Age: 46
End: 2021-06-15

## 2021-06-21 NOTE — ED CDU PROVIDER INITIAL DAY NOTE - ASSESSMENT PLAN
[Routine On-Treatment] : a routine on-treatment visit for [Other: ___] : [unfilled] [Other: _____] : [unfilled] [Patient Declined  Services] : - None: Patient declined  services [FreeTextEntry4] : daughter, Safia [TWNoteComboBox1] : [place] [employer] Administration

## 2021-06-23 ENCOUNTER — NON-APPOINTMENT (OUTPATIENT)
Age: 46
End: 2021-06-23

## 2021-07-15 ENCOUNTER — NON-APPOINTMENT (OUTPATIENT)
Age: 46
End: 2021-07-15

## 2021-07-20 ENCOUNTER — RX RENEWAL (OUTPATIENT)
Age: 46
End: 2021-07-20

## 2021-07-21 ENCOUNTER — NON-APPOINTMENT (OUTPATIENT)
Age: 46
End: 2021-07-21

## 2021-08-09 ENCOUNTER — NON-APPOINTMENT (OUTPATIENT)
Age: 46
End: 2021-08-09

## 2021-08-19 ENCOUNTER — RX RENEWAL (OUTPATIENT)
Age: 46
End: 2021-08-19

## 2021-08-31 ENCOUNTER — NON-APPOINTMENT (OUTPATIENT)
Age: 46
End: 2021-08-31

## 2021-09-23 ENCOUNTER — RX RENEWAL (OUTPATIENT)
Age: 46
End: 2021-09-23

## 2021-09-27 ENCOUNTER — FORM ENCOUNTER (OUTPATIENT)
Age: 46
End: 2021-09-27

## 2021-09-28 ENCOUNTER — APPOINTMENT (OUTPATIENT)
Dept: INFECTIOUS DISEASE | Facility: CLINIC | Age: 46
End: 2021-09-28
Payer: MEDICARE

## 2021-09-28 VITALS
RESPIRATION RATE: 18 BRPM | TEMPERATURE: 97.6 F | WEIGHT: 205 LBS | HEIGHT: 67 IN | OXYGEN SATURATION: 99 % | BODY MASS INDEX: 32.18 KG/M2 | HEART RATE: 76 BPM | DIASTOLIC BLOOD PRESSURE: 76 MMHG | SYSTOLIC BLOOD PRESSURE: 138 MMHG

## 2021-09-28 PROCEDURE — 99214 OFFICE O/P EST MOD 30 MIN: CPT | Mod: 25

## 2021-09-28 PROCEDURE — G0008: CPT

## 2021-09-28 PROCEDURE — 90686 IIV4 VACC NO PRSV 0.5 ML IM: CPT

## 2021-09-28 RX ORDER — AMOXICILLIN AND CLAVULANATE POTASSIUM 875; 125 MG/1; MG/1
875-125 TABLET, COATED ORAL TWICE DAILY
Qty: 28 | Refills: 0 | Status: COMPLETED | COMMUNITY
Start: 2021-02-05 | End: 2021-09-28

## 2021-09-28 NOTE — HISTORY OF PRESENT ILLNESS
[FreeTextEntry1] : HIV on Biktarvy\par Takes daily in the mornings\par Tolerating well\par No missed doses this wek\par \par Had cellulitis of the right leg in April treated with augmentin and of the right middle finger on Sept 1, had doxycycline- saw NP at [facility]- did not help. Was very painful, swollen and blisters. was then given bactrim and it improved [Sexually Active] : The patient is not sexually active

## 2021-09-28 NOTE — ASSESSMENT
[FreeTextEntry1] : HIV on Biktarvy doing well, continue current meds\par Recurrent staph skin infections, continue hibiclens and mupirocin.\par Continue moisturizing of legs\par Has had lots of itching, scratches himself in his sleep, dry skin\par Has tried hot water, has soaked his foot epsom salts\par Has tried hydrating lotion, baby oil helps, hemp cream and eucalyptus\par Try calomine lotion, Has tried eucerin and aquaphor\par May try Vitamin E\par WEight loss 5 lbs, he is very active\par Vaccines: Flu shot today\par Check CD4, VL, CBC and SMA\par Check RPR was 1:1 post treatment\par Check COVID Jair IgG post Moderna- had borderline CD4 and may require 3rd dose\par Hepatitis B (core +) Ab+ after vaccine\par Vaccines UTD- flu shot today\par KS- seeing Daniel Wilkerson, needs CT end of this year- will refer back for F/U\par Pedal edema, saw PMR, refer to dermatology\par Anal Pap was negative 2020\par

## 2021-09-28 NOTE — PHYSICAL EXAM
[General Appearance - Alert] : alert [General Appearance - In No Acute Distress] : in no acute distress [Sclera] : the sclera and conjunctiva were normal [PERRL With Normal Accommodation] : pupils were equal in size, round, reactive to light [Extraocular Movements] : extraocular movements were intact [Outer Ear] : the ears and nose were normal in appearance [Oropharynx] : the oropharynx was normal with no thrush [Neck Appearance] : the appearance of the neck was normal [Neck Cervical Mass (___cm)] : no neck mass was observed [Jugular Venous Distention Increased] : there was no jugular-venous distention [Thyroid Diffuse Enlargement] : the thyroid was not enlarged [Auscultation Breath Sounds / Voice Sounds] : lungs were clear to auscultation bilaterally [Heart Rate And Rhythm] : heart rate was normal and rhythm regular [Heart Sounds Gallop] : no gallops [Heart Sounds] : normal S1 and S2 [Murmurs] : no murmurs [Heart Sounds Pericardial Friction Rub] : no pericardial rub [Full Pulse] : the pedal pulses are present [Edema] : there was no peripheral edema [Bowel Sounds] : normal bowel sounds [Abdomen Soft] : soft [Abdomen Tenderness] : non-tender [Abdomen Mass (___ Cm)] : no abdominal mass palpated [Costovertebral Angle Tenderness] : no CVA tenderness [No Palpable Adenopathy] : no palpable adenopathy [Skin Color & Pigmentation] : normal skin color and pigmentation [] : no rash [Deep Tendon Reflexes (DTR)] : deep tendon reflexes were 2+ and symmetric [Sensation] : the sensory exam was normal to light touch and pinprick [No Focal Deficits] : no focal deficits [Oriented To Time, Place, And Person] : oriented to person, place, and time [Affect] : the affect was normal

## 2021-10-05 ENCOUNTER — NON-APPOINTMENT (OUTPATIENT)
Age: 46
End: 2021-10-05

## 2021-10-08 ENCOUNTER — TRANSCRIPTION ENCOUNTER (OUTPATIENT)
Age: 46
End: 2021-10-08

## 2021-10-11 ENCOUNTER — TRANSCRIPTION ENCOUNTER (OUTPATIENT)
Age: 46
End: 2021-10-11

## 2021-10-21 ENCOUNTER — TRANSCRIPTION ENCOUNTER (OUTPATIENT)
Age: 46
End: 2021-10-21

## 2021-10-22 ENCOUNTER — RX RENEWAL (OUTPATIENT)
Age: 46
End: 2021-10-22

## 2021-10-28 ENCOUNTER — NON-APPOINTMENT (OUTPATIENT)
Age: 46
End: 2021-10-28

## 2021-11-03 ENCOUNTER — NON-APPOINTMENT (OUTPATIENT)
Age: 46
End: 2021-11-03

## 2021-11-04 ENCOUNTER — NON-APPOINTMENT (OUTPATIENT)
Age: 46
End: 2021-11-04

## 2021-11-09 ENCOUNTER — NON-APPOINTMENT (OUTPATIENT)
Age: 46
End: 2021-11-09

## 2021-11-11 ENCOUNTER — NON-APPOINTMENT (OUTPATIENT)
Age: 46
End: 2021-11-11

## 2021-11-18 ENCOUNTER — NON-APPOINTMENT (OUTPATIENT)
Age: 46
End: 2021-11-18

## 2021-11-19 ENCOUNTER — RX RENEWAL (OUTPATIENT)
Age: 46
End: 2021-11-19

## 2021-11-23 ENCOUNTER — RX RENEWAL (OUTPATIENT)
Age: 46
End: 2021-11-23

## 2021-11-26 ENCOUNTER — NON-APPOINTMENT (OUTPATIENT)
Age: 46
End: 2021-11-26

## 2021-12-20 ENCOUNTER — RX RENEWAL (OUTPATIENT)
Age: 46
End: 2021-12-20

## 2022-01-19 ENCOUNTER — RX RENEWAL (OUTPATIENT)
Age: 47
End: 2022-01-19

## 2022-02-06 ENCOUNTER — FORM ENCOUNTER (OUTPATIENT)
Age: 47
End: 2022-02-06

## 2022-02-07 ENCOUNTER — LABORATORY RESULT (OUTPATIENT)
Age: 47
End: 2022-02-07

## 2022-02-07 ENCOUNTER — APPOINTMENT (OUTPATIENT)
Dept: INFECTIOUS DISEASE | Facility: CLINIC | Age: 47
End: 2022-02-07
Payer: MEDICARE

## 2022-02-07 ENCOUNTER — RX RENEWAL (OUTPATIENT)
Age: 47
End: 2022-02-07

## 2022-02-07 ENCOUNTER — NON-APPOINTMENT (OUTPATIENT)
Age: 47
End: 2022-02-07

## 2022-02-07 VITALS
BODY MASS INDEX: 34.37 KG/M2 | TEMPERATURE: 97.6 F | SYSTOLIC BLOOD PRESSURE: 123 MMHG | OXYGEN SATURATION: 98 % | HEART RATE: 80 BPM | HEIGHT: 67 IN | DIASTOLIC BLOOD PRESSURE: 76 MMHG | WEIGHT: 219 LBS

## 2022-02-07 DIAGNOSIS — J45.990 EXERCISE INDUCED BRONCHOSPASM: ICD-10-CM

## 2022-02-07 LAB
25(OH)D3 SERPL-MCNC: 37 NG/ML
ALBUMIN SERPL ELPH-MCNC: 4.2 G/DL
ALP BLD-CCNC: 73 U/L
ALT SERPL-CCNC: 28 U/L
ANION GAP SERPL CALC-SCNC: 11 MMOL/L
APPEARANCE: CLEAR
AST SERPL-CCNC: 20 U/L
BACTERIA: NEGATIVE
BASOPHILS # BLD AUTO: 0.02 K/UL
BASOPHILS NFR BLD AUTO: 0.5 %
BILIRUB SERPL-MCNC: 1.8 MG/DL
BILIRUBIN URINE: NEGATIVE
BLOOD URINE: NORMAL
BUN SERPL-MCNC: 14 MG/DL
CALCIUM SERPL-MCNC: 9.3 MG/DL
CD3 CELLS # BLD: 582 /UL
CD3 CELLS NFR BLD: 75 %
CD3+CD4+ CELLS # BLD: 155 /UL
CD3+CD4+ CELLS NFR BLD: 20 %
CD3+CD4+ CELLS/CD3+CD8+ CLL SPEC: 0.37 RATIO
CD3+CD8+ CELLS # SPEC: 416 /UL
CD3+CD8+ CELLS NFR BLD: 53 %
CHLORIDE SERPL-SCNC: 102 MMOL/L
CO2 SERPL-SCNC: 23 MMOL/L
COLOR: NORMAL
CREAT SERPL-MCNC: 1.06 MG/DL
EOSINOPHIL # BLD AUTO: 0.13 K/UL
EOSINOPHIL NFR BLD AUTO: 3.3 %
ESTIMATED AVERAGE GLUCOSE: 97 MG/DL
GLUCOSE QUALITATIVE U: NEGATIVE
GLUCOSE SERPL-MCNC: 85 MG/DL
HBA1C MFR BLD HPLC: 5 %
HBV SURFACE AB SER QL: NONREACTIVE
HCT VFR BLD CALC: 47.5 %
HCV AB SER QL: NONREACTIVE
HCV S/CO RATIO: 0.28 S/CO
HGB BLD-MCNC: 16.1 G/DL
HYALINE CASTS: 0 /LPF
IMM GRANULOCYTES NFR BLD AUTO: 0.3 %
INR PPP: 1.1 RATIO
KETONES URINE: NEGATIVE
LEUKOCYTE ESTERASE URINE: NEGATIVE
LYMPHOCYTES # BLD AUTO: 0.76 K/UL
LYMPHOCYTES NFR BLD AUTO: 19.1 %
MAN DIFF?: NORMAL
MCHC RBC-ENTMCNC: 29.7 PG
MCHC RBC-ENTMCNC: 33.9 GM/DL
MCV RBC AUTO: 87.6 FL
MICROSCOPIC-UA: NORMAL
MONOCYTES # BLD AUTO: 0.59 K/UL
MONOCYTES NFR BLD AUTO: 14.8 %
NEUTROPHILS # BLD AUTO: 2.47 K/UL
NEUTROPHILS NFR BLD AUTO: 62 %
NITRITE URINE: NEGATIVE
PH URINE: 6.5
PLATELET # BLD AUTO: 183 K/UL
POTASSIUM SERPL-SCNC: 4.4 MMOL/L
PROT SERPL-MCNC: 7.7 G/DL
PROTEIN URINE: NEGATIVE
PSA SERPL-MCNC: 0.48 NG/ML
PT BLD: 12.9 SEC
RBC # BLD: 5.42 M/UL
RBC # FLD: 13.4 %
RED BLOOD CELLS URINE: 3 /HPF
SODIUM SERPL-SCNC: 136 MMOL/L
SPECIFIC GRAVITY URINE: 1.02
SQUAMOUS EPITHELIAL CELLS: 0 /HPF
TSH SERPL-ACNC: 1.5 UIU/ML
UROBILINOGEN URINE: NORMAL
WBC # FLD AUTO: 3.98 K/UL
WHITE BLOOD CELLS URINE: 0 /HPF

## 2022-02-07 PROCEDURE — 99214 OFFICE O/P EST MOD 30 MIN: CPT

## 2022-02-07 NOTE — HISTORY OF PRESENT ILLNESS
[FreeTextEntry1] : HIV on Biktarvy\par Takes daily\par Tolerating well\par No missed doses this week\par \par Gets wheezing on exertion\par He feels the new inhaler mechanism does not feel the dose being delivered [Sexually Active] : The patient is not sexually active

## 2022-02-07 NOTE — ASSESSMENT
[FreeTextEntry1] : HIV on Biktarvy doing well, continue current meds\par He is concerned with weight gain\par Will work on healthy eating exercise and refer to dietician, if unable to loose weight consider switch to Odefsey\par Labs from Sept 28, 2021 reviewed in [facility]\par H/O recurrent Staph skin infections, continue hibiclens and mupirocin\par Continue moisturizing legs/Epsom salts soaks for feet\par Vaccines: UTD got COVID booster and Flu shot\par Check annual labs\par CD4, VL, CBC, SMA, U/A\par Check Lipids, HgA1c, Vitamin D, QTF, HCV, U/A\par KS- follows with Daniel Wilkerson- was to have CT at end of 2021- he is switching docs- may switch to center in [place]- Dr Durbin had left\par Pedal edema was following with PMR, dermatology referral\par Anal Pap: had HRA- saw Dr Harris Jan 2021, refer for F/U\par Asthma- cold and exercise induced- had better response to albuterol in Blue/Gray container, now red. Refer to pulmonary re: potential switch of meds\par Has very low LDL and HDL- not on medication, liver function appears normal (INR, Platlets)\par

## 2022-02-08 LAB
C TRACH RRNA SPEC QL NAA+PROBE: NOT DETECTED
CHOLEST SERPL-MCNC: 160 MG/DL
HDLC SERPL-MCNC: 36 MG/DL
HIV1 RNA # SERPL NAA+PROBE: ABNORMAL
HIV1 RNA # SERPL NAA+PROBE: ABNORMAL COPIES/ML
LDLC SERPL CALC-MCNC: 107 MG/DL
N GONORRHOEA RRNA SPEC QL NAA+PROBE: NOT DETECTED
NONHDLC SERPL-MCNC: 125 MG/DL
SOURCE AMPLIFICATION: NORMAL
TRIGL SERPL-MCNC: 89 MG/DL
VIRAL LOAD INTERP: NORMAL
VIRAL LOAD LOG: ABNORMAL LG COP/ML

## 2022-02-09 LAB
M TB IFN-G BLD-IMP: NEGATIVE
QUANTIFERON TB PLUS MITOGEN MINUS NIL: 9.8 IU/ML
QUANTIFERON TB PLUS NIL: 0.2 IU/ML
QUANTIFERON TB PLUS TB1 MINUS NIL: 0.01 IU/ML
QUANTIFERON TB PLUS TB2 MINUS NIL: 0.01 IU/ML
T PALLIDUM AB SER QL IA: POSITIVE

## 2022-02-10 LAB
HBV DNA # SERPL NAA+PROBE: NOT DETECTED IU/ML
HEPB DNA PCR LOG: NOT DETECTED LOG10IU/ML

## 2022-02-13 LAB — HDV AB SER IA-ACNC: NEGATIVE

## 2022-02-14 ENCOUNTER — NON-APPOINTMENT (OUTPATIENT)
Age: 47
End: 2022-02-14

## 2022-02-15 ENCOUNTER — RX RENEWAL (OUTPATIENT)
Age: 47
End: 2022-02-15

## 2022-02-16 ENCOUNTER — NON-APPOINTMENT (OUTPATIENT)
Age: 47
End: 2022-02-16

## 2022-02-17 ENCOUNTER — RX RENEWAL (OUTPATIENT)
Age: 47
End: 2022-02-17

## 2022-02-18 ENCOUNTER — RX RENEWAL (OUTPATIENT)
Age: 47
End: 2022-02-18

## 2022-03-08 ENCOUNTER — NON-APPOINTMENT (OUTPATIENT)
Age: 47
End: 2022-03-08

## 2022-03-08 ENCOUNTER — TRANSCRIPTION ENCOUNTER (OUTPATIENT)
Age: 47
End: 2022-03-08

## 2022-03-09 ENCOUNTER — TRANSCRIPTION ENCOUNTER (OUTPATIENT)
Age: 47
End: 2022-03-09

## 2022-03-17 ENCOUNTER — RX RENEWAL (OUTPATIENT)
Age: 47
End: 2022-03-17

## 2022-03-18 ENCOUNTER — RX RENEWAL (OUTPATIENT)
Age: 47
End: 2022-03-18

## 2022-03-21 ENCOUNTER — OUTPATIENT (OUTPATIENT)
Dept: OUTPATIENT SERVICES | Facility: HOSPITAL | Age: 47
LOS: 1 days | Discharge: HOME | End: 2022-03-21

## 2022-03-21 DIAGNOSIS — C46.0 KAPOSI'S SARCOMA OF SKIN: ICD-10-CM

## 2022-03-21 DIAGNOSIS — Z86.14 PERSONAL HISTORY OF METHICILLIN RESISTANT STAPHYLOCOCCUS AUREUS INFECTION: Chronic | ICD-10-CM

## 2022-03-21 DIAGNOSIS — Z98.890 OTHER SPECIFIED POSTPROCEDURAL STATES: Chronic | ICD-10-CM

## 2022-03-21 DIAGNOSIS — Z90.89 ACQUIRED ABSENCE OF OTHER ORGANS: Chronic | ICD-10-CM

## 2022-03-21 DIAGNOSIS — Z86.19 PERSONAL HISTORY OF OTHER INFECTIOUS AND PARASITIC DISEASES: Chronic | ICD-10-CM

## 2022-03-22 ENCOUNTER — APPOINTMENT (OUTPATIENT)
Dept: CT IMAGING | Facility: CLINIC | Age: 47
End: 2022-03-22
Payer: MEDICARE

## 2022-03-22 ENCOUNTER — OUTPATIENT (OUTPATIENT)
Dept: OUTPATIENT SERVICES | Facility: HOSPITAL | Age: 47
LOS: 1 days | End: 2022-03-22
Payer: MEDICARE

## 2022-03-22 DIAGNOSIS — Z86.14 PERSONAL HISTORY OF METHICILLIN RESISTANT STAPHYLOCOCCUS AUREUS INFECTION: Chronic | ICD-10-CM

## 2022-03-22 DIAGNOSIS — Z98.890 OTHER SPECIFIED POSTPROCEDURAL STATES: Chronic | ICD-10-CM

## 2022-03-22 DIAGNOSIS — Z86.19 PERSONAL HISTORY OF OTHER INFECTIOUS AND PARASITIC DISEASES: Chronic | ICD-10-CM

## 2022-03-22 DIAGNOSIS — C46.9 KAPOSI'S SARCOMA, UNSPECIFIED: ICD-10-CM

## 2022-03-22 DIAGNOSIS — Z90.89 ACQUIRED ABSENCE OF OTHER ORGANS: Chronic | ICD-10-CM

## 2022-03-22 PROCEDURE — 71250 CT THORAX DX C-: CPT | Mod: 26,ME

## 2022-03-22 PROCEDURE — 71250 CT THORAX DX C-: CPT | Mod: ME

## 2022-03-22 PROCEDURE — G1004: CPT

## 2022-03-23 ENCOUNTER — OUTPATIENT (OUTPATIENT)
Dept: OUTPATIENT SERVICES | Facility: HOSPITAL | Age: 47
LOS: 1 days | Discharge: HOME | End: 2022-03-23

## 2022-03-23 ENCOUNTER — NON-APPOINTMENT (OUTPATIENT)
Age: 47
End: 2022-03-23

## 2022-03-23 ENCOUNTER — APPOINTMENT (OUTPATIENT)
Dept: RADIATION ONCOLOGY | Facility: CLINIC | Age: 47
End: 2022-03-23
Payer: MEDICARE

## 2022-03-23 ENCOUNTER — APPOINTMENT (OUTPATIENT)
Dept: HEMATOLOGY ONCOLOGY | Facility: CLINIC | Age: 47
End: 2022-03-23
Payer: MEDICARE

## 2022-03-23 VITALS
WEIGHT: 225 LBS | HEART RATE: 79 BPM | OXYGEN SATURATION: 97 % | HEIGHT: 67 IN | RESPIRATION RATE: 16 BRPM | SYSTOLIC BLOOD PRESSURE: 136 MMHG | BODY MASS INDEX: 35.31 KG/M2 | DIASTOLIC BLOOD PRESSURE: 86 MMHG

## 2022-03-23 DIAGNOSIS — Z98.890 OTHER SPECIFIED POSTPROCEDURAL STATES: Chronic | ICD-10-CM

## 2022-03-23 DIAGNOSIS — Z80.42 FAMILY HISTORY OF MALIGNANT NEOPLASM OF PROSTATE: ICD-10-CM

## 2022-03-23 DIAGNOSIS — Z86.19 PERSONAL HISTORY OF OTHER INFECTIOUS AND PARASITIC DISEASES: Chronic | ICD-10-CM

## 2022-03-23 DIAGNOSIS — C78.00 SECONDARY MALIGNANT NEOPLASM OF UNSPECIFIED LUNG: ICD-10-CM

## 2022-03-23 DIAGNOSIS — Z90.89 ACQUIRED ABSENCE OF OTHER ORGANS: Chronic | ICD-10-CM

## 2022-03-23 DIAGNOSIS — Z86.14 PERSONAL HISTORY OF METHICILLIN RESISTANT STAPHYLOCOCCUS AUREUS INFECTION: Chronic | ICD-10-CM

## 2022-03-23 PROCEDURE — 99204 OFFICE O/P NEW MOD 45 MIN: CPT | Mod: 25

## 2022-03-23 PROCEDURE — 99205 OFFICE O/P NEW HI 60 MIN: CPT

## 2022-03-23 NOTE — HISTORY OF PRESENT ILLNESS
[FreeTextEntry1] : This 47 year-old male presents for radiation medicine consultation accompanied by his mother Nahomy.  Mr. Catherine has been under treatment for AIDS by Dr. Perez after life-threatening presenting to the hospital in September 2016.   Patient recalls testing negative in 2013, and states he was also negative by Oraquick test in 04/2016.  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  He was treated by Dr. Durbin with chemotherapy for the disease between 11/2016 and 4/5/2018. \par Never had radiotherapy.  \par \par   After Dr. Durbin left the practice, the patient was evaluated for this diagnosis by Dr. Griffin, and then by Dr. Daniel Desouza November 6, 2020.  The patient has been under surveillance.\par \par 10/5/2016 -- 10/16/2016:  \par Admited to [facility]) with respiratory failure, initial diagnosis of AIDS. CF4 = 4, CF4/8 = 0/12, HIV RNA VL = 10218.   Admitted with WBC 1.2, Hb 11.6, , Na 123, Creat 1.33, ABG 7.52/28/56, CXR with opacity in RLL.  He was treated with antibiotics, BiPap,  and care in MICU.   LEFT and RIGHT leg swelling was noted, U/S negative for clot. CT angio showed no PE. Multifocal pneumonia noted on CT.  Positive findings for enterovirus, rhinovirus, RSV, and HIV, oropharyngeal thrush.  Culture showed S. pneumonia.  AFB negative in sputum to date.  Hepatitis B also found positive.   Viral load 98 IU/mL.  U/S showed gallstones but no cholecystitis.  Also positive for oropharyngeal thrush.  He was also found to have severe protein calorie malnutrition. \par \par 10/25/2016: \par Biopsy of skin lesions showing Kaposki's sarcoma.\par \par He presents today with chronic lymphedema of the right lower extremity.  He reports taking tramadol the chronic pain. He reports he was followed by PMR Dr. Ary Adams.  At her direction, he was using compression garment which he finds very difficult to wear.  \par \par \par

## 2022-03-23 NOTE — REASON FOR VISIT
[Other: ___] : [unfilled] [Consideration of Curative Therapy] : consideration of curative therapy for [Family Member] : family member [Other: _____] : [unfilled]

## 2022-03-23 NOTE — LETTER GREETING
[Dear Doctor] : Dear Doctor, [Consult Letter:] : Your patient, [unfilled] was seen in my office today for consultation. [Please see my note below.] : Please see my note below. [FreeTextEntry2] : Tobin Perez MD

## 2022-03-23 NOTE — REVIEW OF SYSTEMS
[Negative] : Allergic/Immunologic [de-identified] : unpigmented  nodularity over distal right lower extremity  [de-identified] : Lower extremity lymphedema R>L

## 2022-03-23 NOTE — LETTER CLOSING
[Consult Closing:] : Thank you for allowing me to participate in the care of this patient.  If you have any questions, please do not hesitate to contact me. [Sincerely yours,] : Sincerely yours, [FreeTextEntry3] : Kel Davalos MD\par Physician in Chief\par Department of Radiation Medicine\par [facility]\par [facility]\par \par  of Radiation Medicine\par [facility]\par at  [facility]\par \par Radiation \par [facility]/\par [facility] [place]\par [address]\par [place], [place] [address]\par \par Tel: [phone]\par Fax: [phone]\par

## 2022-03-23 NOTE — PHYSICAL EXAM
[Obese] : obese [Oriented To Time, Place, And Person] : oriented to person, place, and time [Normal] : oriented to person, place and time, the affect was normal, the mood was normal and not anxious [de-identified] : Right lower extremity chronic lymphedema [de-identified] :  small superficial unpigmented subcentimeter sessile nodularity of distal right  lower extremity [de-identified] : anxious

## 2022-03-23 NOTE — VITALS
[Maximal Pain Intensity: 7/10] : 7/10 [Least Pain Intensity: 3/10] : 3/10 [Pain Description/Quality: ___] : Pain description/quality: [unfilled] [Pain Duration: ___] : Pain duration: [unfilled] [Pain Interferes with ADLs] : Pain interferes with activities of daily living. [Opioid] : opioid [ECOG Performance Status: 2 - Ambulatory and capable of all self care but unable to carry out any work activities] : Performance Status: 2 - Ambulatory and capable of all self care but unable to carry out any work activities. Up and about more than 50% of waking hours [Date: ____________] : Patient's last distress assessment performed on [unfilled]. [8 - Distress Level] : Distress Level: 8 [Referred Patient  to social work for follow-up] : Patient was referred to social work for follow-up [Pain Location: ___] : Pain Location: [unfilled] [70: Cares for self; unalbe to carry on normal activity or do active work.] : 70: Cares for self; unable to carry on normal activity or do active work.

## 2022-03-25 NOTE — ADDENDUM
[FreeTextEntry1] : Documented by Zayra Navarro acting as scribe for Dr. Larson on 03/23/2022 \par \par All Medical record entries made by the Scribe were at my, Dr. Larson's, direction and personally dictated by me on 03/23/2022 . I have reviewed the chart and agree that the record accurately reflects my personal performance of the history, physical exam, assessment and plan. I have also personally directed, reviewed, and agreed with the discharge instructions.\par

## 2022-03-25 NOTE — PHYSICAL EXAM
[Normal] : affect appropriate [de-identified] : Right lower extremity swelling and knee below some induration thickening of skin more medial compared to lateral no pedal pitting edemal, itchiness. left lower extremity, chronic redness, no swelling no edema [de-identified] : No active skin lesions

## 2022-03-25 NOTE — HISTORY OF PRESENT ILLNESS
[Disease: _____________________] : Disease: [unfilled] [AJCC Stage: ____] : AJCC Stage: [unfilled] [de-identified] : 47 year old male presenting to office for oncologic care. He is formerly under the care of Dr. Esequiel Durbin (medical oncology) and Dr. Daniel Desouza. Patient was seen with life threatening presentation in 10/2016, of AIDS and complicating infections. His KS has come to the fore in the weeks after his diagnosis and treatment affecting RLE, LLE, lungs at a minimum. \par \par 2016: 10-15 lb weight loss, night sweats, dysphagia. Notes he recalls little or nothing of the last two weeks prior to hospitalization, being taken by water ambulance from [place] to hospital \par \par 10/5/2016-10/16/2016: Admit [facility] with respiratory failure, initial diagnosis of AIDS. CF4 = 4, CF4/8 = 0/12, HIV RNA VL = 71767. Recalls tested negative in , and states was negative by Oraquick test in 2016. Admitted with WBC 1.2, Hb 11.6, , Na 123, Creat 1.33, ABG 7.52/28/56, with CXR with opacity in RLL. Rx abx, Rx Bipap and care in MICU. LEFT and RIGHT  leg swelling noted, U/S negative for clot. CT angio showed no PE. Multifocal pneumonia noted on CT. Found with enterovirus, rhinovirus, RSV positive, and HIV positive. Cx showed S. pneumonia. AFB negative in sputa to date. Hep B also found positive, viral load 98 IU/mL.  U/S showed gallstones but no cholecystitis. Also positive for oropharyngeal thrush.  Found also with severe protein chen malnutrition, suggested supplements.  \par \par 10/22/2016: Readmit with increasing RIGHT lower extremity pain and swelling. Multifocal lesions noted worsening over shin > posteriorly. RIGHT foot with substantial edema causing continuous unbearable pain.  \par \par 10/31/2016: Eventually Bx skin lesions showing KS. Rx x 1 PLD/Doxil before DC to rehab, which per patient and mom's report had been an utter disaster in terms of transition of care with medications and diagnoses incorrect at multiple junctures during stay. \par \par 16 : Reports R lower extremity swelling is much improved.  R lower extremity feels better, no open lesions. No other lesions noted. He is improving in terms of activity and now at home after being DC'd from rehab.\par \par 16 : Cycle 3 D 1 of PLD Dox. Reports felling well and his R leg is much improved after his 2nd cycle. Today his WBC is 2.7 and ANC of 300. He reports that he had an episode of fever on 16, 101F but did not report. He has not had any fevers since then and does not have chills, malaise or other S/S of infection. \par PLD/DOX held for 2 weeks for recovery.\par \par : Was seen by Dr. Alonzo at ID clinic for AIDS and Hep B. DDI noted with PLD/DOX and Tenofovir (CY inhibition) in Lexicomp. Tenofovir being changed to Descovy and Tivicay in order to avoid drug-interactions. This may have prolonged neutropenia and PLD/Dox to stay longer. His PLD/DOX is dose reduced to 35 mg/m2 today as well. \par \par 17: Cycle 4 today.  Cycle 3 dose reduced to 35 mg/m2 secondary to NTP and HIV medication was changed. Abscess under R axilla went to urgent center. Was was on clindamycin for 7 days(11). Now completely resolved.   He would like to go to a "salt steam house" for relaxation and breathing. \par \par 3/16/17: Here for cycle 5 of PLDox. He had another abscess under his right arm, completed clindamycin x 7 days. Continues to be followed closely with Dr. Alonzo(ID) for HIV. \par Weight gain on therapy, otherwise feels well. \par \par 17: Nolan comes in for cycle 6 of therapy. He had difficult time with his last cycle fatigue which lasted for about 4 days. He is fully recovered and feels well. He has gained a considerable amount of weight since starting therapy. Now moved back to [place] and walked 8 miles yesterday. No SOB, JULIEN, pain. \par \par 17 : Nolan is here for a follow up. He had CT chest on 17 showing significant improvement.  He reports R leg swelling worsening over a week and there is a small cut in the anterior portion 3 mm, oozing serous fluid and contralateral leg edema for 2 weeks with some read patches along the anterior portion of the shin, tender to palpate. No identifiable injury on the skin.  Saw Dr. Silva for follow up this morning. Currently on Descovy and Tivicay for HIV.\par \par 17 : Nolan comes  in today for a follow up. Completed 6 cycles of PLD/Doxil in  and is back in [facility]. Has had erythematous bilateral LE with edema on his last visit, we decided to monitor rather than biopsy and additional tests.   \par \par 10/6/17: Here for a follow up. He had acute hep A with elevated LFTs in September. He had fatigue at that time and refused to go to the ED as he was on [place]. His blood work from ID showed elevated LFTs.  CT chest today - results not on  as of 12 noon. He also notes pain in the sole of the foot and increased edema causing pain.\par \par 10/31/17: Nolan's LEFT leg and foot erythema and edema has improved only slightly since our last visit with ABX and now here for further discussion regarding management of KS complicating AIDS, with substantial symptoms from the damage the tumor has done to the soft tissue of the right > left lower extremity. CT chest showed stable changes with small pulmonary nodules and hypertension. Taking tramadol for pain. Pain in rated 3 on the tramadol. \par \par 2018:  Recent erythema right LE again. This dissipated within a few days. There was no warmth or erythema to suggest a cellulitis.  He notes increased xerosis and bumpiness to the skin and is out of triamcinolone and urea creams. He noted "PMS like symptoms" a day or two after chemotherapy, alleviated with acupuncture but likely related to pre treatment dexamethasone. \par \par 18 : Here for a follow up and for cycle 10 of PLD/Dox. He is due to go back to [place] to the summer after this Rx. He had substantial agitation at his ID visit, and was referred to a psychiatrist, who Rx clonazepam 0.5 bid with good effect. He had heartburn and agitation after last cycle of Rx, ? related to dexamethasone. He has continued leg pain and has heard of cannabis oil for the leg for pain. 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 His leg looks better than it has since diagnosis, however. He still has occasional pain and has skin issues such as cellulitis from time to time.  \par \par 12/3/2018: RTC feeling well, had tick bite this summer but no cellulitis. Still with anxiety disorder but finds present Rx with clonazepam and zolpidem helping overall. Occ respiratory issues with cold weather coming on, and also has some muscle fatigue / cramping in legs after working at the end of a day. \par \par 06/10/2019: Has baseline dyspnea with exertion and peripheral edema, but doing relatively well. He has done well with lymphedema therapy with compression stockings and other intervention since his surgical visit 2019. Skin is still friable and prone to ooze or bleed with injury but there is no violaceous recurrence to suggest KS recurrence. \par \par 19 : Here for a follow up. Doing better overall with lymphedema therapy, but still with residual scarring of legs / chronic edematous changes in RLE > LLE. A couple of mosquito bites have appeared to turn into early cellulitis over the summer but he has managed with topical Abx. \par \par 2020 transfer care to Dr. Griffin \par \par 2020 transfer care to Dr. Desouza. Plan to observe off systemic therapy and unless evidence of KS recurrence then no need for systemic chemo\par \par  [de-identified] : Patient presents for an initial consultation with mother\par \par Patient reports HIV is under control, no new problems, feels relatively well\par Denies any acute complaints \par Sees Dr. Mccoy q 6months last visit 2020 labs stable \par Takes Vitamin D daily, tramadol prn, doxy prn\par Uses baby oil for dryness of left lower extremity,\par Chronic lymphoedema see's specialists PMNR Dr. Ary Adams , received compression device has not had f/u and doesn't use consistently. \par \par \par CT Chest October 2020\par Impressions:\par 1. Stable 2-4mm bilateral scattered pulmonary nodules. \par 2. Stable volume loss and linear atelectasis/ scar in the right middle lobe with mild persistent bronchitis. Mild bilateral bronchial wall thickening

## 2022-03-25 NOTE — ASSESSMENT
[FreeTextEntry1] : 47 year old male presenting to office for oncologic care. He is formerly under the care of Dr. Esequiel Durbin (medical oncology) and Dr. Daniel Desouza. Patient was seen with life threatening presentation in 10/2016, of AIDS and complicating infections. His KS has come to the fore in the weeks after his diagnosis and treatment affecting RLE, LLE, lungs at a minimum. \par \par -S/p 10 cycles of liposomal doxorubicin (DOXO) completed April 2018. Pt has been monitored off of tx since then. \par -Pt had CT Chest ordered by Dr. Mccoy done on 3/22/22  will obtain results and give pt call with results\par -Will continue to monitor and surveil patient \par  - Annual CT next due on 3/2023\par -Saw Dr. Davalos this morning, advised fu in 3 months \par -fu in 6 months \par - labs per ID\par

## 2022-03-31 ENCOUNTER — NON-APPOINTMENT (OUTPATIENT)
Age: 47
End: 2022-03-31

## 2022-03-31 ENCOUNTER — APPOINTMENT (OUTPATIENT)
Dept: PULMONOLOGY | Facility: CLINIC | Age: 47
End: 2022-03-31
Payer: MEDICARE

## 2022-03-31 VITALS — HEART RATE: 54 BPM | OXYGEN SATURATION: 98 % | RESPIRATION RATE: 16 BRPM

## 2022-03-31 VITALS
TEMPERATURE: 98.4 F | HEART RATE: 82 BPM | HEIGHT: 67 IN | BODY MASS INDEX: 35.63 KG/M2 | OXYGEN SATURATION: 97 % | WEIGHT: 227 LBS

## 2022-03-31 PROCEDURE — 94726 PLETHYSMOGRAPHY LUNG VOLUMES: CPT

## 2022-03-31 PROCEDURE — ZZZZZ: CPT

## 2022-03-31 PROCEDURE — 99204 OFFICE O/P NEW MOD 45 MIN: CPT | Mod: 25

## 2022-03-31 PROCEDURE — 94010 BREATHING CAPACITY TEST: CPT

## 2022-03-31 PROCEDURE — 94729 DIFFUSING CAPACITY: CPT

## 2022-03-31 NOTE — HISTORY OF PRESENT ILLNESS
[TextBox_4] : 47-year-old male with history of HIV, well-controlled on Biktarvy with undetectable viral load, former smoker who presents for evaluation of exertional dyspnea.\par HIV was diagnosed 5 years ago when he presented to [facility] with an acute right lower lobe pneumonia complicated by empyema and need for chest tube placement.  At that time he was also diagnosed with KS of the right lower extremity.  He received treatment for KS and is now stable.  History of acute pneumonia 5 years ago, also diagnosed with KS, which is stable also in leg and foot.  \par \par Shortness of breath is primarily with exertion, he is unable to climb stairs without dyspnea and notes occasional wheezing.  Mom has accompanied him to today's visit and she reports a history of asthmatic bronchitis as a child.  He has a Ventolin inhaler which he is using up to 3 times per day and usually on a daily basis.  He has not had to use steroids.  \par \par Used to smoke- quit in 2016.  Smoked for 20 years 1 PPD. No vaping.  \par \par

## 2022-03-31 NOTE — ASSESSMENT
[FreeTextEntry1] : 47 year old male, former 20pack year smoker, quit 5 years ago, with HIV well controlled with KS, history of severe pneumonia in 2016 requiring hospitalization.  History of asthmatic bronchitis as a child.  Currently with exertional dyspnea, using albuterol several times a day for dyspnea, chest tightness.  NOtes occasional wheezing as well.\par \par PE today is remarkable only for edema of RLE.  VSS lungs clear, O=P clear without thrush \par \par PFTs reveal a moderately severe obstructive ventilatory defect with mild reduction in DLCO.  BD study was not done.\par CT Chest from 3/22/22 reviewed by me personally.  There is no adenopathy.  He has RML atelectasis and scarring, multiple scattered bilateral calcified granumlomas and stable 4 mm noncalcified nodule noted.  \par \par Impression : ASthma- COPD with evidence of obstructive pattern on spirometry.\par \par Plan:\par Start Breo Ellipta 100-25 daily, rinse mouth after use\par albuterol as needed\par meds ordered\par F/U in 4-6 months.

## 2022-03-31 NOTE — PHYSICAL EXAM
[No Acute Distress] : no acute distress [Normal Oropharynx] : normal oropharynx [Normal Appearance] : normal appearance [No Neck Mass] : no neck mass [Normal Rate/Rhythm] : normal rate/rhythm [Normal S1, S2] : normal s1, s2 [No Murmurs] : no murmurs [No Resp Distress] : no resp distress [Clear to Auscultation Bilaterally] : clear to auscultation bilaterally [No Abnormalities] : no abnormalities [Normal Gait] : normal gait [Oriented x3] : oriented x3 [Normal Affect] : normal affect [TextBox_105] : RLE with swelling and discoloration

## 2022-04-05 ENCOUNTER — NON-APPOINTMENT (OUTPATIENT)
Age: 47
End: 2022-04-05

## 2022-04-06 ENCOUNTER — NON-APPOINTMENT (OUTPATIENT)
Age: 47
End: 2022-04-06

## 2022-04-11 ENCOUNTER — TRANSCRIPTION ENCOUNTER (OUTPATIENT)
Age: 47
End: 2022-04-11

## 2022-04-13 ENCOUNTER — TRANSCRIPTION ENCOUNTER (OUTPATIENT)
Age: 47
End: 2022-04-13

## 2022-04-18 ENCOUNTER — RX RENEWAL (OUTPATIENT)
Age: 47
End: 2022-04-18

## 2022-04-21 ENCOUNTER — RX RENEWAL (OUTPATIENT)
Age: 47
End: 2022-04-21

## 2022-05-17 ENCOUNTER — RX RENEWAL (OUTPATIENT)
Age: 47
End: 2022-05-17

## 2022-05-24 ENCOUNTER — NON-APPOINTMENT (OUTPATIENT)
Age: 47
End: 2022-05-24

## 2022-05-25 ENCOUNTER — NON-APPOINTMENT (OUTPATIENT)
Age: 47
End: 2022-05-25

## 2022-06-14 ENCOUNTER — RX RENEWAL (OUTPATIENT)
Age: 47
End: 2022-06-14

## 2022-06-23 ENCOUNTER — RX RENEWAL (OUTPATIENT)
Age: 47
End: 2022-06-23

## 2022-06-24 ENCOUNTER — RX RENEWAL (OUTPATIENT)
Age: 47
End: 2022-06-24

## 2022-06-29 ENCOUNTER — APPOINTMENT (OUTPATIENT)
Dept: RADIATION ONCOLOGY | Facility: CLINIC | Age: 47
End: 2022-06-29

## 2022-06-29 VITALS
DIASTOLIC BLOOD PRESSURE: 82 MMHG | WEIGHT: 215 LBS | SYSTOLIC BLOOD PRESSURE: 141 MMHG | BODY MASS INDEX: 33.67 KG/M2 | OXYGEN SATURATION: 98 % | RESPIRATION RATE: 16 BRPM | HEART RATE: 81 BPM

## 2022-06-29 PROCEDURE — 99213 OFFICE O/P EST LOW 20 MIN: CPT

## 2022-06-29 NOTE — HISTORY OF PRESENT ILLNESS
[FreeTextEntry1] : This 47 year-old male presents for radiation medicine follow-up accompanied by his mother Nahomy.  Mr. Catherine has been under treatment for AIDS by Dr. Perez after life-threatening presentation to the hospital in September 2016.   Patient recalls testing negative in 2013, and states he was also negative by Oraquick test in 04/2016.  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  He was treated by Dr. Durbin with chemotherapy for the disease between 11/2016 and 4/5/2018. \par Never had radiotherapy.  \par \par After Dr. Durbin left the practice, the patient was evaluated for this diagnosis by Dr. Griffin, and then by Dr. Daniel Desouza November 6, 2020.  The patient has been under surveillance.\par \par 10/5/2016 -- 10/16/2016:  \par Admited to [facility] with respiratory failure, initial diagnosis of AIDS. CF4 = 4, CF4/8 = 0/12, HIV RNA VL = [id number].   Admitted with WBC 1.2, Hb 11.6, , Na 123, Creat 1.33, ABG 7.52/28/56, CXR with opacity in RLL.  He was treated with antibiotics, BiPap,  and care in MICU.   LEFT and RIGHT leg swelling was noted, U/S negative for clot. CT angio showed no PE. Multifocal pneumonia noted on CT.  Positive findings for enterovirus, rhinovirus, RSV, and HIV, oropharyngeal thrush.  Culture showed S. pneumonia.  AFB negative in sputum to date.  Hepatitis B also found positive.   Viral load 98 IU/mL.  U/S showed gallstones but no cholecystitis.  Also positive for oropharyngeal thrush.  He was also found to have severe protein calorie malnutrition. \par \par 10/25/2016: \par Biopsy of skin lesions showing Kaposki's sarcoma.\par \par He presents today with chronic lymphedema of the right lower extremity.  He reports taking tramadol the chronic pain. He reports he was followed by PMR Dr. Ary Adams.  At her direction, he was using compression garment which he finds very difficult to wear.  He reports more recently has been using compression devices. \par \par Reports improvement in breathing since starting Breo Elipta.  B/L lower extremities with edema that waxes and wanes R>L.\par \par \par

## 2022-06-29 NOTE — REASON FOR VISIT
[Routine Follow-Up] : routine follow-up visit for [Other: ___] : [unfilled] [Parent] : parent [Other: _____] : [unfilled]

## 2022-06-29 NOTE — LETTER CLOSING
[Consult Closing:] : Thank you for allowing me to participate in the care of this patient.  If you have any questions, please do not hesitate to contact me. [Sincerely yours,] : Sincerely yours, [FreeTextEntry3] : Kel Davalos MD\par Physician in Chief\par Department of Radiation Medicine\par [facility] [place]\par [facility] [place]\par \par  of Radiation Medicine\par [facility]\par at  [facility]\par \par Radiation \par [facility]/\par [facility] [place]\par [address]\par [place], [place] [address]\par \par Tel: [phone]\par Fax: [phone]\par

## 2022-06-29 NOTE — REVIEW OF SYSTEMS
[Anxiety] : anxiety [Negative] : Respiratory [de-identified] : unpigmented  nodularity over distal right lower extremity ; pruritis , greatest in right leg  [de-identified] : Lower extremity lymphedema R>L [FreeTextEntry1] : AIDS

## 2022-06-29 NOTE — DISEASE MANAGEMENT
[Clinical] : TNM Stage: c [IV] : IV [FreeTextEntry4] : Kaposi's sarcoma [TTNM] : x [NTNM] : x [MTNM] : 1

## 2022-06-29 NOTE — PHYSICAL EXAM
[Obese] : obese [Normal] : no focal deficits [de-identified] : Right lower extremity chronic lymphedema [de-identified] :  small superficial unpigmented subcentimeter sessile nodularity of distal right  lower extremity, few with scabbing/excoriation [de-identified] : anxious

## 2022-06-29 NOTE — VITALS
[Maximal Pain Intensity: 7/10] : 7/10 [Least Pain Intensity: 3/10] : 3/10 [Pain Description/Quality: ___] : Pain description/quality: [unfilled] [Pain Duration: ___] : Pain duration: [unfilled] [Pain Location: ___] : Pain Location: [unfilled] [Pain Interferes with ADLs] : Pain interferes with activities of daily living. [Opioid] : opioid [Date: ____________] : Patient's last distress assessment performed on [unfilled]. [8 - Distress Level] : Distress Level: 8 [Referred Patient  to social work for follow-up] : Patient was referred to social work for follow-up [80: Normal activity with effort; some signs or symptoms of disease.] : 80: Normal activity with effort; some signs or symptoms of disease.  [ECOG Performance Status: 1 - Restricted in physically strenuous activity but ambulatory and able to carry out work of a light or sedentary nature] : Performance Status: 1 - Restricted in physically strenuous activity but ambulatory and able to carry out work of a light or sedentary nature, e.g., light house work, office work

## 2022-06-29 NOTE — DATA REVIEWED
[FreeTextEntry1] : pathology.\par labs\par imaging studies: CT chest March 22, 2022\par medical record\par

## 2022-07-12 ENCOUNTER — RX RENEWAL (OUTPATIENT)
Age: 47
End: 2022-07-12

## 2022-07-12 ENCOUNTER — TRANSCRIPTION ENCOUNTER (OUTPATIENT)
Age: 47
End: 2022-07-12

## 2022-08-04 ENCOUNTER — NON-APPOINTMENT (OUTPATIENT)
Age: 47
End: 2022-08-04

## 2022-08-08 ENCOUNTER — FORM ENCOUNTER (OUTPATIENT)
Age: 47
End: 2022-08-08

## 2022-08-09 ENCOUNTER — APPOINTMENT (OUTPATIENT)
Dept: INFECTIOUS DISEASE | Facility: CLINIC | Age: 47
End: 2022-08-09

## 2022-08-09 VITALS
TEMPERATURE: 98.6 F | SYSTOLIC BLOOD PRESSURE: 130 MMHG | BODY MASS INDEX: 33.59 KG/M2 | HEIGHT: 67 IN | DIASTOLIC BLOOD PRESSURE: 78 MMHG | OXYGEN SATURATION: 98 % | WEIGHT: 214 LBS | HEART RATE: 80 BPM

## 2022-08-09 PROCEDURE — 99214 OFFICE O/P EST MOD 30 MIN: CPT

## 2022-08-09 RX ORDER — DEXTROAMPHETAMINE SULFATE 10 MG/1
10 CAPSULE, EXTENDED RELEASE ORAL
Qty: 30 | Refills: 0 | Status: COMPLETED | COMMUNITY
Start: 2022-03-06

## 2022-08-09 RX ORDER — ADHESIVE TAPE 3"X 2.3 YD
50 MCG TAPE, NON-MEDICATED TOPICAL
Qty: 30 | Refills: 1 | Status: COMPLETED | COMMUNITY
Start: 2017-12-13 | End: 2022-08-09

## 2022-08-09 RX ORDER — ALBUTEROL SULFATE 90 UG/1
108 (90 BASE) AEROSOL, METERED RESPIRATORY (INHALATION)
Qty: 1 | Refills: 5 | Status: COMPLETED | COMMUNITY
Start: 2022-03-31 | End: 2022-08-09

## 2022-08-09 RX ORDER — ALBUTEROL SULFATE 90 UG/1
108 (90 BASE) INHALANT RESPIRATORY (INHALATION)
Qty: 18 | Refills: 0 | Status: COMPLETED | COMMUNITY
Start: 2020-10-14 | End: 2022-08-09

## 2022-08-09 RX ORDER — FLUTICASONE PROPIONATE AND SALMETEROL 100; 50 UG/1; UG/1
100-50 POWDER RESPIRATORY (INHALATION)
Qty: 1 | Refills: 4 | Status: COMPLETED | COMMUNITY
Start: 2022-04-06 | End: 2022-08-09

## 2022-08-09 RX ORDER — ALBUTEROL SULFATE 90 UG/1
108 (90 BASE) AEROSOL, METERED RESPIRATORY (INHALATION)
Qty: 1 | Refills: 1 | Status: COMPLETED | COMMUNITY
Start: 2018-02-26 | End: 2022-08-09

## 2022-08-09 RX ORDER — UBIDECARENONE/VIT E ACET 100MG-5
50 MCG CAPSULE ORAL
Qty: 30 | Refills: 1 | Status: COMPLETED | COMMUNITY
Start: 2019-01-30 | End: 2022-08-09

## 2022-08-09 NOTE — ASSESSMENT
[FreeTextEntry1] : HIV on Biktarvy\par Continue current meds\par Check second half labs: CD4, VL, CBC, SMA, Cystatin C GFR\par KS- post chemo/RT- seen by Heme/oc and RT- no therapy now for post treatment nodular skin changes, continue to monitor\par Weight loss 16lbs- cut out soda, using crystal light\par Vaccines- got 1st JYnneos, second scheduled. Others UTD\par Chronic Hep B- check AFP and liver scan, and Hep PCR\par Colonoscopy: No family history, will referred\par Anal Pap: HRA Jan 2021- F/U\par Dental: Full dentures\par Optho- this fall

## 2022-08-09 NOTE — HISTORY OF PRESENT ILLNESS
[FreeTextEntry1] : HIV on Biktarvy\par Takes daily in the mornings\par Tolerating well\par No missed doses this week\par \par \par Has been feeling well\par SOB with humidity, adjusted to Breo Ellipta and albuterol for COPD [Sexually Active] : The patient is not sexually active

## 2022-08-09 NOTE — PHYSICAL EXAM
[General Appearance - Alert] : alert [General Appearance - In No Acute Distress] : in no acute distress [Sclera] : the sclera and conjunctiva were normal [PERRL With Normal Accommodation] : pupils were equal in size, round, reactive to light [Extraocular Movements] : extraocular movements were intact [Outer Ear] : the ears and nose were normal in appearance [Oropharynx] : the oropharynx was normal with no thrush [Neck Appearance] : the appearance of the neck was normal [Neck Cervical Mass (___cm)] : no neck mass was observed [Jugular Venous Distention Increased] : there was no jugular-venous distention [Thyroid Diffuse Enlargement] : the thyroid was not enlarged [Auscultation Breath Sounds / Voice Sounds] : lungs were clear to auscultation bilaterally [Heart Rate And Rhythm] : heart rate was normal and rhythm regular [Heart Sounds] : normal S1 and S2 [Heart Sounds Gallop] : no gallops [Murmurs] : no murmurs [Heart Sounds Pericardial Friction Rub] : no pericardial rub [Full Pulse] : the pedal pulses are present [Edema] : there was no peripheral edema [Bowel Sounds] : normal bowel sounds [Abdomen Soft] : soft [Abdomen Tenderness] : non-tender [Abdomen Mass (___ Cm)] : no abdominal mass palpated [Costovertebral Angle Tenderness] : no CVA tenderness [No Palpable Adenopathy] : no palpable adenopathy [Musculoskeletal - Swelling] : no joint swelling [Nail Clubbing] : no clubbing  or cyanosis of the fingernails [Motor Tone] : muscle strength and tone were normal [Skin Color & Pigmentation] : normal skin color and pigmentation [] : no rash [FreeTextEntry1] : Induration, thickend skin with nodularity of LE R>L [Deep Tendon Reflexes (DTR)] : deep tendon reflexes were 2+ and symmetric [Sensation] : the sensory exam was normal to light touch and pinprick [No Focal Deficits] : no focal deficits [Oriented To Time, Place, And Person] : oriented to person, place, and time [Affect] : the affect was normal

## 2022-08-15 ENCOUNTER — RX RENEWAL (OUTPATIENT)
Age: 47
End: 2022-08-15

## 2022-09-08 ENCOUNTER — NON-APPOINTMENT (OUTPATIENT)
Age: 47
End: 2022-09-08

## 2022-09-14 ENCOUNTER — RX RENEWAL (OUTPATIENT)
Age: 47
End: 2022-09-14

## 2022-09-15 ENCOUNTER — RX RENEWAL (OUTPATIENT)
Age: 47
End: 2022-09-15

## 2022-09-21 ENCOUNTER — TRANSCRIPTION ENCOUNTER (OUTPATIENT)
Age: 47
End: 2022-09-21

## 2022-09-22 ENCOUNTER — TRANSCRIPTION ENCOUNTER (OUTPATIENT)
Age: 47
End: 2022-09-22

## 2022-09-22 ENCOUNTER — NON-APPOINTMENT (OUTPATIENT)
Age: 47
End: 2022-09-22

## 2022-09-23 ENCOUNTER — NON-APPOINTMENT (OUTPATIENT)
Age: 47
End: 2022-09-23

## 2022-10-04 ENCOUNTER — OUTPATIENT (OUTPATIENT)
Dept: OUTPATIENT SERVICES | Facility: HOSPITAL | Age: 47
LOS: 1 days | Discharge: HOME | End: 2022-10-04

## 2022-10-04 DIAGNOSIS — Z86.19 PERSONAL HISTORY OF OTHER INFECTIOUS AND PARASITIC DISEASES: Chronic | ICD-10-CM

## 2022-10-04 DIAGNOSIS — Z90.89 ACQUIRED ABSENCE OF OTHER ORGANS: Chronic | ICD-10-CM

## 2022-10-04 DIAGNOSIS — C46.0 KAPOSI'S SARCOMA OF SKIN: ICD-10-CM

## 2022-10-04 DIAGNOSIS — Z86.14 PERSONAL HISTORY OF METHICILLIN RESISTANT STAPHYLOCOCCUS AUREUS INFECTION: Chronic | ICD-10-CM

## 2022-10-04 DIAGNOSIS — Z98.890 OTHER SPECIFIED POSTPROCEDURAL STATES: Chronic | ICD-10-CM

## 2022-10-07 ENCOUNTER — RESULT REVIEW (OUTPATIENT)
Age: 47
End: 2022-10-07

## 2022-10-07 ENCOUNTER — APPOINTMENT (OUTPATIENT)
Dept: HEMATOLOGY ONCOLOGY | Facility: CLINIC | Age: 47
End: 2022-10-07

## 2022-10-07 VITALS
BODY MASS INDEX: 33.2 KG/M2 | DIASTOLIC BLOOD PRESSURE: 86 MMHG | SYSTOLIC BLOOD PRESSURE: 135 MMHG | HEART RATE: 88 BPM | WEIGHT: 212 LBS

## 2022-10-07 LAB
BASOPHILS # BLD AUTO: 0 K/UL — SIGNIFICANT CHANGE UP (ref 0–0.2)
BASOPHILS NFR BLD AUTO: 0.8 % — SIGNIFICANT CHANGE UP (ref 0–2)
EOSINOPHIL # BLD AUTO: 0.3 K/UL — SIGNIFICANT CHANGE UP (ref 0–0.5)
EOSINOPHIL NFR BLD AUTO: 4.3 % — SIGNIFICANT CHANGE UP (ref 0–6)
HCT VFR BLD CALC: 48.2 % — SIGNIFICANT CHANGE UP (ref 39–50)
HGB BLD-MCNC: 15.8 G/DL — SIGNIFICANT CHANGE UP (ref 13–17)
LYMPHOCYTES # BLD AUTO: 1.4 K/UL — SIGNIFICANT CHANGE UP (ref 1–3.3)
LYMPHOCYTES # BLD AUTO: 21.8 % — SIGNIFICANT CHANGE UP (ref 13–44)
MCHC RBC-ENTMCNC: 30 PG — SIGNIFICANT CHANGE UP (ref 27–34)
MCHC RBC-ENTMCNC: 32.8 G/DL — SIGNIFICANT CHANGE UP (ref 32–36)
MCV RBC AUTO: 91.3 FL — SIGNIFICANT CHANGE UP (ref 80–100)
MONOCYTES # BLD AUTO: 0.6 K/UL — SIGNIFICANT CHANGE UP (ref 0–0.9)
MONOCYTES NFR BLD AUTO: 10 % — SIGNIFICANT CHANGE UP (ref 2–14)
NEUTROPHILS # BLD AUTO: 4 K/UL — SIGNIFICANT CHANGE UP (ref 1.8–7.4)
NEUTROPHILS NFR BLD AUTO: 63.2 % — SIGNIFICANT CHANGE UP (ref 43–77)
PLATELET # BLD AUTO: 220 K/UL — SIGNIFICANT CHANGE UP (ref 150–400)
RBC # BLD: 5.28 M/UL — SIGNIFICANT CHANGE UP (ref 4.2–5.8)
RBC # FLD: 12.6 % — SIGNIFICANT CHANGE UP (ref 10.3–14.5)
WBC # BLD: 6.3 K/UL — SIGNIFICANT CHANGE UP (ref 3.8–10.5)
WBC # FLD AUTO: 6.3 K/UL — SIGNIFICANT CHANGE UP (ref 3.8–10.5)

## 2022-10-07 PROCEDURE — 99214 OFFICE O/P EST MOD 30 MIN: CPT

## 2022-10-07 NOTE — HISTORY OF PRESENT ILLNESS
[Disease: _____________________] : Disease: [unfilled] [AJCC Stage: ____] : AJCC Stage: [unfilled] [de-identified] : 47 year old male presenting to office for oncologic care. He is formerly under the care of Dr. Esequiel Durbin (medical oncology) and Dr. Daniel Desouza. Patient was seen with life threatening presentation in 10/2016, of AIDS and complicating infections. His KS has come to the fore in the weeks after his diagnosis and treatment affecting RLE, LLE, lungs at a minimum. \par \par 2016: 10-15 lb weight loss, night sweats, dysphagia. Notes he recalls little or nothing of the last two weeks prior to hospitalization, being taken by water ambulance from [place] to hospital \par \par 10/5/2016-10/16/2016: Admit [facility] with respiratory failure, initial diagnosis of AIDS. CF4 = 4, CF4/8 = 0/12, HIV RNA VL = 81074. Recalls tested negative in , and states was negative by Oraquick test in 2016. Admitted with WBC 1.2, Hb 11.6, , Na 123, Creat 1.33, ABG 7.52/28/56, with CXR with opacity in RLL. Rx abx, Rx Bipap and care in MICU. LEFT and RIGHT  leg swelling noted, U/S negative for clot. CT angio showed no PE. Multifocal pneumonia noted on CT. Found with enterovirus, rhinovirus, RSV positive, and HIV positive. Cx showed S. pneumonia. AFB negative in sputa to date. Hep B also found positive, viral load 98 IU/mL.  U/S showed gallstones but no cholecystitis. Also positive for oropharyngeal thrush.  Found also with severe protein chen malnutrition, suggested supplements.  \par \par 10/22/2016: Readmit with increasing RIGHT lower extremity pain and swelling. Multifocal lesions noted worsening over shin > posteriorly. RIGHT foot with substantial edema causing continuous unbearable pain.  \par \par 10/31/2016: Eventually Bx skin lesions showing KS. Rx x 1 PLD/Doxil before DC to rehab, which per patient and mom's report had been an utter disaster in terms of transition of care with medications and diagnoses incorrect at multiple junctures during stay. \par \par 16 : Reports R lower extremity swelling is much improved.  R lower extremity feels better, no open lesions. No other lesions noted. He is improving in terms of activity and now at home after being DC'd from rehab.\par \par 16 : Cycle 3 D 1 of PLD Dox. Reports felling well and his R leg is much improved after his 2nd cycle. Today his WBC is 2.7 and ANC of 300. He reports that he had an episode of fever on 16, 101F but did not report. He has not had any fevers since then and does not have chills, malaise or other S/S of infection. \par PLD/DOX held for 2 weeks for recovery.\par \par : Was seen by Dr. Alonzo at ID clinic for AIDS and Hep B. DDI noted with PLD/DOX and Tenofovir (CY inhibition) in Lexicomp. Tenofovir being changed to Descovy and Tivicay in order to avoid drug-interactions. This may have prolonged neutropenia and PLD/Dox to stay longer. His PLD/DOX is dose reduced to 35 mg/m2 today as well. \par \par 17: Cycle 4 today.  Cycle 3 dose reduced to 35 mg/m2 secondary to NTP and HIV medication was changed. Abscess under R axilla went to urgent center. Was was on clindamycin for 7 days(11). Now completely resolved.   He would like to go to a "salt steam house" for relaxation and breathing. \par \par 3/16/17: Here for cycle 5 of PLDox. He had another abscess under his right arm, completed clindamycin x 7 days. Continues to be followed closely with Dr. Alonzo(ID) for HIV. \par Weight gain on therapy, otherwise feels well. \par \par 17: Nolan comes in for cycle 6 of therapy. He had difficult time with his last cycle fatigue which lasted for about 4 days. He is fully recovered and feels well. He has gained a considerable amount of weight since starting therapy. Now moved back to [place] and walked 8 miles yesterday. No SOB, JULIEN, pain. \par \par 17 : Nolan is here for a follow up. He had CT chest on 17 showing significant improvement.  He reports R leg swelling worsening over a week and there is a small cut in the anterior portion 3 mm, oozing serous fluid and contralateral leg edema for 2 weeks with some read patches along the anterior portion of the shin, tender to palpate. No identifiable injury on the skin.  Saw Dr. Silva for follow up this morning. Currently on Descovy and Tivicay for HIV.\par \par 17 : Nolan comes  in today for a follow up. Completed 6 cycles of PLD/Doxil in  and is back in [facility]. Has had erythematous bilateral LE with edema on his last visit, we decided to monitor rather than biopsy and additional tests.   \par \par 10/6/17: Here for a follow up. He had acute hep A with elevated LFTs in September. He had fatigue at that time and refused to go to the ED as he was on [place]. His blood work from ID showed elevated LFTs.  CT chest today - results not on  as of 12 noon. He also notes pain in the sole of the foot and increased edema causing pain.\par \par 10/31/17: Nolan's LEFT leg and foot erythema and edema has improved only slightly since our last visit with ABX and now here for further discussion regarding management of KS complicating AIDS, with substantial symptoms from the damage the tumor has done to the soft tissue of the right > left lower extremity. CT chest showed stable changes with small pulmonary nodules and hypertension. Taking tramadol for pain. Pain in rated 3 on the tramadol. \par \par 2018:  Recent erythema right LE again. This dissipated within a few days. There was no warmth or erythema to suggest a cellulitis.  He notes increased xerosis and bumpiness to the skin and is out of triamcinolone and urea creams. He noted "PMS like symptoms" a day or two after chemotherapy, alleviated with acupuncture but likely related to pre treatment dexamethasone. \par \par 18 : Here for a follow up and for cycle 10 of PLD/Dox. He is due to go back to [place] to the summer after this Rx. He had substantial agitation at his ID visit, and was referred to a psychiatrist, who Rx clonazepam 0.5 bid with good effect. He had heartburn and agitation after last cycle of Rx, ? related to dexamethasone. He has continued leg pain and has heard of cannabis oil for the leg for pain. 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 His leg looks better than it has since diagnosis, however. He still has occasional pain and has skin issues such as cellulitis from time to time.  \par \par 12/3/2018: RTC feeling well, had tick bite this summer but no cellulitis. Still with anxiety disorder but finds present Rx with clonazepam and zolpidem helping overall. Occ respiratory issues with cold weather coming on, and also has some muscle fatigue / cramping in legs after working at the end of a day. \par \par 06/10/2019: Has baseline dyspnea with exertion and peripheral edema, but doing relatively well. He has done well with lymphedema therapy with compression stockings and other intervention since his surgical visit 2019. Skin is still friable and prone to ooze or bleed with injury but there is no violaceous recurrence to suggest KS recurrence. \par \par 19 : Here for a follow up. Doing better overall with lymphedema therapy, but still with residual scarring of legs / chronic edematous changes in RLE > LLE. A couple of mosquito bites have appeared to turn into early cellulitis over the summer but he has managed with topical Abx. \par \par 2020 transfer care to Dr. Griffin \par \par 2020 transfer care to Dr. Desouza. Plan to observe off systemic therapy and unless evidence of KS recurrence then no need for systemic chemo\par \par  [de-identified] : Patient presents for an initial consultation with mother\par Patient reports + swelling and pain on rt LE \par + itching  uses baby oil\par Cellulitis on left leg \par \par Sees Dr. Mccoy q 6months \par Takes Vitamin D daily, tramadol prn, doxy prn\par Uses baby oil for dryness of left lower extremity,\par Chronic lymphoedema see's specialists PMNR Dr. Ary Adams , received compression device has not had f/u and doesn't use consistently. \par \par Labs today\par WBC 6.3, hb 15.8 Plt 220 \par \par 3/25/22: \par Right middle lobe linear atelectasis and/or scarring. Multiple scattered bilateral calcified granulomas and stable 4 mm left upper lobe noncalcified nodule.\par

## 2022-10-07 NOTE — ASSESSMENT
[FreeTextEntry1] : 47 year old male presenting to office for oncologic care. He is formerly under the care of Dr. Esequiel Durbin (medical oncology) and Dr. Daniel Desouza. Patient was seen with life threatening presentation in 10/2016, of AIDS and complicating infections. His KS has come to the fore in the weeks after his diagnosis and treatment affecting RLE, LLE, lungs at a minimum. \par \par -S/p 10 cycles of liposomal doxorubicin (DOXO) completed April 2018. Pt has been monitored off of tx since then. \par -Pt had CT Chest ordered by Dr. Mccoy done on 3/22/22  will obtain results and give pt call with results\par -Will continue to monitor and surveil patient \par - CT CHEST: 3/25/22: Right middle lobe linear atelectasis and/or scarring. Multiple scattered bilateral calcified granulomas and stable 4 mm left upper lobe noncalcified nodule.\par  - Annual CT next due on 3/2023\par - will refer to dr Freedman\par -fu in 6 months \par - labs per ID\par

## 2022-10-07 NOTE — PHYSICAL EXAM
[Normal] : affect appropriate [de-identified] : Right lower extremity swelling and knee below some induration thickening of skin more medial compared to lateral no pedal pitting edemal, itchiness. left lower extremity, chronic redness, no swelling no edema [de-identified] : No active skin lesions

## 2022-10-11 LAB
ALBUMIN SERPL ELPH-MCNC: 3.7 G/DL
ALP BLD-CCNC: 68 U/L
ALT SERPL-CCNC: 52 U/L
ANION GAP SERPL CALC-SCNC: 10 MMOL/L
AST SERPL-CCNC: 45 U/L
BILIRUB SERPL-MCNC: 1.2 MG/DL
BUN SERPL-MCNC: 13 MG/DL
CALCIUM SERPL-MCNC: 9.1 MG/DL
CHLORIDE SERPL-SCNC: 105 MMOL/L
CO2 SERPL-SCNC: 24 MMOL/L
CREAT SERPL-MCNC: 0.96 MG/DL
EGFR: 97 ML/MIN/1.73M2
GLUCOSE SERPL-MCNC: 102 MG/DL
LDH SERPL-CCNC: 206 U/L
POTASSIUM SERPL-SCNC: 3.9 MMOL/L
PROT SERPL-MCNC: 7.8 G/DL
SODIUM SERPL-SCNC: 139 MMOL/L

## 2022-10-12 ENCOUNTER — RX RENEWAL (OUTPATIENT)
Age: 47
End: 2022-10-12

## 2022-11-10 ENCOUNTER — APPOINTMENT (OUTPATIENT)
Dept: PHYSICAL MEDICINE AND REHAB | Facility: CLINIC | Age: 47
End: 2022-11-10

## 2022-11-11 ENCOUNTER — RX RENEWAL (OUTPATIENT)
Age: 47
End: 2022-11-11

## 2022-11-14 ENCOUNTER — NON-APPOINTMENT (OUTPATIENT)
Age: 47
End: 2022-11-14

## 2022-12-12 ENCOUNTER — RX RENEWAL (OUTPATIENT)
Age: 47
End: 2022-12-12

## 2022-12-14 ENCOUNTER — RX RENEWAL (OUTPATIENT)
Age: 47
End: 2022-12-14

## 2022-12-15 ENCOUNTER — RX RENEWAL (OUTPATIENT)
Age: 47
End: 2022-12-15

## 2022-12-20 ENCOUNTER — APPOINTMENT (OUTPATIENT)
Dept: RADIATION ONCOLOGY | Facility: CLINIC | Age: 47
End: 2022-12-20
Payer: MEDICARE

## 2022-12-20 VITALS
WEIGHT: 224 LBS | OXYGEN SATURATION: 100 % | BODY MASS INDEX: 35.08 KG/M2 | SYSTOLIC BLOOD PRESSURE: 117 MMHG | RESPIRATION RATE: 16 BRPM | HEART RATE: 80 BPM | DIASTOLIC BLOOD PRESSURE: 73 MMHG

## 2022-12-20 DIAGNOSIS — I89.0 LYMPHEDEMA, NOT ELSEWHERE CLASSIFIED: ICD-10-CM

## 2022-12-20 PROCEDURE — 99213 OFFICE O/P EST LOW 20 MIN: CPT

## 2022-12-21 PROBLEM — I89.0 ELEPHANTIASIS NOSTRA VERRUCOSA: Status: ACTIVE | Noted: 2017-11-06

## 2022-12-21 NOTE — VITALS
[Maximal Pain Intensity: 3/10] : 3/10 [Least Pain Intensity: 0/10] : 0/10 [Pain Description/Quality: ___] : Pain description/quality: [unfilled] [Pain Duration: ___] : Pain duration: [unfilled] [Pain Location: ___] : Pain Location: [unfilled] [Pain Interferes with ADLs] : Pain interferes with activities of daily living. [Opioid] : opioid [ECOG Performance Status: 1 - Restricted in physically strenuous activity but ambulatory and able to carry out work of a light or sedentary nature] : Performance Status: 1 - Restricted in physically strenuous activity but ambulatory and able to carry out work of a light or sedentary nature, e.g., light house work, office work [Date: ____________] : Patient's last distress assessment performed on [unfilled]. [8 - Distress Level] : Distress Level: 8 [Referred Patient  to social work for follow-up] : Patient was referred to social work for follow-up [80: Normal activity with effort; some signs or symptoms of disease.] : 80: Normal activity with effort; some signs or symptoms of disease.

## 2022-12-21 NOTE — REVIEW OF SYSTEMS
[Negative] : Allergic/Immunologic [FreeTextEntry6] : on respiratory maintenance and rescue medications  [de-identified] : Lower extremity lymphedema R>L [FreeTextEntry1] : AIDS

## 2022-12-21 NOTE — DATA REVIEWED
[FreeTextEntry1] : pathology.\par labs Oct 2022\par imaging studies: CT chest March 22, 2022\par medical record\par

## 2022-12-21 NOTE — HISTORY OF PRESENT ILLNESS
[FreeTextEntry1] : This 47 year-old male presents for radiation medicine follow-up accompanied by his mother Nahomy.  Mr. Catherine has been under treatment for AIDS by Dr. Perez after life-threatening presentation to the hospital in September 2016.   Patient recalls testing negative in 2013, and states he was also negative by Oraquick test in 04/2016.  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  He was treated by Dr. Durbin with chemotherapy for the disease between 11/2016 and 4/5/2018. \par Never had radiotherapy.  \par \par After Dr. Durbin left the practice, the patient was evaluated for this diagnosis by Dr. Griffin, and then by Dr. Daniel Desouza November 6, 2020.  The patient has been under surveillance.\par \par 10/5/2016 -- 10/16/2016:  \par Admited to [facility] with respiratory failure, initial diagnosis of AIDS. CF4 = 4, CF4/8 = 0/12, HIV RNA VL = 85418.   Admitted with WBC 1.2, Hb 11.6, , Na 123, Creat 1.33, ABG 7.52/28/56, CXR with opacity in RLL.  He was treated with antibiotics, BiPap,  and care in MICU.   LEFT and RIGHT leg swelling was noted, U/S negative for clot. CT angio showed no PE. Multifocal pneumonia noted on CT.  Positive findings for enterovirus, rhinovirus, RSV, and HIV, oropharyngeal thrush.  Culture showed S. pneumonia.  AFB negative in sputum to date.  Hepatitis B also found positive.   Viral load 98 IU/mL.  U/S showed gallstones but no cholecystitis.  Also positive for oropharyngeal thrush.  He was also found to have severe protein calorie malnutrition. \par \par 10/25/2016: \par Biopsy of skin lesions showing Kaposki's sarcoma.\par \par He presents today with chronic lymphedema of the right lower extremity.  He reports taking tramadol the chronic pain. He reports he was followed by PMR Dr. Ary Adams.  At her direction, he was using compression garment which he finds very difficult to wear.  He reports more recently has been using compression devices. \par \par Reports improvement in breathing since starting Breo Elipta.  Notes he takes rescue inhaler 1X daily.  Breathe sounds CTA B/L  B/L lower extremities with edema that waxes and wanes R>L.  Mr. Catherine presents today with much improved lymphedema, no dimpling or blistering.  Patient reports using mechanical compression devices of late.  \par \par Now follows with Dr. Larson.\par \par \par

## 2022-12-21 NOTE — PHYSICAL EXAM
[Obese] : obese [Normal] : no focal deficits [de-identified] :  small superficial unpigmented subcentimeter sessile nodularity of distal right  lower extremity, few with scabbing/excoriation [de-identified] : Right lower extremity chronic lymphedema [de-identified] : anxious

## 2022-12-21 NOTE — LETTER CLOSING
[Sincerely yours,] : Sincerely yours, [FreeTextEntry3] : Kel Davalos MD\par Physician in Chief\par Department of Radiation Medicine\par [facility] [place]\par [facility] [place]\par \par  of Radiation Medicine\par [facility]\par at  [facility]\par \par Radiation \par [facility]/\par [facility] [place]\par [address]\par [place], [place] [address]\par \par Tel: [phone]\par Fax: [phone]\par

## 2022-12-21 NOTE — LETTER GREETING
[Dear Doctor] : Dear Doctor, [Please see my note below.] : Please see my note below. [Follow-Up] : Your patient, [unfilled] was seen in my office today for follow-up [FreeTextEntry2] : Tobin Perez MD\robert Larson MD

## 2022-12-21 NOTE — ASSESSMENT
[No evidence of disease] : No evidence of disease [FreeTextEntry1] : sequelae of Kaposi sarcoma in right lower extremity

## 2023-01-02 ENCOUNTER — TRANSCRIPTION ENCOUNTER (OUTPATIENT)
Age: 48
End: 2023-01-02

## 2023-01-03 ENCOUNTER — TRANSCRIPTION ENCOUNTER (OUTPATIENT)
Age: 48
End: 2023-01-03

## 2023-01-06 ENCOUNTER — RX RENEWAL (OUTPATIENT)
Age: 48
End: 2023-01-06

## 2023-01-12 NOTE — ED CDU PROVIDER INITIAL DAY NOTE - NS_OBSORDERDATE_ED_A_ED
From: Ubaldo Galicia  To: Alexis Mcginnis MD  Sent: 7/2/2019 1:51 PM CDT  Subject: Lab Test or Test Related Question    I received my metabolic panel and my lipid panel results from my blood draw and physical on Friday, June 28th. However, I did not receive the PSA results. Is there a reason for that?   Problem: Self Harm/Suicidality  Goal: Will have no self-injury during hospital stay  Description: INTERVENTIONS:  1. Ensure constant observer at bedside with Q15M safety checks  2. Maintain a safe environment  3. Secure patient belongings  4. Ensure family/visitors adhere to safety recommendations  5. Ensure safety tray has been added to patient's diet order  6. Every shift and PRN: Re-assess suicidal risk via Frequent Screener    1/12/2023 1717 by Neto Arreguin RN  Outcome: Progressing   1:1 with pt x ten minutes. Pt encouraged to attend unit programming and interact with peers and staff. Pt also encouraged to tend to hygiene and ADLs. Pt encouraged to discuss feelings with staff and feedback and reassurance provided. Pt denies thoughts of self harm and is agreeable to seeking out should thoughts of self harm arise. Safe environment maintained. Q15 minute checks for safety cont per unit policy. Will cont to monitor for safety and provides support and reassurance as needed. Pt is labile and needs frequent redirection. Compliant with medications. Will be discharged to [facility] tomorrow. 28-Jul-2020 07:54

## 2023-01-30 NOTE — H&P PST ADULT - LIVES WITH, PROFILE
Ileana Barragan is a 66 y.o. male    Chief Complaint   Patient presents with    Diabetes    Hypertension     1. Have you been to the ER, urgent care clinic since your last visit? Hospitalized since your last visit? No    2. Have you seen or consulted any other health care providers outside of the [address] [place] [address] since your last visit? Include any pap smears or colon screening.  No parents

## 2023-02-06 ENCOUNTER — RX RENEWAL (OUTPATIENT)
Age: 48
End: 2023-02-06

## 2023-02-07 ENCOUNTER — RX RENEWAL (OUTPATIENT)
Age: 48
End: 2023-02-07

## 2023-02-08 ENCOUNTER — RX RENEWAL (OUTPATIENT)
Age: 48
End: 2023-02-08

## 2023-02-08 ENCOUNTER — NON-APPOINTMENT (OUTPATIENT)
Age: 48
End: 2023-02-08

## 2023-02-10 ENCOUNTER — RX RENEWAL (OUTPATIENT)
Age: 48
End: 2023-02-10

## 2023-02-12 ENCOUNTER — FORM ENCOUNTER (OUTPATIENT)
Age: 48
End: 2023-02-12

## 2023-02-13 ENCOUNTER — APPOINTMENT (OUTPATIENT)
Dept: INFECTIOUS DISEASE | Facility: CLINIC | Age: 48
End: 2023-02-13
Payer: MEDICARE

## 2023-02-13 ENCOUNTER — RESULT REVIEW (OUTPATIENT)
Age: 48
End: 2023-02-13

## 2023-02-13 VITALS
SYSTOLIC BLOOD PRESSURE: 127 MMHG | WEIGHT: 221 LBS | HEART RATE: 86 BPM | TEMPERATURE: 98 F | OXYGEN SATURATION: 98 % | BODY MASS INDEX: 34.69 KG/M2 | HEIGHT: 67 IN | DIASTOLIC BLOOD PRESSURE: 77 MMHG

## 2023-02-13 DIAGNOSIS — K21.9 GASTRO-ESOPHAGEAL REFLUX DISEASE W/OUT ESOPHAGITIS: ICD-10-CM

## 2023-02-13 PROCEDURE — 99214 OFFICE O/P EST MOD 30 MIN: CPT | Mod: 25

## 2023-02-13 PROCEDURE — 90619 MENACWY-TT VACCINE IM: CPT | Mod: GY

## 2023-02-13 PROCEDURE — 90471 IMMUNIZATION ADMIN: CPT

## 2023-02-13 NOTE — ASSESSMENT
[FreeTextEntry1] : HIV on Biktarvy doing well, continue current meds\par He is concerned with weight gain\par Will work on healthy eating exercise and refer to dietician, if unable to loose weight consider switch to Odefsey. Spoke with dietician, he felt they were managing OK, so continued home cooking. Gave up baking.\par Labs from Feb 2022 reviewed \par GERD sx, no bulge, hernia on exam, check h pylori Ab, if + for stool test/refer to EGD, if negative give trial of PPI. Refer to GI for dysphagia sx.\par H/O recurrent Staph skin infections, continue hibiclens and mupirocin\par Continue moisturizing legs/Epsom salts soaks for feet, using compression device\par Vaccines: UTD got COVID bivalent booster and Flu shot, due for meningococcal booster\par Check annual labs\par CD4, VL, CBC, SMA, U/A\par Check Lipids (non-fasted), HgA1c, Vitamin D, QTF, HCV, U/A\par Chronic hepatitis B, check AFP and hepatic US had mild transaminitis Oct 2022\par KS- follows with Daniel Wilkerson, transferred to Dr Ziegler (Heme-onc) and Dr Davalos (Rad-onc)- had CT in March 2022, for repeat in March 2023\par Pedal edema was following with PMR, dermatology referral- is reluctant to use wraps\par Anal Pap: had HRA- saw Dr Harris Jan 2021, refer for F/U\par Has very low LDL and HDL- not on medication, liver function appears normal (INR, Platlets- normal)\par

## 2023-02-13 NOTE — REVIEW OF SYSTEMS
[Shortness Of Breath] : shortness of breath [Cough] : cough [Sputum] : coughing up ~M sputum [Abdominal Pain] : abdominal pain [Negative] : Heme/Lymph

## 2023-02-13 NOTE — HISTORY OF PRESENT ILLNESS
[FreeTextEntry1] : HIV on Biktarvy\par Takes daily in the mornings\par Tolerating well\par No missed doses this week\par \par \par Has a cold and congestion for a couple of days\par No fever\par Expectorating phlegm, was deep green now yellow\par Was some SOB walking\par Tested at home for COVID today and 3 X this week all negative\par \par Has pain in midesophagus when he eats, has to sit upright\par Solid food\par Sometimes happens with liquids\par On and off since last year.\par Has had GERD symptoms\par Occasionally gets a bulge over upper abdomen [Sexually Active] : The patient is not sexually active

## 2023-02-14 ENCOUNTER — TRANSCRIPTION ENCOUNTER (OUTPATIENT)
Age: 48
End: 2023-02-14

## 2023-02-15 ENCOUNTER — NON-APPOINTMENT (OUTPATIENT)
Age: 48
End: 2023-02-15

## 2023-02-15 DIAGNOSIS — R05.9 COUGH, UNSPECIFIED: ICD-10-CM

## 2023-02-15 LAB
AMYLASE/CREAT SERPL: 52 U/L
LPL SERPL-CCNC: 20 U/L

## 2023-02-21 ENCOUNTER — NON-APPOINTMENT (OUTPATIENT)
Age: 48
End: 2023-02-21

## 2023-02-22 ENCOUNTER — NON-APPOINTMENT (OUTPATIENT)
Age: 48
End: 2023-02-22

## 2023-02-23 ENCOUNTER — TRANSCRIPTION ENCOUNTER (OUTPATIENT)
Age: 48
End: 2023-02-23

## 2023-02-23 DIAGNOSIS — E66.9 OBESITY, UNSPECIFIED: ICD-10-CM

## 2023-02-27 ENCOUNTER — NON-APPOINTMENT (OUTPATIENT)
Age: 48
End: 2023-02-27

## 2023-03-01 ENCOUNTER — NON-APPOINTMENT (OUTPATIENT)
Age: 48
End: 2023-03-01

## 2023-03-06 ENCOUNTER — NON-APPOINTMENT (OUTPATIENT)
Age: 48
End: 2023-03-06

## 2023-03-06 ENCOUNTER — RX RENEWAL (OUTPATIENT)
Age: 48
End: 2023-03-06

## 2023-03-08 NOTE — ED PROVIDER NOTE - DOMESTIC TRAVEL HIGH RISK QUESTION
Care Transitions (ED/OBS)Telephone Call Attempt    Called patient for week 5 check-in with Care Transitions Program. 1st attempt. Left message. Will try again at a later time.     Obinna Tam RN, BSN  Care Transition Nurse  [place] #[phone]  [place]  #[phone]    No

## 2023-03-09 ENCOUNTER — NON-APPOINTMENT (OUTPATIENT)
Age: 48
End: 2023-03-09

## 2023-03-13 ENCOUNTER — RX RENEWAL (OUTPATIENT)
Age: 48
End: 2023-03-13

## 2023-03-13 ENCOUNTER — OUTPATIENT (OUTPATIENT)
Dept: OUTPATIENT SERVICES | Facility: HOSPITAL | Age: 48
LOS: 1 days | End: 2023-03-13
Payer: MEDICARE

## 2023-03-13 ENCOUNTER — APPOINTMENT (OUTPATIENT)
Dept: ULTRASOUND IMAGING | Facility: CLINIC | Age: 48
End: 2023-03-13
Payer: MEDICARE

## 2023-03-13 DIAGNOSIS — Z86.14 PERSONAL HISTORY OF METHICILLIN RESISTANT STAPHYLOCOCCUS AUREUS INFECTION: Chronic | ICD-10-CM

## 2023-03-13 DIAGNOSIS — K80.20 CALCULUS OF GALLBLADDER W/OUT CHOLECYSTITIS W/OUT OBSTRUCTION: ICD-10-CM

## 2023-03-13 DIAGNOSIS — Z90.89 ACQUIRED ABSENCE OF OTHER ORGANS: Chronic | ICD-10-CM

## 2023-03-13 DIAGNOSIS — K76.0 FATTY (CHANGE OF) LIVER, NOT ELSEWHERE CLASSIFIED: ICD-10-CM

## 2023-03-13 DIAGNOSIS — B19.10 UNSPECIFIED VIRAL HEPATITIS B WITHOUT HEPATIC COMA: ICD-10-CM

## 2023-03-13 DIAGNOSIS — Z98.890 OTHER SPECIFIED POSTPROCEDURAL STATES: Chronic | ICD-10-CM

## 2023-03-13 DIAGNOSIS — Z86.19 PERSONAL HISTORY OF OTHER INFECTIOUS AND PARASITIC DISEASES: Chronic | ICD-10-CM

## 2023-03-13 PROCEDURE — 76705 ECHO EXAM OF ABDOMEN: CPT | Mod: 26

## 2023-03-13 PROCEDURE — 76705 ECHO EXAM OF ABDOMEN: CPT

## 2023-03-14 ENCOUNTER — NON-APPOINTMENT (OUTPATIENT)
Age: 48
End: 2023-03-14

## 2023-03-14 LAB
HCV RNA SERPL NAA DL=5-ACNC: NORMAL IU/ML
HCV RNA SERPL NAA+PROBE-LOG IU: 4.78 LOGIU/ML
HEPC RNA INTERP: DETECTED

## 2023-03-15 ENCOUNTER — NON-APPOINTMENT (OUTPATIENT)
Age: 48
End: 2023-03-15

## 2023-03-16 ENCOUNTER — NON-APPOINTMENT (OUTPATIENT)
Age: 48
End: 2023-03-16

## 2023-03-16 LAB
FIBROSIS SCORE: 0.43
FIBROSIS STAGE: NORMAL
FIBROSURE ALPHA 2 MACROGLOBULINS: 152 MG/DL
FIBROSURE ALT (SGPT): 138 IU/L
FIBROSURE APOLIPOPROTEIN A1: 118 MG/DL
FIBROSURE COMMENT 2: NORMAL
FIBROSURE GGT: 64 IU/L
FIBROSURE HAPTOGLOBIN: 59 MG/DL
FIBROSURE INTERPRETATIONS: NORMAL
FIBROSURE LIMITATIONS: NORMAL
FIBROSURE NECROINFLAMM ACTIVITY SCORING: NORMAL
FIBROSURE NECROINFLAMMAT ACTIVITY GRPFIBROSURE NECROINFLAMMA: NORMAL
FIBROSURE NECROINFLAMMAT ACTIVITY SCORE: 0.73
FIBROSURE SCORING: NORMAL
FIBROSURE TOTAL BILIRUBIN: 0.8 MG/DL

## 2023-03-17 LAB — HCV GENTYP BLD NAA+PROBE: NORMAL

## 2023-03-28 ENCOUNTER — APPOINTMENT (OUTPATIENT)
Dept: INFECTIOUS DISEASE | Facility: CLINIC | Age: 48
End: 2023-03-28
Payer: MEDICARE

## 2023-03-28 ENCOUNTER — NON-APPOINTMENT (OUTPATIENT)
Age: 48
End: 2023-03-28

## 2023-03-28 VITALS
BODY MASS INDEX: 34.37 KG/M2 | OXYGEN SATURATION: 98 % | DIASTOLIC BLOOD PRESSURE: 69 MMHG | HEIGHT: 67 IN | SYSTOLIC BLOOD PRESSURE: 99 MMHG | HEART RATE: 96 BPM | WEIGHT: 219 LBS | TEMPERATURE: 97.6 F

## 2023-03-28 DIAGNOSIS — J18.9 PNEUMONIA, UNSPECIFIED ORGANISM: ICD-10-CM

## 2023-03-28 PROCEDURE — 99214 OFFICE O/P EST MOD 30 MIN: CPT

## 2023-03-28 NOTE — ASSESSMENT
[FreeTextEntry1] : HIV on Biktarvy doing well, continue current meds\par He is concerned with weight gain- has referral for bariatrics\par Will work on healthy eating exercise and refer to dietician, if unable to loose weight consider switch to Odefsey. Spoke with dietician, he felt they were managing OK, so continued home cooking. Gave up baking.\par Labs from Feb 2022 reviewed \par GERD sx, no bulge, hernia on exam, check h pylori Ab, if + for stool test/refer to EGD, if negative give trial of PPI. Saw GI for dysphagia sx.- plannig EGD when respiratory symptoms resolve, had negative cologuard\par H/O recurrent Staph skin infections, continue hibiclens and mupirocin\par Continue moisturizing legs/Epsom salts soaks for feet, using compression device\par Vaccines: UTD got COVID bivalent booster and Flu shot, due for meningococcal booster\par Chronic hepatitis B, check AFP and hepatic US had mild transaminitis Oct 2022- was new HCV+, VL 59K, GT 1b, Fibrosure F1/F2, plan to start 8 weeks of Myvret today. \par At 2-4 weeks will check LFTs, HCV, Hep B VL, and labs at EOT and 12 weeks post treatment\par KS- follows with Daniel Wilkerson, transferred to Dr Ziegler (Heme-onc) and Dr Davalos (Rad-onc)- had CT in March 2022, for repeat in March 2023\par Pedal edema was following with PMR, dermatology referral- is reluctant to use wraps\par Anal Pap: had HRA- saw Dr Harris Jan 2021, refer for F/U- did not like the experience- will refer to Dr Yoshi Whyte for HRA\par Has very low LDL and HDL- not on medication, liver function appears normal (INR, Platlets- normal)\par Cough persists post adenovirus and treatment for pneumonia mid March, no wheezing now, will not use prednisone, treat pleuritic pain with motrin, use expectorant and cough suppressant, does not want codeine\par

## 2023-03-28 NOTE — HISTORY OF PRESENT ILLNESS
[FreeTextEntry1] : HIV on Biktarvy\par Takes daily in the mornings\par Tolerating well\par No missed doses this week\par \par Here for initiation of treatment for acute HCV infection\par \par Was seen at [facility] and diagnosed with left sided pneumonia clinically (x-ray machine broken) March 15. Treated with antibiotics- Augmentin and Doxycycline for 7 days, and tessalon perles\par Has persistent cough\par RVP in February was + adenovirus [Sexually Active] : The patient is not sexually active

## 2023-03-31 ENCOUNTER — APPOINTMENT (OUTPATIENT)
Dept: GASTROENTEROLOGY | Facility: CLINIC | Age: 48
End: 2023-03-31

## 2023-04-06 ENCOUNTER — RX RENEWAL (OUTPATIENT)
Age: 48
End: 2023-04-06

## 2023-04-10 ENCOUNTER — RX RENEWAL (OUTPATIENT)
Age: 48
End: 2023-04-10

## 2023-04-11 ENCOUNTER — OUTPATIENT (OUTPATIENT)
Dept: OUTPATIENT SERVICES | Facility: HOSPITAL | Age: 48
LOS: 1 days | Discharge: HOME | End: 2023-04-11

## 2023-04-11 DIAGNOSIS — Z98.890 OTHER SPECIFIED POSTPROCEDURAL STATES: Chronic | ICD-10-CM

## 2023-04-11 DIAGNOSIS — Z86.14 PERSONAL HISTORY OF METHICILLIN RESISTANT STAPHYLOCOCCUS AUREUS INFECTION: Chronic | ICD-10-CM

## 2023-04-11 DIAGNOSIS — C46.0 KAPOSI'S SARCOMA OF SKIN: ICD-10-CM

## 2023-04-11 DIAGNOSIS — Z86.19 PERSONAL HISTORY OF OTHER INFECTIOUS AND PARASITIC DISEASES: Chronic | ICD-10-CM

## 2023-04-11 DIAGNOSIS — Z90.89 ACQUIRED ABSENCE OF OTHER ORGANS: Chronic | ICD-10-CM

## 2023-04-13 ENCOUNTER — NON-APPOINTMENT (OUTPATIENT)
Age: 48
End: 2023-04-13

## 2023-04-17 LAB
ALBUMIN SERPL ELPH-MCNC: 3.8 G/DL
ALP BLD-CCNC: 70 U/L
ALT SERPL-CCNC: 18 U/L
AST SERPL-CCNC: 19 U/L
BILIRUB DIRECT SERPL-MCNC: 0.4 MG/DL
BILIRUB INDIRECT SERPL-MCNC: 1.1 MG/DL
BILIRUB SERPL-MCNC: 1.5 MG/DL
HCV RNA SERPL NAA+PROBE-LOG IU: NOT DETECTED LOGIU/ML
HEPB DNA PCR INT: NOT DETECTED
HEPB DNA PCR LOG: NOT DETECTED LOGIU/ML
HEPC RNA INTERP: NOT DETECTED
PROT SERPL-MCNC: 7 G/DL

## 2023-04-19 ENCOUNTER — APPOINTMENT (OUTPATIENT)
Dept: HEMATOLOGY ONCOLOGY | Facility: CLINIC | Age: 48
End: 2023-04-19
Payer: MEDICARE

## 2023-04-19 PROCEDURE — 99447 NTRPROF PH1/NTRNET/EHR 11-20: CPT

## 2023-04-19 NOTE — PHYSICAL EXAM
[Normal] : affect appropriate [de-identified] : Right lower extremity swelling and knee below some induration thickening of skin more medial compared to lateral no pedal pitting edemal, itchiness. left lower extremity, chronic redness, no swelling no edema [de-identified] : No active skin lesions

## 2023-04-19 NOTE — HISTORY OF PRESENT ILLNESS
[Disease: _____________________] : Disease: [unfilled] [AJCC Stage: ____] : AJCC Stage: [unfilled] [Home] : at home, [unfilled] , at the time of the visit. [Medical Office: ([facility])___] : at the medical office located in  [Verbal consent obtained from patient] : the patient, [unfilled] [de-identified] : 47 year old male presenting to office for oncologic care. He is formerly under the care of Dr. Esequiel Durbin (medical oncology) and Dr. Daniel Desouza. Patient was seen with life threatening presentation in 10/2016, of AIDS and complicating infections. His KS has come to the fore in the weeks after his diagnosis and treatment affecting RLE, LLE, lungs at a minimum. \par \par 2016: 10-15 lb weight loss, night sweats, dysphagia. Notes he recalls little or nothing of the last two weeks prior to hospitalization, being taken by water ambulance from [place] to hospital \par \par 10/5/2016-10/16/2016: Admit [facility] with respiratory failure, initial diagnosis of AIDS. CF4 = 4, CF4/8 = 0/12, HIV RNA VL = 90755. Recalls tested negative in , and states was negative by Oraquick test in 2016. Admitted with WBC 1.2, Hb 11.6, , Na 123, Creat 1.33, ABG 7.52/28/56, with CXR with opacity in RLL. Rx abx, Rx Bipap and care in MICU. LEFT and RIGHT  leg swelling noted, U/S negative for clot. CT angio showed no PE. Multifocal pneumonia noted on CT. Found with enterovirus, rhinovirus, RSV positive, and HIV positive. Cx showed S. pneumonia. AFB negative in sputa to date. Hep B also found positive, viral load 98 IU/mL.  U/S showed gallstones but no cholecystitis. Also positive for oropharyngeal thrush.  Found also with severe protein chen malnutrition, suggested supplements.  \par \par 10/22/2016: Readmit with increasing RIGHT lower extremity pain and swelling. Multifocal lesions noted worsening over shin > posteriorly. RIGHT foot with substantial edema causing continuous unbearable pain.  \par \par 10/31/2016: Eventually Bx skin lesions showing KS. Rx x 1 PLD/Doxil before DC to rehab, which per patient and mom's report had been an utter disaster in terms of transition of care with medications and diagnoses incorrect at multiple junctures during stay. \par \par 16 : Reports R lower extremity swelling is much improved.  R lower extremity feels better, no open lesions. No other lesions noted. He is improving in terms of activity and now at home after being DC'd from rehab.\par \par 16 : Cycle 3 D 1 of PLD Dox. Reports felling well and his R leg is much improved after his 2nd cycle. Today his WBC is 2.7 and ANC of 300. He reports that he had an episode of fever on 16, 101F but did not report. He has not had any fevers since then and does not have chills, malaise or other S/S of infection. \par PLD/DOX held for 2 weeks for recovery.\par \par : Was seen by Dr. Alonzo at ID clinic for AIDS and Hep B. DDI noted with PLD/DOX and Tenofovir (CY inhibition) in Lexicomp. Tenofovir being changed to Descovy and Tivicay in order to avoid drug-interactions. This may have prolonged neutropenia and PLD/Dox to stay longer. His PLD/DOX is dose reduced to 35 mg/m2 today as well. \par \par 17: Cycle 4 today.  Cycle 3 dose reduced to 35 mg/m2 secondary to NTP and HIV medication was changed. Abscess under R axilla went to urgent center. Was was on clindamycin for 7 days(11). Now completely resolved.   He would like to go to a "salt steam house" for relaxation and breathing. \par \par 3/16/17: Here for cycle 5 of PLDox. He had another abscess under his right arm, completed clindamycin x 7 days. Continues to be followed closely with Dr. Alonzo(ID) for HIV. \par Weight gain on therapy, otherwise feels well. \par \par 17: Nolan comes in for cycle 6 of therapy. He had difficult time with his last cycle fatigue which lasted for about 4 days. He is fully recovered and feels well. He has gained a considerable amount of weight since starting therapy. Now moved back to [place] and walked 8 miles yesterday. No SOB, JULIEN, pain. \par \par 17 : Nolan is here for a follow up. He had CT chest on 17 showing significant improvement.  He reports R leg swelling worsening over a week and there is a small cut in the anterior portion 3 mm, oozing serous fluid and contralateral leg edema for 2 weeks with some read patches along the anterior portion of the shin, tender to palpate. No identifiable injury on the skin.  Saw Dr. Silva for follow up this morning. Currently on Descovy and Tivicay for HIV.\par \par 17 : Nolan comes  in today for a follow up. Completed 6 cycles of PLD/Doxil in  and is back in [facility]. Has had erythematous bilateral LE with edema on his last visit, we decided to monitor rather than biopsy and additional tests.   \par \par 10/6/17: Here for a follow up. He had acute hep A with elevated LFTs in September. He had fatigue at that time and refused to go to the ED as he was on [place]. His blood work from ID showed elevated LFTs.  CT chest today - results not on  as of 12 noon. He also notes pain in the sole of the foot and increased edema causing pain.\par \par 10/31/17: Nolan's LEFT leg and foot erythema and edema has improved only slightly since our last visit with ABX and now here for further discussion regarding management of KS complicating AIDS, with substantial symptoms from the damage the tumor has done to the soft tissue of the right > left lower extremity. CT chest showed stable changes with small pulmonary nodules and hypertension. Taking tramadol for pain. Pain in rated 3 on the tramadol. \par \par 2018:  Recent erythema right LE again. This dissipated within a few days. There was no warmth or erythema to suggest a cellulitis.  He notes increased xerosis and bumpiness to the skin and is out of triamcinolone and urea creams. He noted "PMS like symptoms" a day or two after chemotherapy, alleviated with acupuncture but likely related to pre treatment dexamethasone. \par \par 18 : Here for a follow up and for cycle 10 of PLD/Dox. He is due to go back to [place] to the summer after this Rx. He had substantial agitation at his ID visit, and was referred to a psychiatrist, who Rx clonazepam 0.5 bid with good effect. He had heartburn and agitation after last cycle of Rx, ? related to dexamethasone. He has continued leg pain and has heard of cannabis oil for the leg for pain. 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 His leg looks better than it has since diagnosis, however. He still has occasional pain and has skin issues such as cellulitis from time to time.  \par \par 12/3/2018: RTC feeling well, had tick bite this summer but no cellulitis. Still with anxiety disorder but finds present Rx with clonazepam and zolpidem helping overall. Occ respiratory issues with cold weather coming on, and also has some muscle fatigue / cramping in legs after working at the end of a day. \par \par 06/10/2019: Has baseline dyspnea with exertion and peripheral edema, but doing relatively well. He has done well with lymphedema therapy with compression stockings and other intervention since his surgical visit 2019. Skin is still friable and prone to ooze or bleed with injury but there is no violaceous recurrence to suggest KS recurrence. \par \par 19 : Here for a follow up. Doing better overall with lymphedema therapy, but still with residual scarring of legs / chronic edematous changes in RLE > LLE. A couple of mosquito bites have appeared to turn into early cellulitis over the summer but he has managed with topical Abx. \par \par 2020 transfer care to Dr. Griffin \par \par 2020 transfer care to Dr. Desouza. Plan to observe off systemic therapy and unless evidence of KS recurrence then no need for systemic chemo\par \par  [de-identified] : \par 10/7/22 Patient presents for an initial consultation with mother\par Patient reports + swelling and pain on rt LE \par + itching  uses baby oil\par Cellulitis on left leg \par Sees Dr. Mccoy q 6months \par Takes Vitamin D daily, tramadol prn, doxy prn\par Uses baby oil for dryness of left lower extremity,\par Chronic lymphoedema see's specialists PMNR Dr. Ary Adams , received compression device has not had f/u and doesn't use consistently. \par 3/25/22: Right middle lobe linear atelectasis and/or scarring. Multiple scattered bilateral calcified granulomas and stable 4 mm left upper lobe noncalcified nodule.\par \par \par 04/19/2023 Returns for follow up visit via telephone visit. Reports had pneumonia in the end of 3/2023, reports mild swelling and pain in /l legs, is using LAD treatment at home daily with improvement in feet swelling. Reports using product called Skin tonic topically with improved redness and swelling. Feels well overall. Denies acute pain.  \par 2/13/2023  CBC: WBC 5.3 K, HGB 15.5 g, HCT 47.2 %,  K

## 2023-04-19 NOTE — ADDENDUM
[FreeTextEntry1] : Documented by Drea Hayden acting as scribe for Dr. Larson on 04/19/2023.\par \par All Medical record entries made by the Scribe were at my, Dr. Larson, direction and personally dictated by me on 04/19/2023. I have reviewed the chart and agree that the record accurately reflects my personal performance of the history, physical exam, assessment and plan. I have also personally directed, reviewed, and agreed with the discharge instructions.

## 2023-04-19 NOTE — ASSESSMENT
[FreeTextEntry1] : 48 year old male presenting to office for oncologic care. He is formerly under the care of Dr. Esequiel Durbin (medical oncology) and Dr. Daniel Desouza. Patient was seen with life threatening presentation in 10/2016, of AIDS and complicating infections. His KS has come to the fore in the weeks after his diagnosis and treatment affecting RLE, LLE, lungs at a minimum. \par \par -S/p 10 cycles of liposomal doxorubicin (DOXO) completed April 2018. Pt has been monitored off of tx since then. \par -Pt had CT Chest ordered by Dr. Mccoy done on 3/22/22 \par -Will continue to monitor and surveil patient \par - Previously, referred to dr Freedman\par - CT CHEST: 3/25/22: Right middle lobe linear atelectasis and/or scarring. Multiple scattered bilateral calcified granulomas and stable 4 mm left upper lobe noncalcified nodule.\par - 2/13/2023  CBC: WBC 5.3 K, HGB 15.5 g, HCT 47.2 %,  K\par - Annual CT next due on 3/2023, advised to obtain \par \par - RTO in 6 months \par - labs per ID\par \par

## 2023-05-04 ENCOUNTER — RX RENEWAL (OUTPATIENT)
Age: 48
End: 2023-05-04

## 2023-05-08 DIAGNOSIS — B17.10 ACUTE HEPATITIS C W/OUT HEPATIC COMA: ICD-10-CM

## 2023-05-08 DIAGNOSIS — Z00.00 ENCOUNTER FOR GENERAL ADULT MEDICAL EXAMINATION W/OUT ABNORMAL FINDINGS: ICD-10-CM

## 2023-05-12 ENCOUNTER — RX RENEWAL (OUTPATIENT)
Age: 48
End: 2023-05-12

## 2023-05-15 ENCOUNTER — RX RENEWAL (OUTPATIENT)
Age: 48
End: 2023-05-15

## 2023-05-25 LAB
ALBUMIN SERPL ELPH-MCNC: 3.6 G/DL
ALP BLD-CCNC: 73 U/L
ALT SERPL-CCNC: 12 U/L
ANION GAP SERPL CALC-SCNC: 10 MMOL/L
AST SERPL-CCNC: 16 U/L
BILIRUB SERPL-MCNC: 1.2 MG/DL
BUN SERPL-MCNC: 14 MG/DL
CALCIUM SERPL-MCNC: 8.8 MG/DL
CHLORIDE SERPL-SCNC: 104 MMOL/L
CO2 SERPL-SCNC: 26 MMOL/L
CREAT SERPL-MCNC: 0.83 MG/DL
EGFR: 107 ML/MIN/1.73M2
GLUCOSE SERPL-MCNC: 115 MG/DL
HCV RNA SERPL NAA+PROBE-LOG IU: NOT DETECTED LOGIU/ML
HEPC RNA INTERP: NOT DETECTED
POTASSIUM SERPL-SCNC: 4.1 MMOL/L
PROT SERPL-MCNC: 7.2 G/DL
SODIUM SERPL-SCNC: 139 MMOL/L

## 2023-06-05 ENCOUNTER — NON-APPOINTMENT (OUTPATIENT)
Age: 48
End: 2023-06-05

## 2023-06-06 ENCOUNTER — NON-APPOINTMENT (OUTPATIENT)
Age: 48
End: 2023-06-06

## 2023-06-12 DIAGNOSIS — K46.9 UNSPECIFIED ABDOMINAL HERNIA W/OUT OBSTRUCTION OR GANGRENE: ICD-10-CM

## 2023-06-15 ENCOUNTER — NON-APPOINTMENT (OUTPATIENT)
Age: 48
End: 2023-06-15

## 2023-06-15 ENCOUNTER — APPOINTMENT (OUTPATIENT)
Dept: RADIATION ONCOLOGY | Facility: CLINIC | Age: 48
End: 2023-06-15
Payer: MEDICARE

## 2023-06-15 VITALS
OXYGEN SATURATION: 97 % | WEIGHT: 219 LBS | DIASTOLIC BLOOD PRESSURE: 77 MMHG | HEART RATE: 88 BPM | BODY MASS INDEX: 34.3 KG/M2 | RESPIRATION RATE: 16 BRPM | SYSTOLIC BLOOD PRESSURE: 131 MMHG

## 2023-06-15 PROCEDURE — 99212 OFFICE O/P EST SF 10 MIN: CPT

## 2023-06-17 NOTE — DISEASE MANAGEMENT
[FreeTextEntry4] : Kaposi's sarcoma [TTNM] : x [NTNM] : x [MTNM] : 1 [de-identified] : 0 [de-identified] : 0

## 2023-06-17 NOTE — LETTER CLOSING
[FreeTextEntry3] : Kel Davalos MD\par Physician in Chief\par Department of Radiation Medicine\par [facility]\par [facility]\par \par  of Radiation Medicine\par [facility]\par at  [facility]\par \par Radiation \par [facility]/\par [facility] [place]\par [address]\par [place], [place] [address]\par \par Tel: [phone]\par Fax: [phone]\par

## 2023-06-17 NOTE — PHYSICAL EXAM
[Normal] : oriented to person, place and time, the affect was normal, the mood was normal and not anxious [de-identified] : Right lower extremity chronic lymphedema; no evidence of Kaposi Sarcoma. [de-identified] : anxious [de-identified] :  small superficial unpigmented subcentimeter sessile nodularity of distal right  lower extremity

## 2023-06-17 NOTE — HISTORY OF PRESENT ILLNESS
[FreeTextEntry1] : This 48 year-old male presents for radiation medicine follow-up accompanied by his mother Nahomy.  Mr. Catherine has been under treatment for AIDS by Dr. Perez after life-threatening presentation to the hospital in September 2016.   Patient recalls testing negative in 2013, and states he was also negative by Oraquick test in 04/2016.  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  He was treated by Dr. Durbin with chemotherapy for the disease between 11/2016 and 4/5/2018. \par Never had radiotherapy.  \par \par After Dr. Durbin left the practice, the patient was evaluated for this diagnosis by Dr. Griffin, and then by Dr. Daniel Desouza November 6, 2020.  The patient has been under surveillance.\par \par 10/5/2016 -- 10/16/2016:  \par Admited to [facility] with respiratory failure, initial diagnosis of AIDS. CF4 = 4, CF4/8 = 0/12, HIV RNA VL = 10513.   Admitted with WBC 1.2, Hb 11.6, , Na 123, Creat 1.33, ABG 7.52/28/56, CXR with opacity in RLL.  He was treated with antibiotics, BiPap,  and care in MICU.   LEFT and RIGHT leg swelling was noted, U/S negative for clot. CT angio showed no PE. Multifocal pneumonia noted on CT.  Positive findings for enterovirus, rhinovirus, RSV, and HIV, oropharyngeal thrush.  Culture showed S. pneumonia.  AFB negative in sputum to date.  Hepatitis B also found positive.   Viral load 98 IU/mL.  U/S showed gallstones but no cholecystitis.  Also positive for oropharyngeal thrush.  He was also found to have severe protein calorie malnutrition. \par \par 10/25/2016: \par Biopsy of skin lesions showing Kaposki's sarcoma.\par \par At last visit in 12/2022 he noted improvement in LE edema. \par \par 6/15/2023- Mr. Catherine presents today for follow up. he continues to follow with Dr. Larson. Continues to follow with ID\par He is due for his annual Chest CT\par \par

## 2023-06-17 NOTE — DATA REVIEWED
[FreeTextEntry1] : pathology.\par labs May 24, 2023\par imaging studies: CT chest March 22, 2022\par medical record\par

## 2023-06-18 ENCOUNTER — RX RENEWAL (OUTPATIENT)
Age: 48
End: 2023-06-18

## 2023-06-26 ENCOUNTER — NON-APPOINTMENT (OUTPATIENT)
Age: 48
End: 2023-06-26

## 2023-06-26 ENCOUNTER — TRANSCRIPTION ENCOUNTER (OUTPATIENT)
Age: 48
End: 2023-06-26

## 2023-06-29 ENCOUNTER — TRANSCRIPTION ENCOUNTER (OUTPATIENT)
Age: 48
End: 2023-06-29

## 2023-07-19 ENCOUNTER — RX RENEWAL (OUTPATIENT)
Age: 48
End: 2023-07-19

## 2023-07-19 RX ORDER — MUPIROCIN 20 MG/G
2 OINTMENT TOPICAL
Qty: 44 | Refills: 0 | Status: ACTIVE | COMMUNITY
Start: 2019-11-04 | End: 1900-01-01

## 2023-08-15 ENCOUNTER — TRANSCRIPTION ENCOUNTER (OUTPATIENT)
Age: 48
End: 2023-08-15

## 2023-08-16 ENCOUNTER — LABORATORY RESULT (OUTPATIENT)
Age: 48
End: 2023-08-16

## 2023-08-17 LAB
25(OH)D3 SERPL-MCNC: 30 NG/ML
ALBUMIN SERPL ELPH-MCNC: 4.1 G/DL
ALP BLD-CCNC: 59 U/L
ALT SERPL-CCNC: 20 U/L
ANION GAP SERPL CALC-SCNC: 11 MMOL/L
APPEARANCE: CLEAR
AST SERPL-CCNC: 25 U/L
BACTERIA: NEGATIVE /HPF
BILIRUB SERPL-MCNC: 1.5 MG/DL
BILIRUBIN URINE: NEGATIVE
BLOOD URINE: NEGATIVE
BUN SERPL-MCNC: 15 MG/DL
C TRACH RRNA SPEC QL NAA+PROBE: NOT DETECTED
CALCIUM SERPL-MCNC: 9.2 MG/DL
CAST: 0 /LPF
CD3 CELLS # BLD: 1005 CELLS/UL
CD3 CELLS NFR BLD: 71 %
CD3+CD4+ CELLS # BLD: 283 CELLS/UL
CD3+CD4+ CELLS NFR BLD: 20 %
CD3+CD4+ CELLS/CD3+CD8+ CLL SPEC: 0.4 RATIO
CD3+CD8+ CELLS # SPEC: 715 CELLS/UL
CD3+CD8+ CELLS NFR BLD: 51 %
CHLORIDE SERPL-SCNC: 100 MMOL/L
CHOLEST SERPL-MCNC: 165 MG/DL
CO2 SERPL-SCNC: 23 MMOL/L
COLOR: YELLOW
CREAT SERPL-MCNC: 0.92 MG/DL
EGFR: 101 ML/MIN/1.73M2
EPITHELIAL CELLS: 0 /HPF
ESTIMATED AVERAGE GLUCOSE: 85 MG/DL
GLUCOSE QUALITATIVE U: NEGATIVE MG/DL
GLUCOSE SERPL-MCNC: 105 MG/DL
HBA1C MFR BLD HPLC: 4.6 %
HCV RNA SERPL NAA+PROBE-LOG IU: NOT DETECTED LOGIU/ML
HDLC SERPL-MCNC: 35 MG/DL
HEPC RNA INTERP: NOT DETECTED
HIV1 RNA # SERPL NAA+PROBE: NORMAL
HIV1 RNA # SERPL NAA+PROBE: NORMAL COPIES/ML
KETONES URINE: NEGATIVE MG/DL
LDLC SERPL CALC-MCNC: 113 MG/DL
LEUKOCYTE ESTERASE URINE: NEGATIVE
MICROSCOPIC-UA: NORMAL
N GONORRHOEA RRNA SPEC QL NAA+PROBE: NOT DETECTED
NITRITE URINE: NEGATIVE
NONHDLC SERPL-MCNC: 130 MG/DL
PH URINE: 6
POTASSIUM SERPL-SCNC: 4.4 MMOL/L
PROT SERPL-MCNC: 7.6 G/DL
PROTEIN URINE: NEGATIVE MG/DL
RED BLOOD CELLS URINE: 1 /HPF
SODIUM SERPL-SCNC: 134 MMOL/L
SOURCE AMPLIFICATION: NORMAL
SPECIFIC GRAVITY URINE: 1.02
TRIGL SERPL-MCNC: 95 MG/DL
UROBILINOGEN URINE: 1 MG/DL
VIRAL LOAD INTERP: NORMAL
VIRAL LOAD LOG: NORMAL LG COP/ML
WHITE BLOOD CELLS URINE: 0 /HPF

## 2023-08-18 LAB — T PALLIDUM AB SER QL IA: POSITIVE

## 2023-08-21 ENCOUNTER — RX RENEWAL (OUTPATIENT)
Age: 48
End: 2023-08-21

## 2023-08-31 ENCOUNTER — NON-APPOINTMENT (OUTPATIENT)
Age: 48
End: 2023-08-31

## 2023-08-31 DIAGNOSIS — G89.29 LOW BACK PAIN, UNSPECIFIED: ICD-10-CM

## 2023-08-31 DIAGNOSIS — M54.50 LOW BACK PAIN, UNSPECIFIED: ICD-10-CM

## 2023-09-06 ENCOUNTER — NON-APPOINTMENT (OUTPATIENT)
Age: 48
End: 2023-09-06

## 2023-09-11 ENCOUNTER — APPOINTMENT (OUTPATIENT)
Dept: INFECTIOUS DISEASE | Facility: CLINIC | Age: 48
End: 2023-09-11
Payer: MEDICARE

## 2023-09-11 VITALS
DIASTOLIC BLOOD PRESSURE: 89 MMHG | WEIGHT: 212 LBS | BODY MASS INDEX: 33.27 KG/M2 | OXYGEN SATURATION: 95 % | TEMPERATURE: 97.7 F | HEART RATE: 88 BPM | SYSTOLIC BLOOD PRESSURE: 136 MMHG | HEIGHT: 67 IN

## 2023-09-11 DIAGNOSIS — B19.10 UNSPECIFIED VIRAL HEPATITIS B W/OUT HEPATIC COMA: ICD-10-CM

## 2023-09-11 PROCEDURE — 90750 HZV VACC RECOMBINANT IM: CPT | Mod: GY

## 2023-09-11 PROCEDURE — 90471 IMMUNIZATION ADMIN: CPT

## 2023-09-11 PROCEDURE — 99214 OFFICE O/P EST MOD 30 MIN: CPT | Mod: 25

## 2023-09-11 RX ORDER — ERGOCALCIFEROL 1.25 MG/1
1.25 MG CAPSULE, LIQUID FILLED ORAL
Qty: 4 | Refills: 0 | Status: COMPLETED | COMMUNITY
Start: 2018-12-04 | End: 2023-09-11

## 2023-09-11 RX ORDER — GLECAPREVIR AND PIBRENTASVIR 40; 100 MG/1; MG/1
100-40 TABLET, FILM COATED ORAL
Qty: 84 | Refills: 0 | Status: COMPLETED | COMMUNITY
Start: 2023-03-21 | End: 2023-09-11

## 2023-09-11 RX ORDER — GUAIFENESIN/DM/ACETAMINOPHEN 20-650/20
LIQUID (ML) ORAL EVERY 8 HOURS
Qty: 1 | Refills: 0 | Status: COMPLETED | COMMUNITY
Start: 2023-02-15 | End: 2023-09-11

## 2023-09-12 LAB
C TRACH RRNA SPEC QL NAA+PROBE: NOT DETECTED
C TRACH RRNA SPEC QL NAA+PROBE: NOT DETECTED
N GONORRHOEA RRNA SPEC QL NAA+PROBE: NOT DETECTED
N GONORRHOEA RRNA SPEC QL NAA+PROBE: NOT DETECTED
SOURCE ANAL: NORMAL
SOURCE ORAL: NORMAL

## 2023-09-19 ENCOUNTER — RX RENEWAL (OUTPATIENT)
Age: 48
End: 2023-09-19

## 2023-09-19 RX ORDER — DOXYCYCLINE HYCLATE 100 MG/1
100 CAPSULE ORAL
Qty: 20 | Refills: 0 | Status: ACTIVE | COMMUNITY
Start: 2021-01-01 | End: 1900-01-01

## 2023-09-19 RX ORDER — FLUTICASONE PROPIONATE 50 UG/1
50 SPRAY, METERED NASAL
Qty: 16 | Refills: 0 | Status: ACTIVE | COMMUNITY
Start: 2017-02-22 | End: 1900-01-01

## 2023-09-20 ENCOUNTER — NON-APPOINTMENT (OUTPATIENT)
Age: 48
End: 2023-09-20

## 2023-09-22 ENCOUNTER — NON-APPOINTMENT (OUTPATIENT)
Age: 48
End: 2023-09-22

## 2023-10-23 ENCOUNTER — RX RENEWAL (OUTPATIENT)
Age: 48
End: 2023-10-23

## 2023-10-26 ENCOUNTER — OUTPATIENT (OUTPATIENT)
Dept: OUTPATIENT SERVICES | Facility: HOSPITAL | Age: 48
LOS: 1 days | Discharge: HOME | End: 2023-10-26

## 2023-10-26 ENCOUNTER — APPOINTMENT (OUTPATIENT)
Dept: PULMONOLOGY | Facility: CLINIC | Age: 48
End: 2023-10-26
Payer: MEDICARE

## 2023-10-26 VITALS
HEIGHT: 67 IN | DIASTOLIC BLOOD PRESSURE: 76 MMHG | WEIGHT: 214 LBS | BODY MASS INDEX: 33.59 KG/M2 | TEMPERATURE: 97.6 F | HEART RATE: 98 BPM | OXYGEN SATURATION: 96 % | RESPIRATION RATE: 16 BRPM | SYSTOLIC BLOOD PRESSURE: 138 MMHG

## 2023-10-26 DIAGNOSIS — J44.89 OTHER SPECIFIED CHRONIC OBSTRUCTIVE PULMONARY DISEASE: ICD-10-CM

## 2023-10-26 DIAGNOSIS — R60.0 LOCALIZED EDEMA: ICD-10-CM

## 2023-10-26 DIAGNOSIS — Z86.19 PERSONAL HISTORY OF OTHER INFECTIOUS AND PARASITIC DISEASES: Chronic | ICD-10-CM

## 2023-10-26 DIAGNOSIS — Z98.890 OTHER SPECIFIED POSTPROCEDURAL STATES: Chronic | ICD-10-CM

## 2023-10-26 DIAGNOSIS — C46.0 KAPOSI'S SARCOMA OF SKIN: ICD-10-CM

## 2023-10-26 DIAGNOSIS — Z86.14 PERSONAL HISTORY OF METHICILLIN RESISTANT STAPHYLOCOCCUS AUREUS INFECTION: Chronic | ICD-10-CM

## 2023-10-26 DIAGNOSIS — Z90.89 ACQUIRED ABSENCE OF OTHER ORGANS: Chronic | ICD-10-CM

## 2023-10-26 PROCEDURE — 99215 OFFICE O/P EST HI 40 MIN: CPT

## 2023-11-01 ENCOUNTER — RESULT REVIEW (OUTPATIENT)
Age: 48
End: 2023-11-01

## 2023-11-01 ENCOUNTER — APPOINTMENT (OUTPATIENT)
Dept: HEMATOLOGY ONCOLOGY | Facility: CLINIC | Age: 48
End: 2023-11-01
Payer: MEDICARE

## 2023-11-01 VITALS
HEIGHT: 67 IN | HEART RATE: 79 BPM | WEIGHT: 216.49 LBS | DIASTOLIC BLOOD PRESSURE: 78 MMHG | OXYGEN SATURATION: 96 % | BODY MASS INDEX: 33.98 KG/M2 | SYSTOLIC BLOOD PRESSURE: 125 MMHG

## 2023-11-01 DIAGNOSIS — C46.9 KAPOSI'S SARCOMA, UNSPECIFIED: ICD-10-CM

## 2023-11-01 LAB
BASOPHILS # BLD AUTO: 0.1 K/UL — SIGNIFICANT CHANGE UP (ref 0–0.2)
BASOPHILS # BLD AUTO: 0.1 K/UL — SIGNIFICANT CHANGE UP (ref 0–0.2)
BASOPHILS NFR BLD AUTO: 1.2 % — SIGNIFICANT CHANGE UP (ref 0–2)
BASOPHILS NFR BLD AUTO: 1.2 % — SIGNIFICANT CHANGE UP (ref 0–2)
EOSINOPHIL # BLD AUTO: 0.2 K/UL — SIGNIFICANT CHANGE UP (ref 0–0.5)
EOSINOPHIL # BLD AUTO: 0.2 K/UL — SIGNIFICANT CHANGE UP (ref 0–0.5)
EOSINOPHIL NFR BLD AUTO: 3.8 % — SIGNIFICANT CHANGE UP (ref 0–6)
EOSINOPHIL NFR BLD AUTO: 3.8 % — SIGNIFICANT CHANGE UP (ref 0–6)
HCT VFR BLD CALC: 46.6 % — SIGNIFICANT CHANGE UP (ref 39–50)
HCT VFR BLD CALC: 46.6 % — SIGNIFICANT CHANGE UP (ref 39–50)
HGB BLD-MCNC: 15.6 G/DL — SIGNIFICANT CHANGE UP (ref 13–17)
HGB BLD-MCNC: 15.6 G/DL — SIGNIFICANT CHANGE UP (ref 13–17)
LYMPHOCYTES # BLD AUTO: 0.8 K/UL — LOW (ref 1–3.3)
LYMPHOCYTES # BLD AUTO: 0.8 K/UL — LOW (ref 1–3.3)
LYMPHOCYTES # BLD AUTO: 15.8 % — SIGNIFICANT CHANGE UP (ref 13–44)
LYMPHOCYTES # BLD AUTO: 15.8 % — SIGNIFICANT CHANGE UP (ref 13–44)
MCHC RBC-ENTMCNC: 30.2 PG — SIGNIFICANT CHANGE UP (ref 27–34)
MCHC RBC-ENTMCNC: 30.2 PG — SIGNIFICANT CHANGE UP (ref 27–34)
MCHC RBC-ENTMCNC: 33.5 G/DL — SIGNIFICANT CHANGE UP (ref 32–36)
MCHC RBC-ENTMCNC: 33.5 G/DL — SIGNIFICANT CHANGE UP (ref 32–36)
MCV RBC AUTO: 90.3 FL — SIGNIFICANT CHANGE UP (ref 80–100)
MCV RBC AUTO: 90.3 FL — SIGNIFICANT CHANGE UP (ref 80–100)
MONOCYTES # BLD AUTO: 0.7 K/UL — SIGNIFICANT CHANGE UP (ref 0–0.9)
MONOCYTES # BLD AUTO: 0.7 K/UL — SIGNIFICANT CHANGE UP (ref 0–0.9)
MONOCYTES NFR BLD AUTO: 14.6 % — HIGH (ref 2–14)
MONOCYTES NFR BLD AUTO: 14.6 % — HIGH (ref 2–14)
NEUTROPHILS # BLD AUTO: 3.1 K/UL — SIGNIFICANT CHANGE UP (ref 1.8–7.4)
NEUTROPHILS # BLD AUTO: 3.1 K/UL — SIGNIFICANT CHANGE UP (ref 1.8–7.4)
NEUTROPHILS NFR BLD AUTO: 64.4 % — SIGNIFICANT CHANGE UP (ref 43–77)
NEUTROPHILS NFR BLD AUTO: 64.4 % — SIGNIFICANT CHANGE UP (ref 43–77)
PLATELET # BLD AUTO: 216 K/UL — SIGNIFICANT CHANGE UP (ref 150–400)
PLATELET # BLD AUTO: 216 K/UL — SIGNIFICANT CHANGE UP (ref 150–400)
RBC # BLD: 5.16 M/UL — SIGNIFICANT CHANGE UP (ref 4.2–5.8)
RBC # BLD: 5.16 M/UL — SIGNIFICANT CHANGE UP (ref 4.2–5.8)
RBC # FLD: 12.9 % — SIGNIFICANT CHANGE UP (ref 10.3–14.5)
RBC # FLD: 12.9 % — SIGNIFICANT CHANGE UP (ref 10.3–14.5)
WBC # BLD: 4.9 K/UL — SIGNIFICANT CHANGE UP (ref 3.8–10.5)
WBC # BLD: 4.9 K/UL — SIGNIFICANT CHANGE UP (ref 3.8–10.5)
WBC # FLD AUTO: 4.9 K/UL — SIGNIFICANT CHANGE UP (ref 3.8–10.5)
WBC # FLD AUTO: 4.9 K/UL — SIGNIFICANT CHANGE UP (ref 3.8–10.5)

## 2023-11-01 PROCEDURE — 99214 OFFICE O/P EST MOD 30 MIN: CPT

## 2023-11-02 ENCOUNTER — RX RENEWAL (OUTPATIENT)
Age: 48
End: 2023-11-02

## 2023-11-03 LAB
ALBUMIN SERPL ELPH-MCNC: 3.7 G/DL
ALP BLD-CCNC: 65 U/L
ALT SERPL-CCNC: 15 U/L
ANION GAP SERPL CALC-SCNC: 11 MMOL/L
AST SERPL-CCNC: 17 U/L
BILIRUB SERPL-MCNC: 1.3 MG/DL
BUN SERPL-MCNC: 14 MG/DL
CALCIUM SERPL-MCNC: 8.5 MG/DL
CHLORIDE SERPL-SCNC: 101 MMOL/L
CO2 SERPL-SCNC: 25 MMOL/L
CREAT SERPL-MCNC: 0.9 MG/DL
EGFR: 104 ML/MIN/1.73M2
GLUCOSE SERPL-MCNC: 118 MG/DL
LDH SERPL-CCNC: 186 U/L
POTASSIUM SERPL-SCNC: 3.9 MMOL/L
PROT SERPL-MCNC: 7.4 G/DL
SODIUM SERPL-SCNC: 137 MMOL/L

## 2023-11-09 NOTE — ED ADULT TRIAGE NOTE - CCCP TRG CHIEF CMPLNT
Date of Service: 11/08/2023    PAIN CLINIC NOTE    CHIEF COMPLAINT:    Sacral pain.    HISTORY OF PRESENT ILLNESS:    Radha is a 68-year-old female who has seen me for many years.  She has a history of lumbar radiculopathy with spondylolisthesis.  She has responded to epidural steroid injections in the past. She has been using Hydrocodone 7.5/325 up to 3 a day. In the past 90 tablets have lasted her more than 30 days. On 10/30/2023, approximately 11 days early, she called in for an early refill and this was denied.  She then called on this past Friday and stated she had fallen and needed some medications.  We gave her 24 tablets 4 a day to get her through until today as I was aware she is likely out of pain medication altogether.  We had a long talk today regarding early refills and that this is not possible, that she cannot call in for pain medications in the future.  She needs to be seen.  She states she fell out of bed, slipped and injured herself.  She denies a need for x-rays.  She is a retired ER physician and I will take her word at that. She has a history of anxiety and depression.  She is not on any benzodiazepines.  We discussed home safety and that she needs grab bars.  She states she has them. She does not have any loose rugs on the floor. As well, she apparently has a higher than average bed and it was recommended that she have this removed for a lower bed for her safety.    PHYSICAL EXAMINATION:    She is a 68-year-old female in a wheelchair in no acute distress.  Her gait is wide based and otherwise normal.    IMPRESSION:    1.  Lumbar radiculopathy.  2.  Lumbar spondylolisthesis.  3.  Recent injury.    PLAN:    A urine drug screen was done today.  Her PDMP was reviewed today.  We will renew her prescription going back to 3 a day on the 11th of this month when she is due.  She understands if there are any more requests or any more aberrant behavior we will discontinue writing medications for  her.      Dictated By: Zenon Ramirez MD  Signing Provider: MD DELMY Cancino/chano ([id number])   DD: 11/08/2023 2:22:37 PM TD: 11/09/2023 11:41:37 AM       foot pain/injury

## 2023-11-22 ENCOUNTER — RX RENEWAL (OUTPATIENT)
Age: 48
End: 2023-11-22

## 2023-11-24 ENCOUNTER — RX RENEWAL (OUTPATIENT)
Age: 48
End: 2023-11-24

## 2023-12-04 ENCOUNTER — APPOINTMENT (OUTPATIENT)
Dept: SURGERY | Facility: CLINIC | Age: 48
End: 2023-12-04
Payer: MEDICARE

## 2023-12-04 ENCOUNTER — NON-APPOINTMENT (OUTPATIENT)
Age: 48
End: 2023-12-04

## 2023-12-04 VITALS
WEIGHT: 200 LBS | HEART RATE: 77 BPM | HEIGHT: 68 IN | DIASTOLIC BLOOD PRESSURE: 79 MMHG | SYSTOLIC BLOOD PRESSURE: 123 MMHG | TEMPERATURE: 97.8 F | BODY MASS INDEX: 30.31 KG/M2

## 2023-12-04 DIAGNOSIS — K43.2 INCISIONAL HERNIA W/OUT OBSTRUCTION OR GANGRENE: ICD-10-CM

## 2023-12-04 PROCEDURE — 99203 OFFICE O/P NEW LOW 30 MIN: CPT

## 2023-12-05 ENCOUNTER — APPOINTMENT (OUTPATIENT)
Dept: CT IMAGING | Facility: CLINIC | Age: 48
End: 2023-12-05
Payer: MEDICARE

## 2023-12-05 ENCOUNTER — OUTPATIENT (OUTPATIENT)
Dept: OUTPATIENT SERVICES | Facility: HOSPITAL | Age: 48
LOS: 1 days | End: 2023-12-05
Payer: MEDICARE

## 2023-12-05 DIAGNOSIS — Z86.14 PERSONAL HISTORY OF METHICILLIN RESISTANT STAPHYLOCOCCUS AUREUS INFECTION: Chronic | ICD-10-CM

## 2023-12-05 DIAGNOSIS — Z98.890 OTHER SPECIFIED POSTPROCEDURAL STATES: Chronic | ICD-10-CM

## 2023-12-05 DIAGNOSIS — Z90.89 ACQUIRED ABSENCE OF OTHER ORGANS: Chronic | ICD-10-CM

## 2023-12-05 DIAGNOSIS — Z86.19 PERSONAL HISTORY OF OTHER INFECTIOUS AND PARASITIC DISEASES: Chronic | ICD-10-CM

## 2023-12-05 DIAGNOSIS — C78.00 SECONDARY MALIGNANT NEOPLASM OF UNSPECIFIED LUNG: ICD-10-CM

## 2023-12-05 PROCEDURE — 71250 CT THORAX DX C-: CPT

## 2023-12-05 PROCEDURE — 71250 CT THORAX DX C-: CPT | Mod: 26,MH

## 2023-12-13 ENCOUNTER — TRANSCRIPTION ENCOUNTER (OUTPATIENT)
Age: 48
End: 2023-12-13

## 2023-12-14 ENCOUNTER — TRANSCRIPTION ENCOUNTER (OUTPATIENT)
Age: 48
End: 2023-12-14

## 2023-12-14 ENCOUNTER — APPOINTMENT (OUTPATIENT)
Dept: OPHTHALMOLOGY | Facility: CLINIC | Age: 48
End: 2023-12-14
Payer: MEDICARE

## 2023-12-14 ENCOUNTER — NON-APPOINTMENT (OUTPATIENT)
Age: 48
End: 2023-12-14

## 2023-12-14 PROCEDURE — 92004 COMPRE OPH EXAM NEW PT 1/>: CPT

## 2023-12-18 ENCOUNTER — NON-APPOINTMENT (OUTPATIENT)
Age: 48
End: 2023-12-18

## 2023-12-21 ENCOUNTER — NON-APPOINTMENT (OUTPATIENT)
Age: 48
End: 2023-12-21

## 2023-12-21 ENCOUNTER — TRANSCRIPTION ENCOUNTER (OUTPATIENT)
Age: 48
End: 2023-12-21

## 2023-12-23 ENCOUNTER — RX RENEWAL (OUTPATIENT)
Age: 48
End: 2023-12-23

## 2023-12-23 RX ORDER — CHLORHEXIDINE GLUCONATE 213 G/1000ML
4 SOLUTION TOPICAL
Qty: 118 | Refills: 0 | Status: ACTIVE | COMMUNITY
Start: 2021-02-23 | End: 1900-01-01

## 2023-12-27 ENCOUNTER — NON-APPOINTMENT (OUTPATIENT)
Age: 48
End: 2023-12-27

## 2023-12-28 ENCOUNTER — OUTPATIENT (OUTPATIENT)
Dept: OUTPATIENT SERVICES | Facility: HOSPITAL | Age: 48
LOS: 1 days | Discharge: HOME | End: 2023-12-28
Payer: MEDICARE

## 2023-12-28 DIAGNOSIS — Z98.890 OTHER SPECIFIED POSTPROCEDURAL STATES: Chronic | ICD-10-CM

## 2023-12-28 DIAGNOSIS — F41.9 ANXIETY DISORDER, UNSPECIFIED: ICD-10-CM

## 2023-12-28 DIAGNOSIS — Z01.818 ENCOUNTER FOR OTHER PREPROCEDURAL EXAMINATION: ICD-10-CM

## 2023-12-28 DIAGNOSIS — K43.2 INCISIONAL HERNIA WITHOUT OBSTRUCTION OR GANGRENE: ICD-10-CM

## 2023-12-28 DIAGNOSIS — Z86.14 PERSONAL HISTORY OF METHICILLIN RESISTANT STAPHYLOCOCCUS AUREUS INFECTION: Chronic | ICD-10-CM

## 2023-12-28 DIAGNOSIS — Z90.89 ACQUIRED ABSENCE OF OTHER ORGANS: Chronic | ICD-10-CM

## 2023-12-28 DIAGNOSIS — Z86.19 PERSONAL HISTORY OF OTHER INFECTIOUS AND PARASITIC DISEASES: Chronic | ICD-10-CM

## 2023-12-28 DIAGNOSIS — Z21 ASYMPTOMATIC HUMAN IMMUNODEFICIENCY VIRUS [HIV] INFECTION STATUS: ICD-10-CM

## 2023-12-28 LAB
ANION GAP SERPL CALC-SCNC: 4 MMOL/L — LOW (ref 5–17)
ANION GAP SERPL CALC-SCNC: 4 MMOL/L — LOW (ref 5–17)
BUN SERPL-MCNC: 16 MG/DL — SIGNIFICANT CHANGE UP (ref 7–23)
BUN SERPL-MCNC: 16 MG/DL — SIGNIFICANT CHANGE UP (ref 7–23)
CALCIUM SERPL-MCNC: 8.4 MG/DL — LOW (ref 8.5–10.1)
CALCIUM SERPL-MCNC: 8.4 MG/DL — LOW (ref 8.5–10.1)
CHLORIDE SERPL-SCNC: 104 MMOL/L — SIGNIFICANT CHANGE UP (ref 96–108)
CHLORIDE SERPL-SCNC: 104 MMOL/L — SIGNIFICANT CHANGE UP (ref 96–108)
CO2 SERPL-SCNC: 27 MMOL/L — SIGNIFICANT CHANGE UP (ref 22–31)
CO2 SERPL-SCNC: 27 MMOL/L — SIGNIFICANT CHANGE UP (ref 22–31)
CREAT SERPL-MCNC: 0.93 MG/DL — SIGNIFICANT CHANGE UP (ref 0.5–1.3)
CREAT SERPL-MCNC: 0.93 MG/DL — SIGNIFICANT CHANGE UP (ref 0.5–1.3)
EGFR: 100 ML/MIN/1.73M2 — SIGNIFICANT CHANGE UP
EGFR: 100 ML/MIN/1.73M2 — SIGNIFICANT CHANGE UP
GLUCOSE SERPL-MCNC: 101 MG/DL — HIGH (ref 70–99)
GLUCOSE SERPL-MCNC: 101 MG/DL — HIGH (ref 70–99)
HCT VFR BLD CALC: 45 % — SIGNIFICANT CHANGE UP (ref 39–50)
HCT VFR BLD CALC: 45 % — SIGNIFICANT CHANGE UP (ref 39–50)
HGB BLD-MCNC: 14.6 G/DL — SIGNIFICANT CHANGE UP (ref 13–17)
HGB BLD-MCNC: 14.6 G/DL — SIGNIFICANT CHANGE UP (ref 13–17)
MCHC RBC-ENTMCNC: 28.5 PG — SIGNIFICANT CHANGE UP (ref 27–34)
MCHC RBC-ENTMCNC: 28.5 PG — SIGNIFICANT CHANGE UP (ref 27–34)
MCHC RBC-ENTMCNC: 32.4 G/DL — SIGNIFICANT CHANGE UP (ref 32–36)
MCHC RBC-ENTMCNC: 32.4 G/DL — SIGNIFICANT CHANGE UP (ref 32–36)
MCV RBC AUTO: 87.9 FL — SIGNIFICANT CHANGE UP (ref 80–100)
MCV RBC AUTO: 87.9 FL — SIGNIFICANT CHANGE UP (ref 80–100)
NRBC # BLD: 0 /100 WBCS — SIGNIFICANT CHANGE UP (ref 0–0)
NRBC # BLD: 0 /100 WBCS — SIGNIFICANT CHANGE UP (ref 0–0)
PLATELET # BLD AUTO: 275 K/UL — SIGNIFICANT CHANGE UP (ref 150–400)
PLATELET # BLD AUTO: 275 K/UL — SIGNIFICANT CHANGE UP (ref 150–400)
POTASSIUM SERPL-MCNC: 3.6 MMOL/L — SIGNIFICANT CHANGE UP (ref 3.5–5.3)
POTASSIUM SERPL-MCNC: 3.6 MMOL/L — SIGNIFICANT CHANGE UP (ref 3.5–5.3)
POTASSIUM SERPL-SCNC: 3.6 MMOL/L — SIGNIFICANT CHANGE UP (ref 3.5–5.3)
POTASSIUM SERPL-SCNC: 3.6 MMOL/L — SIGNIFICANT CHANGE UP (ref 3.5–5.3)
RBC # BLD: 5.12 M/UL — SIGNIFICANT CHANGE UP (ref 4.2–5.8)
RBC # BLD: 5.12 M/UL — SIGNIFICANT CHANGE UP (ref 4.2–5.8)
RBC # FLD: 14.6 % — HIGH (ref 10.3–14.5)
RBC # FLD: 14.6 % — HIGH (ref 10.3–14.5)
SODIUM SERPL-SCNC: 135 MMOL/L — SIGNIFICANT CHANGE UP (ref 135–145)
SODIUM SERPL-SCNC: 135 MMOL/L — SIGNIFICANT CHANGE UP (ref 135–145)
WBC # BLD: 4.83 K/UL — SIGNIFICANT CHANGE UP (ref 3.8–10.5)
WBC # BLD: 4.83 K/UL — SIGNIFICANT CHANGE UP (ref 3.8–10.5)
WBC # FLD AUTO: 4.83 K/UL — SIGNIFICANT CHANGE UP (ref 3.8–10.5)
WBC # FLD AUTO: 4.83 K/UL — SIGNIFICANT CHANGE UP (ref 3.8–10.5)

## 2023-12-28 PROCEDURE — 93010 ELECTROCARDIOGRAM REPORT: CPT

## 2023-12-28 RX ORDER — SODIUM CHLORIDE 9 MG/ML
3 INJECTION INTRAMUSCULAR; INTRAVENOUS; SUBCUTANEOUS EVERY 8 HOURS
Refills: 0 | Status: DISCONTINUED | OUTPATIENT
Start: 2024-01-11 | End: 2024-01-25

## 2023-12-28 RX ORDER — NALOXONE HYDROCHLORIDE 4 MG/.1ML
4 SPRAY NASAL
Qty: 1 | Refills: 2 | Status: ACTIVE | COMMUNITY
Start: 2023-12-27

## 2023-12-28 NOTE — H&P PST ADULT - NSICDXPASTMEDICALHX_GEN_ALL_CORE_FT
PAST MEDICAL HISTORY:  Anxiety     Elephantiasis nostras verrucosa     Hepatitis B     Hepatitis B infection without delta agent without hepatic coma, unspecified chronicity     HIV (human immunodeficiency virus infection)     Kaposi's sarcoma of skin     Kaposi's sarcoma of skin s/p chemo 11/2016 - 5/2017 and 11/ 2017 - 4/2018    Substance abuse last use 2005 PAST MEDICAL HISTORY:  Anxiety     Elephantiasis nostras verrucosa     Hepatitis B     Hepatitis B infection without delta agent without hepatic coma, unspecified chronicity     HIV (human immunodeficiency virus infection)     Kaposi's sarcoma of skin     Kaposi's sarcoma of skin s/p chemo 11/2016 - 5/2017 and 11/ 2017 - 4/2018    Substance abuse last use 2005     PAST MEDICAL HISTORY:  Anxiety     Asthma, exercise induced     Elephantiasis nostras verrucosa     Hepatitis B     HIV (human immunodeficiency virus infection)     Kaposi's sarcoma of skin     Substance abuse last use 2005

## 2023-12-28 NOTE — H&P PST ADULT - NSICDXPASTSURGICALHX_GEN_ALL_CORE_FT
PAST SURGICAL HISTORY:  H/O mastoidectomy 1998    H/O syphilis     History of MRSA infection     S/P arthroscopic surgery of right knee 1992    S/P wrist surgery right 1985 PAST SURGICAL HISTORY:  H/O mastoidectomy 1998    H/O syphilis     H/O umbilical hernia repair     History of MRSA infection     S/P arthroscopic surgery of right knee 1992    S/P wrist surgery right 1985

## 2023-12-28 NOTE — H&P PST ADULT - ASSESSMENT
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), hepatitis B infection, elephantiasis, asthma ( denies intubation) presents to PST for scheduled incisional hernia repair with Dr.Held CAPRINI SCORE [CLOT]    AGE RELATED RISK FACTORS                                                       MOBILITY RELATED FACTORS  [ X] Age 41-60 years                                            (1 Point)                  [ ] Bed rest                                                        (1 Point)  [ ] Age: 61-74 years                                           (2 Points)                 [ ] Plaster cast                                                   (2 Points)  [ ] Age= 75 years                                              (3 Points)                 [ ] Bed bound for more than 72 hours                 (2 Points)    DISEASE RELATED RISK FACTORS                                               GENDER SPECIFIC FACTORS  [ X] Edema in the lower extremities                       (1 Point)                  [ ] Pregnancy                                                     (1 Point)  [ ] Varicose veins                                               (1 Point)                  [ ] Post-partum < 6 weeks                                   (1 Point)             [X ] BMI > 25 Kg/m2                                            (1 Point)                  [ ] Hormonal therapy  or oral contraception          (1 Point)                 [ ] Sepsis (in the previous month)                        (1 Point)                  [ ] History of pregnancy complications                 (1 point)  [ ] Pneumonia or serious lung disease                                               [ ] Unexplained or recurrent                     (1 Point)           (in the previous month)                               (1 Point)  [ ] Abnormal pulmonary function test                     (1 Point)                 SURGERY RELATED RISK FACTORS  [ ] Acute myocardial infarction                              (1 Point)                 [ ]  Section                                             (1 Point)  [ ] Congestive heart failure (in the previous month)  (1 Point)               [ ] Minor surgery                                                  (1 Point)   [ ] Inflammatory bowel disease                             (1 Point)                 [ ] Arthroscopic surgery                                        (2 Points)  [ ] Central venous access                                      (2 Points)                [ X] General surgery lasting more than 45 minutes   (2 Points)       [ ] Stroke (in the previous month)                          (5 Points)               [ ] Elective arthroplasty                                         (5 Points)                                                                                                                                               HEMATOLOGY RELATED FACTORS                                                 TRAUMA RELATED RISK FACTORS  [ ] Prior episodes of VTE                                     (3 Points)                [ ] Fracture of the hip, pelvis, or leg                       (5 Points)  [ ] Positive family history for VTE                         (3 Points)                 [ ] Acute spinal cord injury (in the previous month)  (5 Points)  [ ] Prothrombin 55672 A                                     (3 Points)                 [ ] Paralysis  (less than 1 month)                             (5 Points)  [ ] Factor V Leiden                                             (3 Points)                  [ ] Multiple Trauma within 1 month                        (5 Points)  [ ] Lupus anticoagulants                                     (3 Points)                                                           [ ] Anticardiolipin antibodies                               (3 Points)                                                       [ ] High homocysteine in the blood                      (3 Points)                                             [ ] Other congenital or acquired thrombophilia      (3 Points)                                                [ ] Heparin induced thrombocytopenia                  (3 Points)                                          Total Score [      5    ]    Caprini Score 0 - 2:  Low Risk, No VTE Prophylaxis required for most patients, encourage ambulation  Caprini Score 3 - 6:  At Risk, pharmacologic VTE prophylaxis is indicated for most patients (in the absence of a contraindication)  Caprini Score Greater than or = 7:  High Risk, pharmacologic VTE prophylaxis is indicated for most patients (in the absence of a contraindication) 48M pmh HIV, 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), hepatitis B infection, elephantiasis, asthma ( denies intubation) presents to PST for scheduled incisional hernia repair with Dr.Held CAPRINI SCORE [CLOT]    AGE RELATED RISK FACTORS                                                       MOBILITY RELATED FACTORS  [ X] Age 41-60 years                                            (1 Point)                  [ ] Bed rest                                                        (1 Point)  [ ] Age: 61-74 years                                           (2 Points)                 [ ] Plaster cast                                                   (2 Points)  [ ] Age= 75 years                                              (3 Points)                 [ ] Bed bound for more than 72 hours                 (2 Points)    DISEASE RELATED RISK FACTORS                                               GENDER SPECIFIC FACTORS  [ X] Edema in the lower extremities                       (1 Point)                  [ ] Pregnancy                                                     (1 Point)  [ ] Varicose veins                                               (1 Point)                  [ ] Post-partum < 6 weeks                                   (1 Point)             [X ] BMI > 25 Kg/m2                                            (1 Point)                  [ ] Hormonal therapy  or oral contraception          (1 Point)                 [ ] Sepsis (in the previous month)                        (1 Point)                  [ ] History of pregnancy complications                 (1 point)  [ ] Pneumonia or serious lung disease                                               [ ] Unexplained or recurrent                     (1 Point)           (in the previous month)                               (1 Point)  [ ] Abnormal pulmonary function test                     (1 Point)                 SURGERY RELATED RISK FACTORS  [ ] Acute myocardial infarction                              (1 Point)                 [ ]  Section                                             (1 Point)  [ ] Congestive heart failure (in the previous month)  (1 Point)               [ ] Minor surgery                                                  (1 Point)   [ ] Inflammatory bowel disease                             (1 Point)                 [ ] Arthroscopic surgery                                        (2 Points)  [ ] Central venous access                                      (2 Points)                [ X] General surgery lasting more than 45 minutes   (2 Points)       [ ] Stroke (in the previous month)                          (5 Points)               [ ] Elective arthroplasty                                         (5 Points)                                                                                                                                               HEMATOLOGY RELATED FACTORS                                                 TRAUMA RELATED RISK FACTORS  [ ] Prior episodes of VTE                                     (3 Points)                [ ] Fracture of the hip, pelvis, or leg                       (5 Points)  [ ] Positive family history for VTE                         (3 Points)                 [ ] Acute spinal cord injury (in the previous month)  (5 Points)  [ ] Prothrombin 62354 A                                     (3 Points)                 [ ] Paralysis  (less than 1 month)                             (5 Points)  [ ] Factor V Leiden                                             (3 Points)                  [ ] Multiple Trauma within 1 month                        (5 Points)  [ ] Lupus anticoagulants                                     (3 Points)                                                           [ ] Anticardiolipin antibodies                               (3 Points)                                                       [ ] High homocysteine in the blood                      (3 Points)                                             [ ] Other congenital or acquired thrombophilia      (3 Points)                                                [ ] Heparin induced thrombocytopenia                  (3 Points)                                          Total Score [      5    ]    Caprini Score 0 - 2:  Low Risk, No VTE Prophylaxis required for most patients, encourage ambulation  Caprini Score 3 - 6:  At Risk, pharmacologic VTE prophylaxis is indicated for most patients (in the absence of a contraindication)  Caprini Score Greater than or = 7:  High Risk, pharmacologic VTE prophylaxis is indicated for most patients (in the absence of a contraindication)

## 2023-12-28 NOTE — H&P PST ADULT - HISTORY OF PRESENT ILLNESS
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

## 2023-12-28 NOTE — H&P PST ADULT - PROBLEM SELECTOR PLAN 1
Labs-CBC, BMP, EKG   Medical/pulmonary clearance required    Preop Hibiclens x 1 day instructions reviewed and given. Instructed on if Cx is positive use Mupirocin 5 days and checklist given in booklet   Take routine meds DOS with small sips of water, avoid NSAIDs and OTC supplements  Anesthesiologist to review PST labs, EKG, required clearances, and optimization for surgery

## 2024-01-02 PROBLEM — J45.990 EXERCISE INDUCED BRONCHOSPASM: Chronic | Status: ACTIVE | Noted: 2023-12-28

## 2024-01-03 ENCOUNTER — APPOINTMENT (OUTPATIENT)
Dept: PULMONOLOGY | Facility: CLINIC | Age: 49
End: 2024-01-03

## 2024-01-04 ENCOUNTER — NON-APPOINTMENT (OUTPATIENT)
Age: 49
End: 2024-01-04

## 2024-01-09 ENCOUNTER — NON-APPOINTMENT (OUTPATIENT)
Age: 49
End: 2024-01-09

## 2024-01-10 ENCOUNTER — APPOINTMENT (OUTPATIENT)
Dept: RADIATION ONCOLOGY | Facility: CLINIC | Age: 49
End: 2024-01-10
Payer: MEDICARE

## 2024-01-10 ENCOUNTER — TRANSCRIPTION ENCOUNTER (OUTPATIENT)
Age: 49
End: 2024-01-10

## 2024-01-10 VITALS
RESPIRATION RATE: 16 BRPM | SYSTOLIC BLOOD PRESSURE: 136 MMHG | DIASTOLIC BLOOD PRESSURE: 89 MMHG | HEART RATE: 81 BPM | OXYGEN SATURATION: 98 %

## 2024-01-10 DIAGNOSIS — C46.9 HUMAN IMMUNODEFICIENCY VIRUS [HIV] DISEASE: ICD-10-CM

## 2024-01-10 DIAGNOSIS — Q82.1: ICD-10-CM

## 2024-01-10 DIAGNOSIS — I89.0 LYMPHEDEMA, NOT ELSEWHERE CLASSIFIED: ICD-10-CM

## 2024-01-10 DIAGNOSIS — B20 HUMAN IMMUNODEFICIENCY VIRUS [HIV] DISEASE: ICD-10-CM

## 2024-01-10 PROCEDURE — 99213 OFFICE O/P EST LOW 20 MIN: CPT

## 2024-01-11 ENCOUNTER — TRANSCRIPTION ENCOUNTER (OUTPATIENT)
Age: 49
End: 2024-01-11

## 2024-01-11 ENCOUNTER — OUTPATIENT (OUTPATIENT)
Dept: OUTPATIENT SERVICES | Facility: HOSPITAL | Age: 49
LOS: 1 days | Discharge: HOME | End: 2024-01-11
Payer: MEDICARE

## 2024-01-11 ENCOUNTER — APPOINTMENT (OUTPATIENT)
Dept: SURGERY | Facility: HOSPITAL | Age: 49
End: 2024-01-11

## 2024-01-11 VITALS
TEMPERATURE: 98 F | RESPIRATION RATE: 16 BRPM | HEART RATE: 76 BPM | SYSTOLIC BLOOD PRESSURE: 133 MMHG | OXYGEN SATURATION: 95 % | DIASTOLIC BLOOD PRESSURE: 86 MMHG

## 2024-01-11 VITALS
HEART RATE: 74 BPM | TEMPERATURE: 98 F | WEIGHT: 214.07 LBS | DIASTOLIC BLOOD PRESSURE: 75 MMHG | SYSTOLIC BLOOD PRESSURE: 122 MMHG | HEIGHT: 68 IN | OXYGEN SATURATION: 96 % | RESPIRATION RATE: 17 BRPM

## 2024-01-11 DIAGNOSIS — Z98.890 OTHER SPECIFIED POSTPROCEDURAL STATES: Chronic | ICD-10-CM

## 2024-01-11 DIAGNOSIS — Z90.89 ACQUIRED ABSENCE OF OTHER ORGANS: Chronic | ICD-10-CM

## 2024-01-11 DIAGNOSIS — Z86.14 PERSONAL HISTORY OF METHICILLIN RESISTANT STAPHYLOCOCCUS AUREUS INFECTION: Chronic | ICD-10-CM

## 2024-01-11 DIAGNOSIS — Z86.19 PERSONAL HISTORY OF OTHER INFECTIOUS AND PARASITIC DISEASES: Chronic | ICD-10-CM

## 2024-01-11 PROCEDURE — S2900 ROBOTIC SURGICAL SYSTEM: CPT | Mod: NC,AS

## 2024-01-11 PROCEDURE — 49616 RPR AA HRN RCR 3-10 NCR/STRN: CPT

## 2024-01-11 PROCEDURE — 49616 RPR AA HRN RCR 3-10 NCR/STRN: CPT | Mod: AS

## 2024-01-11 DEVICE — MESH HERNIA VENTRAL / INCISIONAL PARIETEX PROGRIP 30 X 15CM: Type: IMPLANTABLE DEVICE | Status: FUNCTIONAL

## 2024-01-11 RX ORDER — FLUTICASONE FUROATE AND VILANTEROL TRIFENATATE 100; 25 UG/1; UG/1
1 POWDER RESPIRATORY (INHALATION)
Refills: 0 | DISCHARGE

## 2024-01-11 RX ORDER — SODIUM CHLORIDE 9 MG/ML
1000 INJECTION, SOLUTION INTRAVENOUS
Refills: 0 | Status: DISCONTINUED | OUTPATIENT
Start: 2024-01-11 | End: 2024-01-11

## 2024-01-11 RX ORDER — CLONAZEPAM 1 MG
1 TABLET ORAL
Qty: 0 | Refills: 0 | DISCHARGE

## 2024-01-11 RX ORDER — SERTRALINE 25 MG/1
1 TABLET, FILM COATED ORAL
Refills: 0 | DISCHARGE

## 2024-01-11 RX ORDER — FENTANYL CITRATE 50 UG/ML
50 INJECTION INTRAVENOUS
Refills: 0 | Status: DISCONTINUED | OUTPATIENT
Start: 2024-01-11 | End: 2024-01-11

## 2024-01-11 RX ORDER — BICTEGRAVIR SODIUM, EMTRICITABINE, AND TENOFOVIR ALAFENAMIDE FUMARATE 30; 120; 15 MG/1; MG/1; MG/1
1 TABLET ORAL
Refills: 0 | DISCHARGE

## 2024-01-11 RX ORDER — TADALAFIL 10 MG/1
1 TABLET, FILM COATED ORAL
Refills: 0 | DISCHARGE

## 2024-01-11 RX ORDER — ONDANSETRON 8 MG/1
4 TABLET, FILM COATED ORAL ONCE
Refills: 0 | Status: COMPLETED | OUTPATIENT
Start: 2024-01-11 | End: 2024-01-11

## 2024-01-11 RX ORDER — FENTANYL CITRATE 50 UG/ML
25 INJECTION INTRAVENOUS
Refills: 0 | Status: DISCONTINUED | OUTPATIENT
Start: 2024-01-11 | End: 2024-01-11

## 2024-01-11 RX ADMIN — FENTANYL CITRATE 25 MICROGRAM(S): 50 INJECTION INTRAVENOUS at 13:29

## 2024-01-11 RX ADMIN — ONDANSETRON 4 MILLIGRAM(S): 8 TABLET, FILM COATED ORAL at 14:22

## 2024-01-11 RX ADMIN — SODIUM CHLORIDE 75 MILLILITER(S): 9 INJECTION, SOLUTION INTRAVENOUS at 13:30

## 2024-01-11 NOTE — ASU DISCHARGE PLAN (ADULT/PEDIATRIC) - NS MD DC FALL RISK RISK
For information on Fall & Injury Prevention, visit: [email or web address]/[email or web address] OR  [email or web address]/[email or web address]-[email or web address]-injury OR  https://www.cdc.gov/steadi/patient.html For information on Fall & Injury Prevention, visit: [email or web address]-[email or web address]-mobility OR  [email or web address]://[email or web address]/[email or web address]-[email or web address]-[email or web address]-injury OR  https://www.cdc.gov/steadi/patient.html

## 2024-01-11 NOTE — ASU PATIENT PROFILE, ADULT - NSICDXPASTMEDICALHX_GEN_ALL_CORE_FT
PAST MEDICAL HISTORY:  Anxiety     Asthma, exercise induced     Elephantiasis [place]     Hepatitis B     HIV (human immunodeficiency virus infection)     Kaposi's sarcoma of skin     Substance abuse last use 2005

## 2024-01-11 NOTE — ASU PATIENT PROFILE, ADULT - NSICDXPASTSURGICALHX_GEN_ALL_CORE_FT
PAST SURGICAL HISTORY:  H/O mastoidectomy 1998    H/O syphilis     H/O umbilical hernia repair     History of MRSA infection     S/P arthroscopic surgery of right knee 1992    S/P wrist surgery right 1985

## 2024-01-11 NOTE — ASU PATIENT PROFILE, ADULT - FALL HARM RISK - UNIVERSAL INTERVENTIONS
Bed in lowest position, wheels locked, appropriate side rails in place/Call bell, personal items and telephone in reach/Instruct patient to call for assistance before getting out of bed or chair/Non-slip footwear when patient is out of bed/[place] to call system/Physically safe environment - no spills, clutter or unnecessary equipment/Purposeful Proactive Rounding/Room/bathroom lighting operational, light cord in reach Bed in lowest position, wheels locked, appropriate side rails in place/Call bell, personal items and telephone in reach/Instruct patient to call for assistance before getting out of bed or chair/Non-slip footwear when patient is out of bed/[place] to call system/Physically safe environment - no spills, clutter or unnecessary equipment/Purposeful Proactive Rounding/Room/bathroom lighting operational, light cord in reach

## 2024-01-11 NOTE — BRIEF OPERATIVE NOTE - NSICDXBRIEFPROCEDURE_GEN_ALL_CORE_FT
PROCEDURES:  Repair, hernia, reducible, abdominal wall, anterior, recurrent, 3 cm to 10 cm, with mesh insertion 11-Jan-2024 12:52:21  Berto Coreas

## 2024-01-11 NOTE — BRIEF OPERATIVE NOTE - OPERATION/FINDINGS
Robotic assisted repair with 25x15 progrip placed in retrorectus space. defect 5-7cm  smaller umbilical defect

## 2024-01-13 PROBLEM — B20: Status: ACTIVE | Noted: 2022-03-23

## 2024-01-13 PROBLEM — Q82.1: Status: ACTIVE | Noted: 2017-10-10

## 2024-01-13 PROBLEM — I89.0 LYMPHEDEMA OF RIGHT LOWER EXTREMITY: Status: ACTIVE | Noted: 2019-03-22

## 2024-01-13 NOTE — LETTER CLOSING
[Sincerely yours,] : Sincerely yours, [FreeTextEntry3] : Kel Davalos MD\par  Physician in Chief\par  Department of Radiation Medicine\par  [facility] [place]\par  [facility] [place]\par  \par   of Radiation Medicine\par  [facility]\par  at  [facility] [place]\par  \par  Radiation \par  [facility]/\par  [facility] [place]\par  [address]\par  [place], [place] [address]\par  \par  Tel: [phone]\par  Fax: [phone]\par

## 2024-01-13 NOTE — LETTER GREETING
[Dear Doctor] : Dear Doctor, [Follow-Up] : Your patient, [unfilled] was seen in my office today for follow-up [Please see my note below.] : Please see my note below. [FreeTextEntry2] : MD Becky Caputo MD

## 2024-01-13 NOTE — REVIEW OF SYSTEMS
[Negative] : Allergic/Immunologic [FreeTextEntry6] : on respiratory maintenance and rescue medications  [de-identified] : Lower extremity lymphedema R>L [FreeTextEntry1] : AIDS

## 2024-01-13 NOTE — HISTORY OF PRESENT ILLNESS
[FreeTextEntry1] : This 48 year-old male presents for radiation medicine follow-up accompanied by his mother Nahomy.  Mr. Catherine has been under treatment for AIDS by Dr. Perez after life-threatening presentation to the hospital in September 2016.   Patient recalls testing negative in 2013, and states he was also negative by Oraquick test in 04/2016.  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  He was treated by Dr. Durbin with chemotherapy for the disease between 11/2016 and 4/5/2018.  He never had radiotherapy.    After Dr. Durbin left the practice, the patient was evaluated for this diagnosis by Dr. Griffin, and then by Dr. Daniel Desouza November 6, 2020.  The patient has been under surveillance.  10/5/2016 -- 10/16/2016:   Admited to [facility] with respiratory failure, initial diagnosis of AIDS. CF4 = 4, CF4/8 = 0/12, HIV RNA VL = 91962.   Admitted with WBC 1.2, Hb 11.6, , Na 123, Creat 1.33, ABG 7.52/28/56, CXR with opacity in RLL.  He was treated with antibiotics, BiPap,  and care in MICU.   LEFT and RIGHT leg swelling was noted, U/S negative for clot. CT angio showed no PE. Multifocal pneumonia noted on CT.  Positive findings for enterovirus, rhinovirus, RSV, and HIV, oropharyngeal thrush.  Culture showed S. pneumonia.  AFB negative in sputum to date.  Hepatitis B also found positive.   Viral load 98 IU/mL.  U/S showed gallstones but no cholecystitis.  Also positive for oropharyngeal thrush.  He was also found to have severe protein calorie malnutrition.   10/25/2016:  Biopsy of skin lesions showing Kaposki's sarcoma.  He presents today with chronic lymphedema of the right lower extremity.  He reports taking tramadol the chronic pain. He reports he was followed by PMR Dr. Ary Adams.  At her direction, he was using compression garment which he finds very difficult to wear.  He reports more recently has been using compression devices.   Reports improvement in breathing since starting Breo Elipta.  Notes he takes rescue inhaler 1X daily.  Breathe sounds CTA B/L  B/L lower extremities with edema that waxes and wanes R>L.  Mr. Catherine presents today with much improved lymphedema, no dimpling or blistering.  Patient reports using mechanical compression devices of late.    Now follows with Dr. Larson.  The previously noted small superficial, unpigmented subcentimeter sessile nodularity of distal right lower extremity resolved.  Right lower extremity/foot with +3 edema, left side with +2 edema.  Skin intact both lower extremities.  Patient pointed out a freckle size dark spot on top of swollen right foot near tarsal/metatarsal joint.    Patient notes he is scheduled for abdominal hernia surgery tomorrow at [facility].

## 2024-01-13 NOTE — PHYSICAL EXAM
[Obese] : obese [Normal] : no focal deficits [de-identified] : Right lower extremity chronic lymphedema [de-identified] :  small superficial unpigmented subcentimeter sessile nodularity of distal right  lower extremity, few with scabbing/excoriation [de-identified] : anxious

## 2024-01-13 NOTE — VITALS
[Pain Location: ___] : Pain Location: [unfilled] [Date: ____________] : Patient's last distress assessment performed on [unfilled]. [Maximal Pain Intensity: 7/10] : 7/10 [Least Pain Intensity: 2/10] : 2/10 [Pain Duration: ___] : Pain duration: [unfilled] [NSAID/Non-Opioid] : NSAID/Non-Opioid [80: Normal activity with effort; some signs or symptoms of disease.] : 80: Normal activity with effort; some signs or symptoms of disease.  [ECOG Performance Status: 2 - Ambulatory and capable of all self care but unable to carry out any work activities] : Performance Status: 2 - Ambulatory and capable of all self care but unable to carry out any work activities. Up and about more than 50% of waking hours [Maximal Pain Intensity: 6/10] : 6/10 [Pain Interferes with ADLs] : Pain does not interfere with activities of daily living [ECOG Performance Status: 1 - Restricted in physically strenuous activity but ambulatory and able to carry out work of a light or sedentary nature] : Performance Status: 1 - Restricted in physically strenuous activity but ambulatory and able to carry out work of a light or sedentary nature, e.g., light house work, office work

## 2024-01-13 NOTE — DATA REVIEWED
[FreeTextEntry1] : pathology. labs Nov 1, 2023 imaging studies: CT chest Dec 5, 2023 (stable) medical record

## 2024-01-13 NOTE — ASSESSMENT
[No evidence of disease] : No evidence of disease [FreeTextEntry1] : status quo. Persistence of LE edema R>L.

## 2024-01-16 DIAGNOSIS — K43.0 INCISIONAL HERNIA WITH OBSTRUCTION, WITHOUT GANGRENE: ICD-10-CM

## 2024-01-16 DIAGNOSIS — F41.9 ANXIETY DISORDER, UNSPECIFIED: ICD-10-CM

## 2024-01-16 DIAGNOSIS — B20 HUMAN IMMUNODEFICIENCY VIRUS [HIV] DISEASE: ICD-10-CM

## 2024-01-16 DIAGNOSIS — Z87.891 PERSONAL HISTORY OF NICOTINE DEPENDENCE: ICD-10-CM

## 2024-01-16 DIAGNOSIS — J45.909 UNSPECIFIED ASTHMA, UNCOMPLICATED: ICD-10-CM

## 2024-01-19 ENCOUNTER — TRANSCRIPTION ENCOUNTER (OUTPATIENT)
Age: 49
End: 2024-01-19

## 2024-01-19 RX ORDER — TRAMADOL HYDROCHLORIDE 50 MG/1
50 TABLET, COATED ORAL
Qty: 100 | Refills: 0 | Status: ACTIVE | COMMUNITY
Start: 2017-10-31 | End: 1900-01-01

## 2024-01-22 ENCOUNTER — RX RENEWAL (OUTPATIENT)
Age: 49
End: 2024-01-22

## 2024-01-22 ENCOUNTER — TRANSCRIPTION ENCOUNTER (OUTPATIENT)
Age: 49
End: 2024-01-22

## 2024-01-22 ENCOUNTER — APPOINTMENT (OUTPATIENT)
Dept: SURGERY | Facility: CLINIC | Age: 49
End: 2024-01-22
Payer: MEDICARE

## 2024-01-22 VITALS
HEIGHT: 68 IN | HEART RATE: 74 BPM | WEIGHT: 215 LBS | DIASTOLIC BLOOD PRESSURE: 92 MMHG | TEMPERATURE: 97.8 F | BODY MASS INDEX: 32.58 KG/M2 | SYSTOLIC BLOOD PRESSURE: 162 MMHG

## 2024-01-22 PROCEDURE — 99212 OFFICE O/P EST SF 10 MIN: CPT

## 2024-01-29 ENCOUNTER — NON-APPOINTMENT (OUTPATIENT)
Age: 49
End: 2024-01-29

## 2024-02-05 ENCOUNTER — APPOINTMENT (OUTPATIENT)
Dept: SURGERY | Facility: CLINIC | Age: 49
End: 2024-02-05
Payer: MEDICARE

## 2024-02-05 VITALS
SYSTOLIC BLOOD PRESSURE: 113 MMHG | HEIGHT: 68 IN | DIASTOLIC BLOOD PRESSURE: 70 MMHG | BODY MASS INDEX: 32.43 KG/M2 | HEART RATE: 79 BPM | WEIGHT: 214 LBS

## 2024-02-05 PROCEDURE — 99211 OFF/OP EST MAY X REQ PHY/QHP: CPT

## 2024-02-05 NOTE — ASSESSMENT
[FreeTextEntry1] : patient is well, presents today after phone call friday. he was concerned about some redness around umbilicus. Denies any fevers or chills.   Incisions are well healed. minimal fluid collection around umbilicus, no cellulitis. No pain.    f/u 1 month

## 2024-02-08 ENCOUNTER — TRANSCRIPTION ENCOUNTER (OUTPATIENT)
Age: 49
End: 2024-02-08

## 2024-02-10 ENCOUNTER — LABORATORY RESULT (OUTPATIENT)
Age: 49
End: 2024-02-10

## 2024-02-10 LAB
25(OH)D3 SERPL-MCNC: 27.9 NG/ML
ALBUMIN SERPL ELPH-MCNC: 4.3 G/DL
ALP BLD-CCNC: 74 U/L
ALT SERPL-CCNC: 15 U/L
ANION GAP SERPL CALC-SCNC: 12 MMOL/L
AST SERPL-CCNC: 23 U/L
BILIRUB SERPL-MCNC: 2.4 MG/DL
BUN SERPL-MCNC: 14 MG/DL
CALCIUM SERPL-MCNC: 9.2 MG/DL
CHLORIDE SERPL-SCNC: 101 MMOL/L
CHOLEST SERPL-MCNC: 134 MG/DL
CO2 SERPL-SCNC: 24 MMOL/L
CREAT SERPL-MCNC: 0.97 MG/DL
EGFR: 95 ML/MIN/1.73M2
GLUCOSE SERPL-MCNC: 85 MG/DL
HDLC SERPL-MCNC: 15 MG/DL
LDLC SERPL CALC-MCNC: 88 MG/DL
NONHDLC SERPL-MCNC: 118 MG/DL
POTASSIUM SERPL-SCNC: 4.7 MMOL/L
PROT SERPL-MCNC: 8.2 G/DL
PSA SERPL-MCNC: 0.65 NG/ML
SODIUM SERPL-SCNC: 137 MMOL/L
TRIGL SERPL-MCNC: 169 MG/DL

## 2024-02-11 LAB
APPEARANCE: CLEAR
BACTERIA: NEGATIVE /HPF
BILIRUBIN URINE: NEGATIVE
BLOOD URINE: NEGATIVE
CAST: 0 /LPF
COLOR: YELLOW
EPITHELIAL CELLS: 0 /HPF
GLUCOSE QUALITATIVE U: NEGATIVE MG/DL
HCV AB SER QL: ABNORMAL
HCV S/CO RATIO: 1.96 S/CO
KETONES URINE: NEGATIVE MG/DL
LEUKOCYTE ESTERASE URINE: NEGATIVE
MICROSCOPIC-UA: NORMAL
NITRITE URINE: NEGATIVE
PH URINE: 5.5
PROTEIN URINE: NEGATIVE MG/DL
RED BLOOD CELLS URINE: 2 /HPF
SPECIFIC GRAVITY URINE: 1.02
UROBILINOGEN URINE: 1 MG/DL
WHITE BLOOD CELLS URINE: 0 /HPF

## 2024-02-12 LAB
C TRACH RRNA SPEC QL NAA+PROBE: NOT DETECTED
N GONORRHOEA RRNA SPEC QL NAA+PROBE: NOT DETECTED
SOURCE AMPLIFICATION: NORMAL
T PALLIDUM AB SER QL IA: POSITIVE

## 2024-02-15 LAB
M TB IFN-G BLD-IMP: NEGATIVE
QUANTIFERON TB PLUS MITOGEN MINUS NIL: 9.1 IU/ML
QUANTIFERON TB PLUS NIL: 0.02 IU/ML
QUANTIFERON TB PLUS TB1 MINUS NIL: 0 IU/ML
QUANTIFERON TB PLUS TB2 MINUS NIL: 0 IU/ML

## 2024-02-20 ENCOUNTER — TRANSCRIPTION ENCOUNTER (OUTPATIENT)
Age: 49
End: 2024-02-20

## 2024-02-20 ENCOUNTER — RX RENEWAL (OUTPATIENT)
Age: 49
End: 2024-02-20

## 2024-02-21 ENCOUNTER — TRANSCRIPTION ENCOUNTER (OUTPATIENT)
Age: 49
End: 2024-02-21

## 2024-02-21 ENCOUNTER — NON-APPOINTMENT (OUTPATIENT)
Age: 49
End: 2024-02-21

## 2024-02-22 ENCOUNTER — TRANSCRIPTION ENCOUNTER (OUTPATIENT)
Age: 49
End: 2024-02-22

## 2024-02-23 ENCOUNTER — TRANSCRIPTION ENCOUNTER (OUTPATIENT)
Age: 49
End: 2024-02-23

## 2024-02-25 ENCOUNTER — NON-APPOINTMENT (OUTPATIENT)
Age: 49
End: 2024-02-25

## 2024-03-14 ENCOUNTER — APPOINTMENT (OUTPATIENT)
Dept: INFECTIOUS DISEASE | Facility: CLINIC | Age: 49
End: 2024-03-14

## 2024-03-24 ENCOUNTER — RX RENEWAL (OUTPATIENT)
Age: 49
End: 2024-03-24

## 2024-03-25 ENCOUNTER — RX RENEWAL (OUTPATIENT)
Age: 49
End: 2024-03-25

## 2024-03-25 ENCOUNTER — RESULT REVIEW (OUTPATIENT)
Age: 49
End: 2024-03-25

## 2024-03-25 ENCOUNTER — APPOINTMENT (OUTPATIENT)
Dept: INFECTIOUS DISEASE | Facility: CLINIC | Age: 49
End: 2024-03-25
Payer: MEDICARE

## 2024-03-25 VITALS
OXYGEN SATURATION: 96 % | WEIGHT: 219 LBS | SYSTOLIC BLOOD PRESSURE: 122 MMHG | HEIGHT: 68 IN | BODY MASS INDEX: 33.19 KG/M2 | HEART RATE: 87 BPM | TEMPERATURE: 97.8 F | DIASTOLIC BLOOD PRESSURE: 74 MMHG

## 2024-03-25 DIAGNOSIS — B20 HUMAN IMMUNODEFICIENCY VIRUS [HIV] DISEASE: ICD-10-CM

## 2024-03-25 DIAGNOSIS — Z92.89 PERSONAL HISTORY OF OTHER MEDICAL TREATMENT: ICD-10-CM

## 2024-03-25 DIAGNOSIS — Z01.00 ENCOUNTER FOR EXAMINATION OF EYES AND VISION W/OUT ABNORMAL FINDINGS: ICD-10-CM

## 2024-03-25 PROCEDURE — 90677 PCV20 VACCINE IM: CPT

## 2024-03-25 PROCEDURE — 99214 OFFICE O/P EST MOD 30 MIN: CPT | Mod: 25

## 2024-03-25 PROCEDURE — G0009: CPT

## 2024-03-25 RX ORDER — FLUTICASONE FUROATE AND VILANTEROL TRIFENATATE 100; 25 UG/1; UG/1
100-25 POWDER RESPIRATORY (INHALATION)
Qty: 60 | Refills: 1 | Status: ACTIVE | COMMUNITY
Start: 2023-02-08 | End: 1900-01-01

## 2024-03-25 RX ORDER — AZITHROMYCIN 250 MG/1
250 TABLET, FILM COATED ORAL
Qty: 1 | Refills: 0 | Status: COMPLETED | COMMUNITY
Start: 2024-01-04 | End: 2024-03-25

## 2024-03-25 RX ORDER — GUAIFENESIN AND DEXTROMETHORPHAN HYDROBROMIDE 1200; 60 MG/1; MG/1
60-1200 TABLET, EXTENDED RELEASE ORAL
Qty: 20 | Refills: 0 | Status: COMPLETED | COMMUNITY
Start: 2023-03-28 | End: 2024-03-25

## 2024-03-25 NOTE — ASSESSMENT
[FreeTextEntry1] : HIV on Biktarvy doing well, continue current meds Gained 5 lbs Will work on healthy eating exercise and refer to dietician, if unable to loose weight consider switch to Odefsey.  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 It is the consensus amino acid in subtype A viruses belonging to the A6 clade. Spoke with dietician, he felt they were managing OK, so continued home cooking. Gave up baking. No exercise- S/P hernia repair with Mesh in January Labs from February 2024 reviewed GERD sx, no bulge, hernia on exam, check h pylori Ab, if + for stool test/refer to EGD, if negative give trial of PPI. Saw GI for dysphagia sx.- may need EGD , had negative iFOBT early this year. Seemed better in the summer. Will get colonoscopy when due. Was given "somthing to drink to clean himslelf out", ? PEG did not work. Feels he has a hernia- reducible- Will see Dr Aristides Moreno surgery- S/P lap ventral hrenia repair Jan 2024 at [place]- still recovering. H/O recurrent Staph skin infections, continue hibiclens and mupirocin, uses doxy with cellulitis Continue moisturizing legs/Epsom salts soaks for feet, using compression device Vaccines: due for 2nd Shingrix, Prevnar 20 Chronic hepatitis B, check AFP and hepatic US March 2023- hepatic steatosis, repeat now > 6 months. HCV, Fibrosure F1/F2, completed 8 weeks of Myvret.. Treated March 28- April 22, had SVR 12 on August 16, 2023 [place]- follows with Daniel Wilkerson, transferred to Dr Ziegler (Heme-onc) and Dr Davalos (Rad-onc)- had CT in March 2022, for repeat in March 2023- went for local Pedal edema was following with PMR, dermatology referral- is reluctant to use wraps Anal Pap: had HRA- saw Dr Harris Jan 2021, refer for F/U- did not like the experience- will refer to Dr Yoshi Whyte for HRA- will book with Isabell. ASCVD risk 5%- discussed Reprieve results- recommend rosuvastatin 10 mg daily- he will consider starting. Has very low LDL and HDL- not on medication, liver function appears normal (INR, Platlets- normal) Dental: Full dentures Oprtho: Dec 2023- reading glasses- OTC- rejuvinate eye drops

## 2024-03-25 NOTE — HISTORY OF PRESENT ILLNESS
[FreeTextEntry1] : HIV on Biktarvy Takes daily in the mornings Tolerating well No missed doses this week  Completed Mayet for HCV  Had severe diarrhea and gastroenteritis after attending a very crowded . used Pedialyte, bowels a little loose but improved. Did not affect his Biktarvy [Sexually Active] : The patient is not sexually active

## 2024-03-25 NOTE — REVIEW OF SYSTEMS
[Recent Weight Gain (___ Lbs)] : recent [unfilled] ~Ulb weight gain [Negative] : Heme/Lymph [___ # of Missed Doses in The Past Week] : [unfilled] doses missed in the past week  [FreeTextEntry7] : S/P gastroenteritis

## 2024-03-25 NOTE — PHYSICAL EXAM
[General Appearance - Alert] : alert [General Appearance - In No Acute Distress] : in no acute distress [Sclera] : the sclera and conjunctiva were normal [PERRL With Normal Accommodation] : pupils were equal in size, round, reactive to light [Extraocular Movements] : extraocular movements were intact [Outer Ear] : the ears and nose were normal in appearance [Oropharynx] : the oropharynx was normal with no thrush [Neck Appearance] : the appearance of the neck was normal [Neck Cervical Mass (___cm)] : no neck mass was observed [Jugular Venous Distention Increased] : there was no jugular-venous distention [Thyroid Diffuse Enlargement] : the thyroid was not enlarged [Auscultation Breath Sounds / Voice Sounds] : lungs were clear to auscultation bilaterally [Heart Rate And Rhythm] : heart rate was normal and rhythm regular [Heart Sounds] : normal S1 and S2 [Heart Sounds Gallop] : no gallops [Murmurs] : no murmurs [Heart Sounds Pericardial Friction Rub] : no pericardial rub [Full Pulse] : the pedal pulses are present [Bowel Sounds] : normal bowel sounds [Abdomen Soft] : soft [Abdomen Tenderness] : non-tender [Abdomen Mass (___ Cm)] : no abdominal mass palpated [Costovertebral Angle Tenderness] : no CVA tenderness [FreeTextEntry1] : Healed lap scars on left [No Palpable Adenopathy] : no palpable adenopathy [Musculoskeletal - Swelling] : no joint swelling [Nail Clubbing] : no clubbing  or cyanosis of the fingernails [Motor Tone] : muscle strength and tone were normal [Skin Color & Pigmentation] : normal skin color and pigmentation [] : no rash [Deep Tendon Reflexes (DTR)] : deep tendon reflexes were 2+ and symmetric [Sensation] : the sensory exam was normal to light touch and pinprick [Oriented To Time, Place, And Person] : oriented to person, place, and time [No Focal Deficits] : no focal deficits [Affect] : the affect was normal

## 2024-04-04 ENCOUNTER — APPOINTMENT (OUTPATIENT)
Dept: ULTRASOUND IMAGING | Facility: CLINIC | Age: 49
End: 2024-04-04
Payer: MEDICARE

## 2024-04-04 ENCOUNTER — OUTPATIENT (OUTPATIENT)
Dept: OUTPATIENT SERVICES | Facility: HOSPITAL | Age: 49
LOS: 1 days | End: 2024-04-04
Payer: MEDICARE

## 2024-04-04 DIAGNOSIS — B20 HUMAN IMMUNODEFICIENCY VIRUS [HIV] DISEASE: ICD-10-CM

## 2024-04-04 DIAGNOSIS — Z86.14 PERSONAL HISTORY OF METHICILLIN RESISTANT STAPHYLOCOCCUS AUREUS INFECTION: Chronic | ICD-10-CM

## 2024-04-04 DIAGNOSIS — Z86.19 PERSONAL HISTORY OF OTHER INFECTIOUS AND PARASITIC DISEASES: Chronic | ICD-10-CM

## 2024-04-04 DIAGNOSIS — Z90.89 ACQUIRED ABSENCE OF OTHER ORGANS: Chronic | ICD-10-CM

## 2024-04-04 DIAGNOSIS — Z98.890 OTHER SPECIFIED POSTPROCEDURAL STATES: Chronic | ICD-10-CM

## 2024-04-04 DIAGNOSIS — B19.10 UNSPECIFIED VIRAL HEPATITIS B WITHOUT HEPATIC COMA: ICD-10-CM

## 2024-04-04 PROCEDURE — 76705 ECHO EXAM OF ABDOMEN: CPT | Mod: 26

## 2024-04-04 PROCEDURE — 76705 ECHO EXAM OF ABDOMEN: CPT

## 2024-04-05 ENCOUNTER — NON-APPOINTMENT (OUTPATIENT)
Age: 49
End: 2024-04-05

## 2024-04-11 ENCOUNTER — OUTPATIENT (OUTPATIENT)
Dept: OUTPATIENT SERVICES | Facility: HOSPITAL | Age: 49
LOS: 1 days | End: 2024-04-11
Payer: MEDICARE

## 2024-04-11 ENCOUNTER — APPOINTMENT (OUTPATIENT)
Dept: CT IMAGING | Facility: CLINIC | Age: 49
End: 2024-04-11
Payer: MEDICARE

## 2024-04-11 DIAGNOSIS — Z90.89 ACQUIRED ABSENCE OF OTHER ORGANS: Chronic | ICD-10-CM

## 2024-04-11 DIAGNOSIS — Z86.14 PERSONAL HISTORY OF METHICILLIN RESISTANT STAPHYLOCOCCUS AUREUS INFECTION: Chronic | ICD-10-CM

## 2024-04-11 DIAGNOSIS — B19.10 UNSPECIFIED VIRAL HEPATITIS B WITHOUT HEPATIC COMA: ICD-10-CM

## 2024-04-11 DIAGNOSIS — Z86.19 PERSONAL HISTORY OF OTHER INFECTIOUS AND PARASITIC DISEASES: Chronic | ICD-10-CM

## 2024-04-11 DIAGNOSIS — Z98.890 OTHER SPECIFIED POSTPROCEDURAL STATES: Chronic | ICD-10-CM

## 2024-04-11 PROCEDURE — 74160 CT ABDOMEN W/CONTRAST: CPT

## 2024-04-11 PROCEDURE — 74160 CT ABDOMEN W/CONTRAST: CPT | Mod: 26,MH

## 2024-04-29 ENCOUNTER — RX RENEWAL (OUTPATIENT)
Age: 49
End: 2024-04-29

## 2024-04-29 RX ORDER — CLONAZEPAM 0.5 MG/1
0.5 TABLET ORAL
Qty: 60 | Refills: 0 | Status: ACTIVE | COMMUNITY
Start: 2018-04-18 | End: 1900-01-01

## 2024-04-29 RX ORDER — ALBUTEROL SULFATE 90 UG/1
108 (90 BASE) INHALANT RESPIRATORY (INHALATION)
Qty: 18 | Refills: 3 | Status: ACTIVE | COMMUNITY
Start: 2022-02-07

## 2024-04-29 RX ORDER — BICTEGRAVIR SODIUM, EMTRICITABINE, AND TENOFOVIR ALAFENAMIDE FUMARATE 50; 200; 25 MG/1; MG/1; MG/1
50-200-25 TABLET ORAL
Qty: 30 | Refills: 3 | Status: ACTIVE | COMMUNITY
Start: 2018-07-26 | End: 1900-01-01

## 2024-04-29 RX ORDER — SERTRALINE HYDROCHLORIDE 50 MG/1
50 TABLET, FILM COATED ORAL
Qty: 30 | Refills: 5 | Status: ACTIVE | COMMUNITY
Start: 2018-11-01 | End: 1900-01-01

## 2024-05-01 ENCOUNTER — APPOINTMENT (OUTPATIENT)
Dept: HEMATOLOGY ONCOLOGY | Facility: CLINIC | Age: 49
End: 2024-05-01

## 2024-05-02 ENCOUNTER — RX RENEWAL (OUTPATIENT)
Age: 49
End: 2024-05-02

## 2024-05-02 RX ORDER — FLUTICASONE FUROATE AND VILANTEROL TRIFENATATE 100; 25 UG/1; UG/1
100-25 POWDER RESPIRATORY (INHALATION)
Qty: 1 | Refills: 5 | Status: ACTIVE | COMMUNITY
Start: 2022-03-31 | End: 1900-01-01

## 2024-05-02 RX ORDER — CHLORHEXIDINE GLUCONATE 213 G/1000ML
4 SOLUTION TOPICAL
Qty: 118 | Refills: 0 | Status: ACTIVE | COMMUNITY
Start: 2024-04-29 | End: 1900-01-01

## 2024-06-04 NOTE — REASON FOR VISIT
Chief Complaint   Patient presents with     RECHECK     6 month follow up visit with GLENROY Ackerman LPN     Detail Level: Simple Detail Level: Zone [Follow-Up Visit] : a follow-up [Parent] : parent [FreeTextEntry2] : 43M HIV-associated Kaposi sarcoma, mostly affecting right lower extremity, causing damage to the soft tissue of the right lower extremity.

## 2024-06-24 ENCOUNTER — RX RENEWAL (OUTPATIENT)
Age: 49
End: 2024-06-24

## 2024-06-24 RX ORDER — TADALAFIL 20 MG/1
20 TABLET ORAL
Qty: 6 | Refills: 0 | Status: ACTIVE | COMMUNITY
Start: 2019-02-26 | End: 1900-01-01

## 2024-07-10 ENCOUNTER — RX RENEWAL (OUTPATIENT)
Age: 49
End: 2024-07-10

## 2024-07-11 ENCOUNTER — APPOINTMENT (OUTPATIENT)
Dept: RADIATION ONCOLOGY | Facility: CLINIC | Age: 49
End: 2024-07-11
Payer: MEDICARE

## 2024-07-11 VITALS
RESPIRATION RATE: 16 BRPM | DIASTOLIC BLOOD PRESSURE: 78 MMHG | WEIGHT: 215 LBS | BODY MASS INDEX: 32.58 KG/M2 | HEIGHT: 68 IN | OXYGEN SATURATION: 98 % | HEART RATE: 88 BPM | SYSTOLIC BLOOD PRESSURE: 122 MMHG

## 2024-07-11 DIAGNOSIS — I89.0 LYMPHEDEMA, NOT ELSEWHERE CLASSIFIED: ICD-10-CM

## 2024-07-11 DIAGNOSIS — C46.9 HUMAN IMMUNODEFICIENCY VIRUS [HIV] DISEASE: ICD-10-CM

## 2024-07-11 DIAGNOSIS — B20 HUMAN IMMUNODEFICIENCY VIRUS [HIV] DISEASE: ICD-10-CM

## 2024-07-11 PROCEDURE — 99213 OFFICE O/P EST LOW 20 MIN: CPT

## 2024-07-11 PROCEDURE — G2211 COMPLEX E/M VISIT ADD ON: CPT

## 2024-07-23 ENCOUNTER — RX RENEWAL (OUTPATIENT)
Age: 49
End: 2024-07-23

## 2024-07-23 ENCOUNTER — NON-APPOINTMENT (OUTPATIENT)
Age: 49
End: 2024-07-23

## 2024-08-21 ENCOUNTER — RX RENEWAL (OUTPATIENT)
Age: 49
End: 2024-08-21

## 2024-09-23 ENCOUNTER — RX RENEWAL (OUTPATIENT)
Age: 49
End: 2024-09-23

## 2024-10-07 ENCOUNTER — NON-APPOINTMENT (OUTPATIENT)
Age: 49
End: 2024-10-07

## 2024-10-07 ENCOUNTER — TRANSCRIPTION ENCOUNTER (OUTPATIENT)
Age: 49
End: 2024-10-07

## 2024-10-08 ENCOUNTER — TRANSCRIPTION ENCOUNTER (OUTPATIENT)
Age: 49
End: 2024-10-08

## 2024-10-09 ENCOUNTER — TRANSCRIPTION ENCOUNTER (OUTPATIENT)
Age: 49
End: 2024-10-09

## 2024-10-14 ENCOUNTER — NON-APPOINTMENT (OUTPATIENT)
Age: 49
End: 2024-10-14

## 2024-10-14 ENCOUNTER — APPOINTMENT (OUTPATIENT)
Dept: INFECTIOUS DISEASE | Facility: CLINIC | Age: 49
End: 2024-10-14
Payer: MEDICARE

## 2024-10-14 VITALS
DIASTOLIC BLOOD PRESSURE: 71 MMHG | HEART RATE: 72 BPM | HEIGHT: 68 IN | OXYGEN SATURATION: 97 % | WEIGHT: 225 LBS | TEMPERATURE: 97.7 F | SYSTOLIC BLOOD PRESSURE: 122 MMHG | BODY MASS INDEX: 34.1 KG/M2

## 2024-10-14 DIAGNOSIS — I89.0 LYMPHEDEMA, NOT ELSEWHERE CLASSIFIED: ICD-10-CM

## 2024-10-14 DIAGNOSIS — B20 HUMAN IMMUNODEFICIENCY VIRUS [HIV] DISEASE: ICD-10-CM

## 2024-10-14 DIAGNOSIS — Z20.2 CONTACT WITH AND (SUSPECTED) EXPOSURE TO INFECTIONS WITH A PREDOMINANTLY SEXUAL MODE OF TRANSMISSION: ICD-10-CM

## 2024-10-14 PROCEDURE — G2211 COMPLEX E/M VISIT ADD ON: CPT

## 2024-10-14 PROCEDURE — 99214 OFFICE O/P EST MOD 30 MIN: CPT

## 2024-10-14 RX ORDER — SEMAGLUTIDE 0.25 MG/.5ML
0.25 INJECTION, SOLUTION SUBCUTANEOUS
Qty: 1 | Refills: 1 | Status: COMPLETED | COMMUNITY
Start: 2024-10-07 | End: 2024-10-14

## 2024-10-14 RX ORDER — DOXYCYCLINE HYCLATE 100 MG/1
100 TABLET ORAL
Qty: 60 | Refills: 0 | Status: ACTIVE | COMMUNITY
Start: 2024-10-14 | End: 1900-01-01

## 2024-10-24 ENCOUNTER — RX RENEWAL (OUTPATIENT)
Age: 49
End: 2024-10-24

## 2024-11-18 ENCOUNTER — RX RENEWAL (OUTPATIENT)
Age: 49
End: 2024-11-18

## 2024-12-18 ENCOUNTER — RX RENEWAL (OUTPATIENT)
Age: 49
End: 2024-12-18

## 2024-12-27 ENCOUNTER — NON-APPOINTMENT (OUTPATIENT)
Age: 49
End: 2024-12-27

## 2024-12-30 ENCOUNTER — NON-APPOINTMENT (OUTPATIENT)
Age: 49
End: 2024-12-30

## 2025-01-07 ENCOUNTER — APPOINTMENT (OUTPATIENT)
Dept: PULMONOLOGY | Facility: CLINIC | Age: 50
End: 2025-01-07
Payer: MEDICARE

## 2025-01-07 VITALS
HEIGHT: 68 IN | SYSTOLIC BLOOD PRESSURE: 125 MMHG | DIASTOLIC BLOOD PRESSURE: 79 MMHG | HEART RATE: 76 BPM | BODY MASS INDEX: 34.86 KG/M2 | WEIGHT: 230 LBS | OXYGEN SATURATION: 96 %

## 2025-01-07 DIAGNOSIS — J44.89 OTHER SPECIFIED CHRONIC OBSTRUCTIVE PULMONARY DISEASE: ICD-10-CM

## 2025-01-07 PROCEDURE — 99215 OFFICE O/P EST HI 40 MIN: CPT

## 2025-01-08 DIAGNOSIS — E66.9 OBESITY, UNSPECIFIED: ICD-10-CM

## 2025-01-09 ENCOUNTER — NON-APPOINTMENT (OUTPATIENT)
Age: 50
End: 2025-01-09

## 2025-01-09 ENCOUNTER — APPOINTMENT (OUTPATIENT)
Dept: RADIATION ONCOLOGY | Facility: CLINIC | Age: 50
End: 2025-01-09
Payer: MEDICARE

## 2025-01-09 VITALS
OXYGEN SATURATION: 96 % | HEART RATE: 101 BPM | RESPIRATION RATE: 16 BRPM | DIASTOLIC BLOOD PRESSURE: 80 MMHG | WEIGHT: 234 LBS | BODY MASS INDEX: 35.46 KG/M2 | SYSTOLIC BLOOD PRESSURE: 150 MMHG | HEIGHT: 68 IN

## 2025-01-09 DIAGNOSIS — B20 HUMAN IMMUNODEFICIENCY VIRUS [HIV] DISEASE: ICD-10-CM

## 2025-01-09 DIAGNOSIS — R60.0 LOCALIZED EDEMA: ICD-10-CM

## 2025-01-09 DIAGNOSIS — C46.9 HUMAN IMMUNODEFICIENCY VIRUS [HIV] DISEASE: ICD-10-CM

## 2025-01-09 DIAGNOSIS — Q82.1: ICD-10-CM

## 2025-01-09 PROCEDURE — G2211 COMPLEX E/M VISIT ADD ON: CPT

## 2025-01-09 PROCEDURE — 99212 OFFICE O/P EST SF 10 MIN: CPT

## 2025-01-21 ENCOUNTER — APPOINTMENT (OUTPATIENT)
Dept: PULMONOLOGY | Facility: CLINIC | Age: 50
End: 2025-01-21
Payer: MEDICARE

## 2025-01-21 VITALS — HEIGHT: 66.5 IN | WEIGHT: 227 LBS | BODY MASS INDEX: 36.05 KG/M2

## 2025-01-21 DIAGNOSIS — J44.89 OTHER SPECIFIED CHRONIC OBSTRUCTIVE PULMONARY DISEASE: ICD-10-CM

## 2025-01-21 PROCEDURE — 94729 DIFFUSING CAPACITY: CPT

## 2025-01-21 PROCEDURE — 85018 HEMOGLOBIN: CPT | Mod: QW

## 2025-01-21 PROCEDURE — 94727 GAS DIL/WSHOT DETER LNG VOL: CPT

## 2025-01-21 PROCEDURE — 94010 BREATHING CAPACITY TEST: CPT

## 2025-01-23 ENCOUNTER — NON-APPOINTMENT (OUTPATIENT)
Age: 50
End: 2025-01-23

## 2025-01-23 VITALS — HEIGHT: 66.5 IN | BODY MASS INDEX: 36.05 KG/M2 | WEIGHT: 227 LBS

## 2025-01-23 DIAGNOSIS — Z87.891 PERSONAL HISTORY OF NICOTINE DEPENDENCE: ICD-10-CM

## 2025-01-27 ENCOUNTER — APPOINTMENT (OUTPATIENT)
Dept: BARIATRICS | Facility: CLINIC | Age: 50
End: 2025-01-27
Payer: MEDICARE

## 2025-01-27 ENCOUNTER — OUTPATIENT (OUTPATIENT)
Dept: OUTPATIENT SERVICES | Facility: HOSPITAL | Age: 50
LOS: 1 days | End: 2025-01-27
Payer: MEDICARE

## 2025-01-27 ENCOUNTER — TRANSCRIPTION ENCOUNTER (OUTPATIENT)
Age: 50
End: 2025-01-27

## 2025-01-27 ENCOUNTER — APPOINTMENT (OUTPATIENT)
Dept: CT IMAGING | Facility: CLINIC | Age: 50
End: 2025-01-27
Payer: MEDICARE

## 2025-01-27 VITALS
OXYGEN SATURATION: 97 % | SYSTOLIC BLOOD PRESSURE: 132 MMHG | TEMPERATURE: 97.7 F | BODY MASS INDEX: 36.88 KG/M2 | DIASTOLIC BLOOD PRESSURE: 78 MMHG | HEIGHT: 67 IN | HEART RATE: 93 BPM | WEIGHT: 235 LBS

## 2025-01-27 DIAGNOSIS — Z98.890 OTHER SPECIFIED POSTPROCEDURAL STATES: Chronic | ICD-10-CM

## 2025-01-27 DIAGNOSIS — E56.9 VITAMIN DEFICIENCY, UNSPECIFIED: ICD-10-CM

## 2025-01-27 DIAGNOSIS — Z86.39 PERSONAL HISTORY OF OTHER ENDOCRINE, NUTRITIONAL AND METABOLIC DISEASE: ICD-10-CM

## 2025-01-27 DIAGNOSIS — Z86.19 PERSONAL HISTORY OF OTHER INFECTIOUS AND PARASITIC DISEASES: Chronic | ICD-10-CM

## 2025-01-27 DIAGNOSIS — Z86.14 PERSONAL HISTORY OF METHICILLIN RESISTANT STAPHYLOCOCCUS AUREUS INFECTION: Chronic | ICD-10-CM

## 2025-01-27 DIAGNOSIS — F32.A ANXIETY DISORDER, UNSPECIFIED: ICD-10-CM

## 2025-01-27 DIAGNOSIS — J44.9 CHRONIC OBSTRUCTIVE PULMONARY DISEASE, UNSPECIFIED: ICD-10-CM

## 2025-01-27 DIAGNOSIS — Z90.89 ACQUIRED ABSENCE OF OTHER ORGANS: Chronic | ICD-10-CM

## 2025-01-27 DIAGNOSIS — J44.89 OTHER SPECIFIED CHRONIC OBSTRUCTIVE PULMONARY DISEASE: ICD-10-CM

## 2025-01-27 DIAGNOSIS — R63.5 ABNORMAL WEIGHT GAIN: ICD-10-CM

## 2025-01-27 DIAGNOSIS — G47.10 HYPERSOMNIA, UNSPECIFIED: ICD-10-CM

## 2025-01-27 DIAGNOSIS — F41.9 ANXIETY DISORDER, UNSPECIFIED: ICD-10-CM

## 2025-01-27 PROCEDURE — 71271 CT THORAX LUNG CANCER SCR C-: CPT | Mod: 26

## 2025-01-27 PROCEDURE — 99215 OFFICE O/P EST HI 40 MIN: CPT

## 2025-01-27 PROCEDURE — 71271 CT THORAX LUNG CANCER SCR C-: CPT

## 2025-01-27 RX ORDER — FLUTICASONE FUROATE, UMECLIDINIUM BROMIDE AND VILANTEROL TRIFENATATE 100; 62.5; 25 UG/1; UG/1; UG/1
100-62.5-25 POWDER RESPIRATORY (INHALATION)
Qty: 1 | Refills: 3 | Status: ACTIVE | COMMUNITY
Start: 2025-01-27 | End: 1900-01-01

## 2025-01-28 ENCOUNTER — TRANSCRIPTION ENCOUNTER (OUTPATIENT)
Age: 50
End: 2025-01-28

## 2025-01-28 LAB
25(OH)D3 SERPL-MCNC: 22.1 NG/ML
ALBUMIN SERPL ELPH-MCNC: 3.9 G/DL
ALP BLD-CCNC: 71 U/L
ALT SERPL-CCNC: 26 U/L
ANION GAP SERPL CALC-SCNC: 10 MMOL/L
AST SERPL-CCNC: 20 U/L
BILIRUB SERPL-MCNC: 1.5 MG/DL
BUN SERPL-MCNC: 15 MG/DL
CALCIUM SERPL-MCNC: 9.1 MG/DL
CALCIUM SERPL-MCNC: 9.1 MG/DL
CHLORIDE SERPL-SCNC: 101 MMOL/L
CHOLEST SERPL-MCNC: 153 MG/DL
CO2 SERPL-SCNC: 26 MMOL/L
CREAT SERPL-MCNC: 1.02 MG/DL
EGFR: 89 ML/MIN/1.73M2
ESTIMATED AVERAGE GLUCOSE: 94 MG/DL
FOLATE SERPL-MCNC: 7.2 NG/ML
GLUCOSE SERPL-MCNC: 99 MG/DL
HBA1C MFR BLD HPLC: 4.9 %
HCT VFR BLD CALC: 44.1 %
HDLC SERPL-MCNC: 32 MG/DL
HGB BLD-MCNC: 15 G/DL
IRON SATN MFR SERPL: 25 %
IRON SERPL-MCNC: 72 UG/DL
LDLC SERPL CALC-MCNC: 94 MG/DL
MCHC RBC-ENTMCNC: 30.1 PG
MCHC RBC-ENTMCNC: 34 G/DL
MCV RBC AUTO: 88.6 FL
NONHDLC SERPL-MCNC: 121 MG/DL
PARATHYROID HORMONE INTACT: 40 PG/ML
PLATELET # BLD AUTO: 214 K/UL
POTASSIUM SERPL-SCNC: 4 MMOL/L
PROT SERPL-MCNC: 8 G/DL
RBC # BLD: 4.98 M/UL
RBC # FLD: 15.4 %
SODIUM SERPL-SCNC: 137 MMOL/L
TIBC SERPL-MCNC: 289 UG/DL
TRIGL SERPL-MCNC: 149 MG/DL
TSH SERPL-ACNC: 2.14 UIU/ML
UIBC SERPL-MCNC: 218 UG/DL
VIT B12 SERPL-MCNC: 407 PG/ML
WBC # FLD AUTO: 5.62 K/UL

## 2025-01-31 ENCOUNTER — TRANSCRIPTION ENCOUNTER (OUTPATIENT)
Age: 50
End: 2025-01-31

## 2025-02-01 LAB — VIT B1 SERPL-MCNC: 155.6 NMOL/L

## 2025-02-04 ENCOUNTER — OUTPATIENT (OUTPATIENT)
Dept: OUTPATIENT SERVICES | Facility: HOSPITAL | Age: 50
LOS: 1 days | End: 2025-02-04
Payer: MEDICARE

## 2025-02-04 DIAGNOSIS — Z98.890 OTHER SPECIFIED POSTPROCEDURAL STATES: Chronic | ICD-10-CM

## 2025-02-04 DIAGNOSIS — Z90.89 ACQUIRED ABSENCE OF OTHER ORGANS: Chronic | ICD-10-CM

## 2025-02-04 DIAGNOSIS — G47.33 OBSTRUCTIVE SLEEP APNEA (ADULT) (PEDIATRIC): ICD-10-CM

## 2025-02-04 DIAGNOSIS — Z86.14 PERSONAL HISTORY OF METHICILLIN RESISTANT STAPHYLOCOCCUS AUREUS INFECTION: Chronic | ICD-10-CM

## 2025-02-04 DIAGNOSIS — Z86.19 PERSONAL HISTORY OF OTHER INFECTIOUS AND PARASITIC DISEASES: Chronic | ICD-10-CM

## 2025-02-04 PROCEDURE — G0400: CPT | Mod: 26

## 2025-02-04 PROCEDURE — 95800 SLP STDY UNATTENDED: CPT

## 2025-02-06 ENCOUNTER — TRANSCRIPTION ENCOUNTER (OUTPATIENT)
Age: 50
End: 2025-02-06

## 2025-02-11 ENCOUNTER — OUTPATIENT (OUTPATIENT)
Dept: OUTPATIENT SERVICES | Facility: HOSPITAL | Age: 50
LOS: 1 days | End: 2025-02-11
Payer: MEDICARE

## 2025-02-11 ENCOUNTER — APPOINTMENT (OUTPATIENT)
Age: 50
End: 2025-02-11
Payer: MEDICAID

## 2025-02-11 DIAGNOSIS — Z86.19 PERSONAL HISTORY OF OTHER INFECTIOUS AND PARASITIC DISEASES: Chronic | ICD-10-CM

## 2025-02-11 DIAGNOSIS — Z86.14 PERSONAL HISTORY OF METHICILLIN RESISTANT STAPHYLOCOCCUS AUREUS INFECTION: Chronic | ICD-10-CM

## 2025-02-11 PROCEDURE — ZZZZZ: CPT

## 2025-02-11 PROCEDURE — D0330: CPT

## 2025-02-11 PROCEDURE — D0150: CPT

## 2025-02-21 ENCOUNTER — APPOINTMENT (OUTPATIENT)
Dept: PULMONOLOGY | Facility: CLINIC | Age: 50
End: 2025-02-21

## 2025-02-24 ENCOUNTER — OUTPATIENT (OUTPATIENT)
Dept: OUTPATIENT SERVICES | Facility: HOSPITAL | Age: 50
LOS: 1 days | End: 2025-02-24

## 2025-02-24 ENCOUNTER — APPOINTMENT (OUTPATIENT)
Age: 50
End: 2025-02-24
Payer: MEDICAID

## 2025-02-24 DIAGNOSIS — Z98.890 OTHER SPECIFIED POSTPROCEDURAL STATES: Chronic | ICD-10-CM

## 2025-02-24 PROCEDURE — ZZZZZ: CPT

## 2025-02-26 DIAGNOSIS — E55.9 VITAMIN D DEFICIENCY, UNSPECIFIED: ICD-10-CM

## 2025-02-27 ENCOUNTER — APPOINTMENT (OUTPATIENT)
Dept: BARIATRICS | Facility: CLINIC | Age: 50
End: 2025-02-27
Payer: MEDICARE

## 2025-02-27 VITALS — BODY MASS INDEX: 36.1 KG/M2 | HEIGHT: 67 IN | WEIGHT: 230 LBS

## 2025-02-27 PROCEDURE — 99214 OFFICE O/P EST MOD 30 MIN: CPT | Mod: 2W

## 2025-02-28 ENCOUNTER — NON-APPOINTMENT (OUTPATIENT)
Age: 50
End: 2025-02-28

## 2025-02-28 RX ORDER — TIRZEPATIDE 2.5 MG/.5ML
2.5 INJECTION, SOLUTION SUBCUTANEOUS
Qty: 1 | Refills: 0 | Status: ACTIVE | COMMUNITY
Start: 2025-02-28 | End: 1900-01-01

## 2025-02-28 RX ORDER — COLD-HOT PACK
125 MCG EACH MISCELLANEOUS
Qty: 30 | Refills: 1 | Status: ACTIVE | COMMUNITY
Start: 2025-02-26 | End: 1900-01-01

## 2025-03-12 ENCOUNTER — NON-APPOINTMENT (OUTPATIENT)
Age: 50
End: 2025-03-12

## 2025-03-12 ENCOUNTER — RX RENEWAL (OUTPATIENT)
Age: 50
End: 2025-03-12

## 2025-03-17 ENCOUNTER — APPOINTMENT (OUTPATIENT)
Age: 50
End: 2025-03-17

## 2025-03-17 ENCOUNTER — APPOINTMENT (OUTPATIENT)
Dept: INFECTIOUS DISEASE | Facility: CLINIC | Age: 50
End: 2025-03-17

## 2025-03-25 ENCOUNTER — APPOINTMENT (OUTPATIENT)
Age: 50
End: 2025-03-25
Payer: MEDICAID

## 2025-03-26 ENCOUNTER — OUTPATIENT (OUTPATIENT)
Dept: OUTPATIENT SERVICES | Facility: HOSPITAL | Age: 50
LOS: 1 days | End: 2025-03-26

## 2025-03-26 DIAGNOSIS — Z86.14 PERSONAL HISTORY OF METHICILLIN RESISTANT STAPHYLOCOCCUS AUREUS INFECTION: Chronic | ICD-10-CM

## 2025-03-26 DIAGNOSIS — Z98.890 OTHER SPECIFIED POSTPROCEDURAL STATES: Chronic | ICD-10-CM

## 2025-03-26 DIAGNOSIS — Z86.19 PERSONAL HISTORY OF OTHER INFECTIOUS AND PARASITIC DISEASES: Chronic | ICD-10-CM

## 2025-03-26 DIAGNOSIS — Z90.89 ACQUIRED ABSENCE OF OTHER ORGANS: Chronic | ICD-10-CM

## 2025-03-26 PROCEDURE — ZZZZZ: CPT

## 2025-04-05 ENCOUNTER — RX RENEWAL (OUTPATIENT)
Age: 50
End: 2025-04-05

## 2025-04-14 ENCOUNTER — APPOINTMENT (OUTPATIENT)
Age: 50
End: 2025-04-14

## 2025-04-28 ENCOUNTER — APPOINTMENT (OUTPATIENT)
Age: 50
End: 2025-04-28

## 2025-04-28 ENCOUNTER — APPOINTMENT (OUTPATIENT)
Dept: INFECTIOUS DISEASE | Facility: CLINIC | Age: 50
End: 2025-04-28
Payer: MEDICARE

## 2025-04-28 ENCOUNTER — OUTPATIENT (OUTPATIENT)
Dept: OUTPATIENT SERVICES | Facility: HOSPITAL | Age: 50
LOS: 1 days | End: 2025-04-28

## 2025-04-28 ENCOUNTER — RX RENEWAL (OUTPATIENT)
Age: 50
End: 2025-04-28

## 2025-04-28 VITALS
SYSTOLIC BLOOD PRESSURE: 144 MMHG | WEIGHT: 224 LBS | DIASTOLIC BLOOD PRESSURE: 79 MMHG | BODY MASS INDEX: 35.16 KG/M2 | HEART RATE: 79 BPM | HEIGHT: 67 IN | OXYGEN SATURATION: 96 % | TEMPERATURE: 98 F

## 2025-04-28 DIAGNOSIS — B20 HUMAN IMMUNODEFICIENCY VIRUS [HIV] DISEASE: ICD-10-CM

## 2025-04-28 DIAGNOSIS — Z86.19 PERSONAL HISTORY OF OTHER INFECTIOUS AND PARASITIC DISEASES: Chronic | ICD-10-CM

## 2025-04-28 DIAGNOSIS — Z90.89 ACQUIRED ABSENCE OF OTHER ORGANS: Chronic | ICD-10-CM

## 2025-04-28 DIAGNOSIS — Z98.890 OTHER SPECIFIED POSTPROCEDURAL STATES: Chronic | ICD-10-CM

## 2025-04-28 DIAGNOSIS — Z12.5 ENCOUNTER FOR SCREENING FOR MALIGNANT NEOPLASM OF PROSTATE: ICD-10-CM

## 2025-04-28 PROCEDURE — ZZZZZ: CPT

## 2025-04-28 PROCEDURE — 99214 OFFICE O/P EST MOD 30 MIN: CPT

## 2025-04-29 ENCOUNTER — TRANSCRIPTION ENCOUNTER (OUTPATIENT)
Age: 50
End: 2025-04-29

## 2025-04-30 DIAGNOSIS — K08.109 COMPLETE LOSS OF TEETH, UNSPECIFIED CAUSE, UNSPECIFIED CLASS: ICD-10-CM

## 2025-04-30 DIAGNOSIS — Z01.20 ENCOUNTER FOR DENTAL EXAMINATION AND CLEANING WITHOUT ABNORMAL FINDINGS: ICD-10-CM

## 2025-05-05 ENCOUNTER — RX RENEWAL (OUTPATIENT)
Age: 50
End: 2025-05-05

## 2025-05-06 ENCOUNTER — NON-APPOINTMENT (OUTPATIENT)
Age: 50
End: 2025-05-06

## 2025-05-19 ENCOUNTER — APPOINTMENT (OUTPATIENT)
Age: 50
End: 2025-05-19

## 2025-05-27 ENCOUNTER — RX RENEWAL (OUTPATIENT)
Age: 50
End: 2025-05-27

## 2025-06-17 ENCOUNTER — APPOINTMENT (OUTPATIENT)
Age: 50
End: 2025-06-17
Payer: MEDICAID

## 2025-06-17 ENCOUNTER — OUTPATIENT (OUTPATIENT)
Dept: OUTPATIENT SERVICES | Facility: HOSPITAL | Age: 50
LOS: 1 days | End: 2025-06-17

## 2025-06-17 DIAGNOSIS — Z86.14 PERSONAL HISTORY OF METHICILLIN RESISTANT STAPHYLOCOCCUS AUREUS INFECTION: Chronic | ICD-10-CM

## 2025-06-17 DIAGNOSIS — Z90.89 ACQUIRED ABSENCE OF OTHER ORGANS: Chronic | ICD-10-CM

## 2025-06-17 PROCEDURE — ZZZZZ: CPT

## 2025-06-19 ENCOUNTER — APPOINTMENT (OUTPATIENT)
Dept: RADIATION ONCOLOGY | Facility: CLINIC | Age: 50
End: 2025-06-19
Payer: MEDICARE

## 2025-06-19 VITALS
RESPIRATION RATE: 16 BRPM | HEIGHT: 67 IN | BODY MASS INDEX: 35.16 KG/M2 | DIASTOLIC BLOOD PRESSURE: 81 MMHG | WEIGHT: 224 LBS | SYSTOLIC BLOOD PRESSURE: 158 MMHG | HEART RATE: 90 BPM | OXYGEN SATURATION: 96 %

## 2025-06-19 PROCEDURE — 99213 OFFICE O/P EST LOW 20 MIN: CPT

## 2025-06-25 ENCOUNTER — RX RENEWAL (OUTPATIENT)
Age: 50
End: 2025-06-25

## 2025-07-02 DIAGNOSIS — K08.109 COMPLETE LOSS OF TEETH, UNSPECIFIED CAUSE, UNSPECIFIED CLASS: ICD-10-CM

## 2025-07-15 ENCOUNTER — APPOINTMENT (OUTPATIENT)
Age: 50
End: 2025-07-15
Payer: MEDICAID

## 2025-07-15 PROCEDURE — ZZZZZ: CPT

## 2025-07-23 ENCOUNTER — RX RENEWAL (OUTPATIENT)
Age: 50
End: 2025-07-23

## 2025-08-12 ENCOUNTER — TRANSCRIPTION ENCOUNTER (OUTPATIENT)
Age: 50
End: 2025-08-12

## 2025-08-22 ENCOUNTER — TRANSCRIPTION ENCOUNTER (OUTPATIENT)
Age: 50
End: 2025-08-22

## 2025-08-25 ENCOUNTER — TRANSCRIPTION ENCOUNTER (OUTPATIENT)
Age: 50
End: 2025-08-25

## 2025-08-25 ENCOUNTER — RX RENEWAL (OUTPATIENT)
Age: 50
End: 2025-08-25

## 2025-08-26 ENCOUNTER — TRANSCRIPTION ENCOUNTER (OUTPATIENT)
Age: 50
End: 2025-08-26

## 2025-09-05 ENCOUNTER — NON-APPOINTMENT (OUTPATIENT)
Age: 50
End: 2025-09-05

## 2025-09-05 ENCOUNTER — TRANSCRIPTION ENCOUNTER (OUTPATIENT)
Age: 50
End: 2025-09-05

## (undated) DEVICE — SUT VLOC 180 0 18" GS-21 GREEN

## (undated) DEVICE — PACK GENERAL LAPAROSCOPY

## (undated) DEVICE — XI DRAPE COLUMN

## (undated) DEVICE — SUT VICRYL 3-0 27" SH UNDYED

## (undated) DEVICE — POSITIONER PURPLE ARM ONE STEP (LARGE)

## (undated) DEVICE — DRAPE SPLIT SHEET 77" X 120"

## (undated) DEVICE — XI TIP COVER

## (undated) DEVICE — XI ARM NEEDLE DRIVER MEGA

## (undated) DEVICE — POSITIONER PINK PAD PIGAZZI SYSTEM

## (undated) DEVICE — XI ARM FORCEP FENESTRATED BIPOLAR 8MM

## (undated) DEVICE — NDL HYPO SAFE 22G X 1.5" (BLACK)

## (undated) DEVICE — GLV 7.5 PROTEXIS (WHITE)

## (undated) DEVICE — XI ARM SCISSOR MONO CURVED

## (undated) DEVICE — XI DRAPE ARM

## (undated) DEVICE — XI OBTURATOR OPTICAL BLADELESS 8MM

## (undated) DEVICE — XI SEAL UNIV 5- 8 MM

## (undated) DEVICE — DRAPE 3/4 SHEET 52X76"

## (undated) DEVICE — D HELP - CLEARVIEW CLEARIFY SYSTEM

## (undated) DEVICE — SUT MONOCRYL 4-0 27" PS-2 UNDYED

## (undated) DEVICE — SUT VLOC 180 2-0 12" V-20 GREEN